# Patient Record
Sex: MALE | Race: WHITE | NOT HISPANIC OR LATINO | URBAN - METROPOLITAN AREA
[De-identification: names, ages, dates, MRNs, and addresses within clinical notes are randomized per-mention and may not be internally consistent; named-entity substitution may affect disease eponyms.]

---

## 2017-01-12 ENCOUNTER — EMERGENCY (EMERGENCY)
Facility: HOSPITAL | Age: 52
LOS: 0 days | Discharge: HOME | End: 2017-01-12
Admitting: EMERGENCY MEDICINE
Payer: COMMERCIAL

## 2017-01-12 DIAGNOSIS — B34.9 VIRAL INFECTION, UNSPECIFIED: ICD-10-CM

## 2017-01-12 DIAGNOSIS — R50.9 FEVER, UNSPECIFIED: ICD-10-CM

## 2017-01-12 DIAGNOSIS — Z11.2 ENCOUNTER FOR SCREENING FOR OTHER BACTERIAL DISEASES: ICD-10-CM

## 2017-01-12 PROCEDURE — 99284 EMERGENCY DEPT VISIT MOD MDM: CPT | Mod: 25

## 2017-01-12 PROCEDURE — 93010 ELECTROCARDIOGRAM REPORT: CPT

## 2017-04-15 ENCOUNTER — EMERGENCY (EMERGENCY)
Facility: HOSPITAL | Age: 52
LOS: 0 days | Discharge: HOME | End: 2017-04-16
Attending: EMERGENCY MEDICINE | Admitting: EMERGENCY MEDICINE
Payer: COMMERCIAL

## 2017-04-15 VITALS — HEIGHT: 68 IN | WEIGHT: 220.02 LBS

## 2017-04-15 DIAGNOSIS — B34.9 VIRAL INFECTION, UNSPECIFIED: ICD-10-CM

## 2017-04-15 DIAGNOSIS — R07.89 OTHER CHEST PAIN: ICD-10-CM

## 2017-04-15 DIAGNOSIS — R05 COUGH: ICD-10-CM

## 2017-04-15 LAB
ADD ON TEST-SPECIMEN IN LAB: SIGNIFICANT CHANGE UP
ALBUMIN SERPL ELPH-MCNC: 4 G/DL — SIGNIFICANT CHANGE UP (ref 3.3–5)
ALP SERPL-CCNC: 44 U/L — SIGNIFICANT CHANGE UP (ref 40–120)
ALT FLD-CCNC: 39 U/L — SIGNIFICANT CHANGE UP (ref 12–78)
ANION GAP SERPL CALC-SCNC: 10 MMOL/L — SIGNIFICANT CHANGE UP (ref 5–17)
AST SERPL-CCNC: 17 U/L — SIGNIFICANT CHANGE UP (ref 15–37)
BASOPHILS # BLD AUTO: 0.1 K/UL — SIGNIFICANT CHANGE UP (ref 0–0.2)
BASOPHILS NFR BLD AUTO: 0.9 % — SIGNIFICANT CHANGE UP (ref 0–2)
BILIRUB SERPL-MCNC: 0.8 MG/DL — SIGNIFICANT CHANGE UP (ref 0.2–1.2)
BUN SERPL-MCNC: 16 MG/DL — SIGNIFICANT CHANGE UP (ref 7–23)
CALCIUM SERPL-MCNC: 9.2 MG/DL — SIGNIFICANT CHANGE UP (ref 8.5–10.1)
CHLORIDE SERPL-SCNC: 106 MMOL/L — SIGNIFICANT CHANGE UP (ref 96–108)
CO2 SERPL-SCNC: 26 MMOL/L — SIGNIFICANT CHANGE UP (ref 22–31)
CREAT SERPL-MCNC: 1.07 MG/DL — SIGNIFICANT CHANGE UP (ref 0.5–1.3)
EOSINOPHIL # BLD AUTO: 0 K/UL — SIGNIFICANT CHANGE UP (ref 0–0.5)
EOSINOPHIL NFR BLD AUTO: 0.5 % — SIGNIFICANT CHANGE UP (ref 0–6)
GLUCOSE SERPL-MCNC: 255 MG/DL — HIGH (ref 70–99)
HCT VFR BLD CALC: 46.5 % — SIGNIFICANT CHANGE UP (ref 39–50)
HGB BLD-MCNC: 16.1 G/DL — SIGNIFICANT CHANGE UP (ref 13–17)
LACTATE SERPL-SCNC: 2.2 MMOL/L — HIGH (ref 0.7–2)
LYMPHOCYTES # BLD AUTO: 1.8 K/UL — SIGNIFICANT CHANGE UP (ref 1–3.3)
LYMPHOCYTES # BLD AUTO: 26.4 % — SIGNIFICANT CHANGE UP (ref 13–44)
MCHC RBC-ENTMCNC: 30.9 PG — SIGNIFICANT CHANGE UP (ref 27–34)
MCHC RBC-ENTMCNC: 34.7 GM/DL — SIGNIFICANT CHANGE UP (ref 32–36)
MCV RBC AUTO: 89.1 FL — SIGNIFICANT CHANGE UP (ref 80–100)
MONOCYTES # BLD AUTO: 0.5 K/UL — SIGNIFICANT CHANGE UP (ref 0–0.9)
MONOCYTES NFR BLD AUTO: 7.6 % — SIGNIFICANT CHANGE UP (ref 2–14)
NEUTROPHILS # BLD AUTO: 4.5 K/UL — SIGNIFICANT CHANGE UP (ref 1.8–7.4)
NEUTROPHILS NFR BLD AUTO: 64.6 % — SIGNIFICANT CHANGE UP (ref 43–77)
PLATELET # BLD AUTO: 244 K/UL — SIGNIFICANT CHANGE UP (ref 150–400)
POTASSIUM SERPL-MCNC: 3.6 MMOL/L — SIGNIFICANT CHANGE UP (ref 3.5–5.3)
POTASSIUM SERPL-SCNC: 3.6 MMOL/L — SIGNIFICANT CHANGE UP (ref 3.5–5.3)
PROT SERPL-MCNC: 7.4 GM/DL — SIGNIFICANT CHANGE UP (ref 6–8.3)
RBC # BLD: 5.22 M/UL — SIGNIFICANT CHANGE UP (ref 4.2–5.8)
RBC # FLD: 12 % — SIGNIFICANT CHANGE UP (ref 10.3–14.5)
SODIUM SERPL-SCNC: 142 MMOL/L — SIGNIFICANT CHANGE UP (ref 135–145)
WBC # BLD: 6.9 K/UL — SIGNIFICANT CHANGE UP (ref 3.8–10.5)
WBC # FLD AUTO: 6.9 K/UL — SIGNIFICANT CHANGE UP (ref 3.8–10.5)

## 2017-04-15 PROCEDURE — 93010 ELECTROCARDIOGRAM REPORT: CPT

## 2017-04-15 PROCEDURE — 99284 EMERGENCY DEPT VISIT MOD MDM: CPT

## 2017-04-15 PROCEDURE — 71020: CPT | Mod: 26

## 2017-04-15 RX ORDER — SODIUM CHLORIDE 9 MG/ML
1000 INJECTION INTRAMUSCULAR; INTRAVENOUS; SUBCUTANEOUS ONCE
Qty: 0 | Refills: 0 | Status: COMPLETED | OUTPATIENT
Start: 2017-04-15 | End: 2017-04-15

## 2017-04-15 RX ORDER — ALBUTEROL 90 UG/1
2 AEROSOL, METERED ORAL ONCE
Qty: 0 | Refills: 0 | Status: COMPLETED | OUTPATIENT
Start: 2017-04-15 | End: 2017-04-15

## 2017-04-15 RX ORDER — FLUTICASONE PROPIONATE 50 MCG
2 SPRAY, SUSPENSION NASAL
Qty: 1 | Refills: 0 | OUTPATIENT
Start: 2017-04-15 | End: 2017-05-15

## 2017-04-15 RX ORDER — IPRATROPIUM/ALBUTEROL SULFATE 18-103MCG
3 AEROSOL WITH ADAPTER (GRAM) INHALATION ONCE
Qty: 0 | Refills: 0 | Status: COMPLETED | OUTPATIENT
Start: 2017-04-15 | End: 2017-04-15

## 2017-04-15 RX ORDER — BENZOCAINE AND MENTHOL 5; 1 G/100ML; G/100ML
1 LIQUID ORAL ONCE
Qty: 0 | Refills: 0 | Status: COMPLETED | OUTPATIENT
Start: 2017-04-15 | End: 2017-04-15

## 2017-04-15 RX ORDER — KETOROLAC TROMETHAMINE 30 MG/ML
15 SYRINGE (ML) INJECTION ONCE
Qty: 0 | Refills: 0 | Status: DISCONTINUED | OUTPATIENT
Start: 2017-04-15 | End: 2017-04-15

## 2017-04-15 RX ADMIN — SODIUM CHLORIDE 1000 MILLILITER(S): 9 INJECTION INTRAMUSCULAR; INTRAVENOUS; SUBCUTANEOUS at 22:00

## 2017-04-15 RX ADMIN — BENZOCAINE AND MENTHOL 1 LOZENGE: 5; 1 LIQUID ORAL at 22:00

## 2017-04-15 RX ADMIN — SODIUM CHLORIDE 1000 MILLILITER(S): 9 INJECTION INTRAMUSCULAR; INTRAVENOUS; SUBCUTANEOUS at 23:23

## 2017-04-15 RX ADMIN — ALBUTEROL 2 PUFF(S): 90 AEROSOL, METERED ORAL at 23:23

## 2017-04-15 RX ADMIN — Medication 3 MILLILITER(S): at 23:23

## 2017-04-15 RX ADMIN — Medication 15 MILLIGRAM(S): at 22:00

## 2017-04-15 NOTE — ED STATDOCS - DETAILS:
I, Amanda Gill, performed the initial face to face bedside interview with this patient regarding history of present illness, review of symptoms and relevant past medical, social and family history.  I completed an independent physical examination.  I was the initial provider who evaluated this patient.  The history, relevant review of systems, past medical and surgical history, medical decision making, and physical examination was documented by the scribe in my presence and I attest to the accuracy of the documentation.

## 2017-04-15 NOTE — ED ADULT NURSE NOTE - OBJECTIVE STATEMENT
pt presents to ed for eval cough x 2 days . pt presents to ed awake and alert skin color pink warm and dry resp easy and unlabored no s/s of acute distress.

## 2017-04-15 NOTE — ED STATDOCS - PROGRESS NOTE DETAILS
Pt states he is feeling a better. I discussed results, including hyperglycemia - concern for new dx diabetes.  Pt noted frequent post nasal drip - sending fluticasone to his pharmacy, in addition to albuterol which he thinks helped open up his chest/ loosen sputum.  Completing 2nd liter of fluid, awaiting hb a1c, and then well for dischage. I alerted patient that a1c still not back. He will be discharged and will call for result tomorrow.

## 2017-04-15 NOTE — ED STATDOCS - OBJECTIVE STATEMENT
51 y/o M with NO Hx of asthma, or COPD presents to ED for evaluation of green mucous producing cough with associated chest tightness. Pt denies fever, SOB, n/v/d. Chest tightness is only associated with the cough. 53 y/o M with NO Hx of asthma, or COPD presents to ED for evaluation of green mucous producing cough with associated chest tightness. Pt denies fever, n/v/d. Chest tightness is only associated with the cough. Pt also reports general malaise and mild SOB. Sx present for 2 days. 51 y/o M with NO Hx of asthma, or COPD presents to ED for evaluation of green mucous producing cough with associated chest tightness. Pt denies abd pain, dysuria n/v/d. Chest tightness is only associated with the cough. Pt also reports general malaise and mild SOB, fever, but is afebrile in the ED. Sx present for 2 days. 53 y/o M with NO Hx of asthma, or COPD presents to ED for evaluation of green mucous producing cough with associated chest tightness, malaise, rhinitis, sore throat. Pt denies abd pain, dysuria n/v/d. Chest tightness is only associated with the cough. Pt also reports general malaise and mild SOB, fever, but is afebrile in the ED. Sx present for 2 days. No recent travel.

## 2017-04-15 NOTE — ED STATDOCS - MEDICAL DECISION MAKING DETAILS
53 y/o M presents to ED for evaluation of productive cough. 51 y/o M presents to ED for evaluation of productive cough, general malaise, mild SOB. URIvsPNA, will perform CXR, collect labs, reassess..

## 2017-04-16 VITALS
TEMPERATURE: 98 F | SYSTOLIC BLOOD PRESSURE: 135 MMHG | OXYGEN SATURATION: 99 % | HEART RATE: 85 BPM | RESPIRATION RATE: 18 BRPM | DIASTOLIC BLOOD PRESSURE: 94 MMHG

## 2017-04-16 LAB — HBA1C BLD-MCNC: 6.5 % — HIGH (ref 4–5.6)

## 2017-04-19 ENCOUNTER — APPOINTMENT (OUTPATIENT)
Dept: INTERNAL MEDICINE | Facility: CLINIC | Age: 52
End: 2017-04-19

## 2017-04-19 VITALS
HEIGHT: 69 IN | WEIGHT: 216 LBS | SYSTOLIC BLOOD PRESSURE: 142 MMHG | DIASTOLIC BLOOD PRESSURE: 90 MMHG | BODY MASS INDEX: 31.99 KG/M2 | TEMPERATURE: 98.1 F | RESPIRATION RATE: 18 BRPM | HEART RATE: 96 BPM | OXYGEN SATURATION: 98 %

## 2017-04-21 LAB
CULTURE RESULTS: SIGNIFICANT CHANGE UP
CULTURE RESULTS: SIGNIFICANT CHANGE UP
SPECIMEN SOURCE: SIGNIFICANT CHANGE UP
SPECIMEN SOURCE: SIGNIFICANT CHANGE UP

## 2017-08-30 ENCOUNTER — APPOINTMENT (OUTPATIENT)
Dept: INTERNAL MEDICINE | Facility: CLINIC | Age: 52
End: 2017-08-30

## 2018-01-18 ENCOUNTER — MEDICATION RENEWAL (OUTPATIENT)
Age: 53
End: 2018-01-18

## 2018-02-14 ENCOUNTER — APPOINTMENT (OUTPATIENT)
Dept: INTERNAL MEDICINE | Facility: CLINIC | Age: 53
End: 2018-02-14
Payer: COMMERCIAL

## 2018-02-14 VITALS
SYSTOLIC BLOOD PRESSURE: 112 MMHG | TEMPERATURE: 98.5 F | RESPIRATION RATE: 16 BRPM | BODY MASS INDEX: 26.96 KG/M2 | HEART RATE: 70 BPM | HEIGHT: 69 IN | WEIGHT: 181.99 LBS | OXYGEN SATURATION: 98 % | DIASTOLIC BLOOD PRESSURE: 80 MMHG

## 2018-02-14 DIAGNOSIS — Z78.9 OTHER SPECIFIED HEALTH STATUS: ICD-10-CM

## 2018-02-14 DIAGNOSIS — M54.16 RADICULOPATHY, LUMBAR REGION: ICD-10-CM

## 2018-02-14 DIAGNOSIS — M54.10 RADICULOPATHY, SITE UNSPECIFIED: ICD-10-CM

## 2018-02-14 PROCEDURE — 99214 OFFICE O/P EST MOD 30 MIN: CPT

## 2018-02-14 RX ORDER — AMOXICILLIN AND CLAVULANATE POTASSIUM 875; 125 MG/1; MG/1
875-125 TABLET, COATED ORAL
Qty: 14 | Refills: 0 | Status: COMPLETED | COMMUNITY
Start: 2017-09-20

## 2018-02-14 RX ORDER — OXYCODONE AND ACETAMINOPHEN 5; 325 MG/1; MG/1
5-325 TABLET ORAL
Qty: 40 | Refills: 0 | Status: DISCONTINUED | COMMUNITY
Start: 2018-01-18 | End: 2018-02-14

## 2018-02-14 RX ORDER — LABETALOL HYDROCHLORIDE 100 MG/1
100 TABLET, FILM COATED ORAL
Refills: 0 | Status: DISCONTINUED | COMMUNITY
End: 2018-02-14

## 2018-03-02 ENCOUNTER — INPATIENT (INPATIENT)
Facility: HOSPITAL | Age: 53
LOS: 11 days | Discharge: HOME | End: 2018-03-14
Attending: ORTHOPAEDIC SURGERY | Admitting: ORTHOPAEDIC SURGERY
Payer: COMMERCIAL

## 2018-03-02 VITALS — HEIGHT: 69 IN | WEIGHT: 186.07 LBS

## 2018-03-02 LAB
ALBUMIN SERPL ELPH-MCNC: 4.6 G/DL — SIGNIFICANT CHANGE UP (ref 3.3–5)
ALP SERPL-CCNC: 42 U/L — SIGNIFICANT CHANGE UP (ref 40–120)
ALT FLD-CCNC: 23 U/L — SIGNIFICANT CHANGE UP (ref 12–78)
ANION GAP SERPL CALC-SCNC: 8 MMOL/L — SIGNIFICANT CHANGE UP (ref 5–17)
AST SERPL-CCNC: 12 U/L — LOW (ref 15–37)
BASOPHILS # BLD AUTO: 0 K/UL — SIGNIFICANT CHANGE UP (ref 0–0.2)
BASOPHILS NFR BLD AUTO: 0.6 % — SIGNIFICANT CHANGE UP (ref 0–2)
BILIRUB SERPL-MCNC: 1.2 MG/DL — SIGNIFICANT CHANGE UP (ref 0.2–1.2)
BUN SERPL-MCNC: 32 MG/DL — HIGH (ref 7–23)
CALCIUM SERPL-MCNC: 9.4 MG/DL — SIGNIFICANT CHANGE UP (ref 8.5–10.1)
CHLORIDE SERPL-SCNC: 106 MMOL/L — SIGNIFICANT CHANGE UP (ref 96–108)
CO2 SERPL-SCNC: 24 MMOL/L — SIGNIFICANT CHANGE UP (ref 22–31)
CREAT SERPL-MCNC: 0.94 MG/DL — SIGNIFICANT CHANGE UP (ref 0.5–1.3)
EOSINOPHIL # BLD AUTO: 0 K/UL — SIGNIFICANT CHANGE UP (ref 0–0.5)
EOSINOPHIL NFR BLD AUTO: 0.1 % — SIGNIFICANT CHANGE UP (ref 0–6)
GLUCOSE SERPL-MCNC: 107 MG/DL — HIGH (ref 70–99)
HCT VFR BLD CALC: 45.3 % — SIGNIFICANT CHANGE UP (ref 39–50)
HGB BLD-MCNC: 15.8 G/DL — SIGNIFICANT CHANGE UP (ref 13–17)
INR BLD: 1.07 RATIO — SIGNIFICANT CHANGE UP (ref 0.88–1.16)
LYMPHOCYTES # BLD AUTO: 1.4 K/UL — SIGNIFICANT CHANGE UP (ref 1–3.3)
LYMPHOCYTES # BLD AUTO: 17.4 % — SIGNIFICANT CHANGE UP (ref 13–44)
MAGNESIUM SERPL-MCNC: 2.1 MG/DL — SIGNIFICANT CHANGE UP (ref 1.6–2.6)
MCHC RBC-ENTMCNC: 31.1 PG — SIGNIFICANT CHANGE UP (ref 27–34)
MCHC RBC-ENTMCNC: 34.8 GM/DL — SIGNIFICANT CHANGE UP (ref 32–36)
MCV RBC AUTO: 89.3 FL — SIGNIFICANT CHANGE UP (ref 80–100)
MONOCYTES # BLD AUTO: 0.5 K/UL — SIGNIFICANT CHANGE UP (ref 0–0.9)
MONOCYTES NFR BLD AUTO: 6.8 % — SIGNIFICANT CHANGE UP (ref 2–14)
NEUTROPHILS # BLD AUTO: 5.9 K/UL — SIGNIFICANT CHANGE UP (ref 1.8–7.4)
NEUTROPHILS NFR BLD AUTO: 75.2 % — SIGNIFICANT CHANGE UP (ref 43–77)
PHOSPHATE SERPL-MCNC: 2.1 MG/DL — LOW (ref 2.5–4.5)
PLATELET # BLD AUTO: 278 K/UL — SIGNIFICANT CHANGE UP (ref 150–400)
POTASSIUM SERPL-MCNC: 4.3 MMOL/L — SIGNIFICANT CHANGE UP (ref 3.5–5.3)
POTASSIUM SERPL-SCNC: 4.3 MMOL/L — SIGNIFICANT CHANGE UP (ref 3.5–5.3)
PROT SERPL-MCNC: 8.1 GM/DL — SIGNIFICANT CHANGE UP (ref 6–8.3)
PROTHROM AB SERPL-ACNC: 11.6 SEC — SIGNIFICANT CHANGE UP (ref 9.8–12.7)
RBC # BLD: 5.07 M/UL — SIGNIFICANT CHANGE UP (ref 4.2–5.8)
RBC # FLD: 11.9 % — SIGNIFICANT CHANGE UP (ref 10.3–14.5)
SODIUM SERPL-SCNC: 138 MMOL/L — SIGNIFICANT CHANGE UP (ref 135–145)
WBC # BLD: 7.8 K/UL — SIGNIFICANT CHANGE UP (ref 3.8–10.5)
WBC # FLD AUTO: 7.8 K/UL — SIGNIFICANT CHANGE UP (ref 3.8–10.5)

## 2018-03-02 PROCEDURE — 99285 EMERGENCY DEPT VISIT HI MDM: CPT

## 2018-03-02 RX ORDER — MORPHINE SULFATE 50 MG/1
4 CAPSULE, EXTENDED RELEASE ORAL EVERY 4 HOURS
Qty: 0 | Refills: 0 | Status: DISCONTINUED | OUTPATIENT
Start: 2018-03-02 | End: 2018-03-03

## 2018-03-02 RX ORDER — OXYCODONE HYDROCHLORIDE 5 MG/1
5 TABLET ORAL EVERY 4 HOURS
Qty: 0 | Refills: 0 | Status: DISCONTINUED | OUTPATIENT
Start: 2018-03-02 | End: 2018-03-03

## 2018-03-02 RX ORDER — FAMOTIDINE 10 MG/ML
20 INJECTION INTRAVENOUS EVERY 12 HOURS
Qty: 0 | Refills: 0 | Status: DISCONTINUED | OUTPATIENT
Start: 2018-03-02 | End: 2018-03-13

## 2018-03-02 RX ORDER — SODIUM CHLORIDE 9 MG/ML
1000 INJECTION INTRAMUSCULAR; INTRAVENOUS; SUBCUTANEOUS ONCE
Qty: 0 | Refills: 0 | Status: COMPLETED | OUTPATIENT
Start: 2018-03-02 | End: 2018-03-02

## 2018-03-02 RX ORDER — SODIUM CHLORIDE 9 MG/ML
1000 INJECTION INTRAMUSCULAR; INTRAVENOUS; SUBCUTANEOUS
Qty: 0 | Refills: 0 | Status: DISCONTINUED | OUTPATIENT
Start: 2018-03-02 | End: 2018-03-06

## 2018-03-02 RX ORDER — FLUTICASONE PROPIONATE 50 MCG
2 SPRAY, SUSPENSION NASAL DAILY
Qty: 0 | Refills: 0 | Status: DISCONTINUED | OUTPATIENT
Start: 2018-03-02 | End: 2018-03-13

## 2018-03-02 RX ORDER — DOCUSATE SODIUM 100 MG
100 CAPSULE ORAL THREE TIMES A DAY
Qty: 0 | Refills: 0 | Status: DISCONTINUED | OUTPATIENT
Start: 2018-03-02 | End: 2018-03-13

## 2018-03-02 RX ORDER — CYCLOBENZAPRINE HYDROCHLORIDE 10 MG/1
10 TABLET, FILM COATED ORAL EVERY 8 HOURS
Qty: 0 | Refills: 0 | Status: COMPLETED | OUTPATIENT
Start: 2018-03-02 | End: 2018-03-03

## 2018-03-02 RX ORDER — ACETAMINOPHEN 500 MG
650 TABLET ORAL EVERY 6 HOURS
Qty: 0 | Refills: 0 | Status: DISCONTINUED | OUTPATIENT
Start: 2018-03-02 | End: 2018-03-13

## 2018-03-02 RX ORDER — HYDROMORPHONE HYDROCHLORIDE 2 MG/ML
1 INJECTION INTRAMUSCULAR; INTRAVENOUS; SUBCUTANEOUS ONCE
Qty: 0 | Refills: 0 | Status: DISCONTINUED | OUTPATIENT
Start: 2018-03-02 | End: 2018-03-02

## 2018-03-02 RX ORDER — LIDOCAINE 4 G/100G
1 CREAM TOPICAL ONCE
Qty: 0 | Refills: 0 | Status: COMPLETED | OUTPATIENT
Start: 2018-03-02 | End: 2018-03-02

## 2018-03-02 RX ORDER — DIPHENHYDRAMINE HCL 50 MG
25 CAPSULE ORAL AT BEDTIME
Qty: 0 | Refills: 0 | Status: DISCONTINUED | OUTPATIENT
Start: 2018-03-02 | End: 2018-03-13

## 2018-03-02 RX ORDER — KETOROLAC TROMETHAMINE 30 MG/ML
30 SYRINGE (ML) INJECTION ONCE
Qty: 0 | Refills: 0 | Status: DISCONTINUED | OUTPATIENT
Start: 2018-03-02 | End: 2018-03-02

## 2018-03-02 RX ORDER — OXYCODONE HYDROCHLORIDE 5 MG/1
10 TABLET ORAL EVERY 4 HOURS
Qty: 0 | Refills: 0 | Status: DISCONTINUED | OUTPATIENT
Start: 2018-03-02 | End: 2018-03-03

## 2018-03-02 RX ORDER — ACETAMINOPHEN 500 MG
1000 TABLET ORAL ONCE
Qty: 0 | Refills: 0 | Status: COMPLETED | OUTPATIENT
Start: 2018-03-02 | End: 2018-03-02

## 2018-03-02 RX ADMIN — LIDOCAINE 1 PATCH: 4 CREAM TOPICAL at 17:21

## 2018-03-02 RX ADMIN — Medication 400 MILLIGRAM(S): at 17:20

## 2018-03-02 RX ADMIN — SODIUM CHLORIDE 2000 MILLILITER(S): 9 INJECTION INTRAMUSCULAR; INTRAVENOUS; SUBCUTANEOUS at 17:21

## 2018-03-02 RX ADMIN — Medication 30 MILLIGRAM(S): at 17:21

## 2018-03-02 RX ADMIN — HYDROMORPHONE HYDROCHLORIDE 1 MILLIGRAM(S): 2 INJECTION INTRAMUSCULAR; INTRAVENOUS; SUBCUTANEOUS at 18:00

## 2018-03-02 NOTE — ED STATDOCS - PROGRESS NOTE DETAILS
52 yr. old male PMH: presents to ED with pain in righty lower back radiating down rt. leg onset 3 weeks ago worsening unable to sit and uncomfortable lying down. Seen by PMD and sent to ED for evaluation with Dr. Duke. Has been on Steroids for 4 days with little relief. No incontinence of urine or stool. No caudal anesthesia. Seen and examined by attending in Intake. Plan: IV, IVF, Labs ,pain medication and MRI. Will F/U with results and re evaluate. Chen NP Unable to tolerate MRI and returned to [facility] NP

## 2018-03-02 NOTE — ED STATDOCS - OBJECTIVE STATEMENT
51 y/o male with no PMHx presents to the ED c/o low back pain radiating down right leg x3 weeks. +difficulty sitting, laying down secondary to discomfort. Called PCP who sent pt to the ED for eval by Dr. Duke. Took Motrin around 9am with no relief. Pt has been on 4 days of steroids. Denies fever, chills, incontinence, urinary sx. Nonsmoker. Denies IV drug use.

## 2018-03-02 NOTE — ED STATDOCS - ATTENDING CONTRIBUTION TO CARE
I, Jessica Grant MD, personally saw the patient with ACP.  I have personally performed a face to face diagnostic evaluation on this patient.  I have reviewed the ACP note and agree with the history, exam, and plan of care, except as noted.   52 year old male otherwise healthy with lower back pain without red flag symptoms complicated by radiculopathy to RLE. Physical exam with normal strength and sensation. Symptoms severe and interfering with quality fo life. Plan for spine consult and pain control. Upon reassessment, patient with continued severe pain, spine surgeon recommending MRI, patient with claustrophobia will require admission for sedated MRI, PT, and ongoing pain control. Ana Maria Grant MD

## 2018-03-02 NOTE — H&P ADULT - HISTORY OF PRESENT ILLNESS
52M with lower back pain and radiculopathy in the right lower extremity for the past 3 weeks. He is currently on is 4th day of medrol dose pack prescribed by outside provider and has been taking tramadol and tylenol without much pain releif. He states his pain radiates from his buttock to his ankle in his right leg. The pain is worse with sitting, relieved by standing. Pain is worse after ambulating about 100 ft, relieved when he stands still. He has had no trauma or remarkable injury to describe his current pain. He describes that he is having some muscle wekness in the right lower extremity. No other orthopedic complaints at this time. Deneis fevers, chills, nausea, vomitting, chest pain, shortness of breath.

## 2018-03-02 NOTE — ED STATDOCS - MEDICAL DECISION MAKING DETAILS
Lidocaine patch, IV Toradol, labs, MRI spine consult, IV Tylenol Lidocaine patch, IV Toradol, labs, MRI, spine consult, IV Tylenol

## 2018-03-02 NOTE — H&P ADULT - NSHPPHYSICALEXAM_GEN_ALL_CORE
Gen: NAD, AAOx3    Spine PE:  Skin intact  No gross deformity  No midnline TTP C/T/L/S spine  No bony step offs  No paraspinal muscle ttp/hypertonicity   Negative clonus  Negative babinski  Negative crain  No saddle anesthesia    Motor:                   C5                C6              C7               C8           T1   R            5/5                5/5            5/5             5/5          5/5  L             5/5               5/5             5/5             5/5          5/5                L2             L3             L4               L5            S1  R         5/5           5/5          4/5             4/5           4/5    weakness with foor dorsiflexion and plantar flexion when compared to left side  L          5/5          5/5           5/5             5/5           5/5    Sensory:            C5         C6         C7      C8       T1        (0=absent, 1=impaired, 2=normal, NT=not testable)  R         2            2           2        2         2  L          2            2           2        2         2               L2          L3         L4      L5       S1         (0=absent, 1=impaired, 2=normal, NT=not testable)  R         2            2            2        1        1               sensation altered compared to left side but sensation still intact  L          2            2           2        2         2

## 2018-03-03 DIAGNOSIS — M54.31 SCIATICA, RIGHT SIDE: ICD-10-CM

## 2018-03-03 LAB
ABO RH CONFIRMATION: SIGNIFICANT CHANGE UP
ANION GAP SERPL CALC-SCNC: 6 MMOL/L — SIGNIFICANT CHANGE UP (ref 5–17)
APPEARANCE UR: CLEAR — SIGNIFICANT CHANGE UP
APTT BLD: 26.3 SEC — LOW (ref 27.5–37.4)
BILIRUB UR-MCNC: NEGATIVE — SIGNIFICANT CHANGE UP
BLD GP AB SCN SERPL QL: SIGNIFICANT CHANGE UP
BUN SERPL-MCNC: 25 MG/DL — HIGH (ref 7–23)
CALCIUM SERPL-MCNC: 8.6 MG/DL — SIGNIFICANT CHANGE UP (ref 8.5–10.1)
CHLORIDE SERPL-SCNC: 108 MMOL/L — SIGNIFICANT CHANGE UP (ref 96–108)
CO2 SERPL-SCNC: 29 MMOL/L — SIGNIFICANT CHANGE UP (ref 22–31)
COLOR SPEC: YELLOW — SIGNIFICANT CHANGE UP
CREAT SERPL-MCNC: 0.74 MG/DL — SIGNIFICANT CHANGE UP (ref 0.5–1.3)
DIFF PNL FLD: NEGATIVE — SIGNIFICANT CHANGE UP
GLUCOSE SERPL-MCNC: 88 MG/DL — SIGNIFICANT CHANGE UP (ref 70–99)
GLUCOSE UR QL: NEGATIVE MG/DL — SIGNIFICANT CHANGE UP
HCT VFR BLD CALC: 39.4 % — SIGNIFICANT CHANGE UP (ref 39–50)
HGB BLD-MCNC: 13.9 G/DL — SIGNIFICANT CHANGE UP (ref 13–17)
INR BLD: 1.09 RATIO — SIGNIFICANT CHANGE UP (ref 0.88–1.16)
KETONES UR-MCNC: NEGATIVE — SIGNIFICANT CHANGE UP
LEUKOCYTE ESTERASE UR-ACNC: NEGATIVE — SIGNIFICANT CHANGE UP
MCHC RBC-ENTMCNC: 31 PG — SIGNIFICANT CHANGE UP (ref 27–34)
MCHC RBC-ENTMCNC: 35.3 GM/DL — SIGNIFICANT CHANGE UP (ref 32–36)
MCV RBC AUTO: 87.7 FL — SIGNIFICANT CHANGE UP (ref 80–100)
NITRITE UR-MCNC: NEGATIVE — SIGNIFICANT CHANGE UP
PH UR: 7 — SIGNIFICANT CHANGE UP (ref 5–8)
PLATELET # BLD AUTO: 207 K/UL — SIGNIFICANT CHANGE UP (ref 150–400)
POTASSIUM SERPL-MCNC: 3.8 MMOL/L — SIGNIFICANT CHANGE UP (ref 3.5–5.3)
POTASSIUM SERPL-SCNC: 3.8 MMOL/L — SIGNIFICANT CHANGE UP (ref 3.5–5.3)
PROT UR-MCNC: NEGATIVE MG/DL — SIGNIFICANT CHANGE UP
PROTHROM AB SERPL-ACNC: 11.8 SEC — SIGNIFICANT CHANGE UP (ref 9.8–12.7)
RBC # BLD: 4.5 M/UL — SIGNIFICANT CHANGE UP (ref 4.2–5.8)
RBC # FLD: 11.7 % — SIGNIFICANT CHANGE UP (ref 10.3–14.5)
SODIUM SERPL-SCNC: 143 MMOL/L — SIGNIFICANT CHANGE UP (ref 135–145)
SP GR SPEC: 1.01 — SIGNIFICANT CHANGE UP (ref 1.01–1.02)
TYPE + AB SCN PNL BLD: SIGNIFICANT CHANGE UP
UROBILINOGEN FLD QL: NEGATIVE MG/DL — SIGNIFICANT CHANGE UP
WBC # BLD: 7.3 K/UL — SIGNIFICANT CHANGE UP (ref 3.8–10.5)
WBC # FLD AUTO: 7.3 K/UL — SIGNIFICANT CHANGE UP (ref 3.8–10.5)

## 2018-03-03 PROCEDURE — 71045 X-RAY EXAM CHEST 1 VIEW: CPT | Mod: 26

## 2018-03-03 PROCEDURE — 93010 ELECTROCARDIOGRAM REPORT: CPT

## 2018-03-03 RX ORDER — NALOXONE HYDROCHLORIDE 4 MG/.1ML
0.1 SPRAY NASAL
Qty: 0 | Refills: 0 | Status: DISCONTINUED | OUTPATIENT
Start: 2018-03-03 | End: 2018-03-13

## 2018-03-03 RX ORDER — DIAZEPAM 5 MG
10 TABLET ORAL EVERY 8 HOURS
Qty: 0 | Refills: 0 | Status: DISCONTINUED | OUTPATIENT
Start: 2018-03-03 | End: 2018-03-04

## 2018-03-03 RX ORDER — HYDROMORPHONE HYDROCHLORIDE 2 MG/ML
30 INJECTION INTRAMUSCULAR; INTRAVENOUS; SUBCUTANEOUS
Qty: 0 | Refills: 0 | Status: DISCONTINUED | OUTPATIENT
Start: 2018-03-03 | End: 2018-03-06

## 2018-03-03 RX ORDER — ACETAMINOPHEN 500 MG
325 TABLET ORAL ONCE
Qty: 0 | Refills: 0 | Status: COMPLETED | OUTPATIENT
Start: 2018-03-03 | End: 2018-03-03

## 2018-03-03 RX ORDER — HYDROMORPHONE HYDROCHLORIDE 2 MG/ML
1 INJECTION INTRAMUSCULAR; INTRAVENOUS; SUBCUTANEOUS ONCE
Qty: 0 | Refills: 0 | Status: DISCONTINUED | OUTPATIENT
Start: 2018-03-03 | End: 2018-03-03

## 2018-03-03 RX ORDER — ONDANSETRON 8 MG/1
4 TABLET, FILM COATED ORAL EVERY 6 HOURS
Qty: 0 | Refills: 0 | Status: DISCONTINUED | OUTPATIENT
Start: 2018-03-03 | End: 2018-03-13

## 2018-03-03 RX ORDER — HYDROMORPHONE HYDROCHLORIDE 2 MG/ML
0.5 INJECTION INTRAMUSCULAR; INTRAVENOUS; SUBCUTANEOUS
Qty: 0 | Refills: 0 | Status: DISCONTINUED | OUTPATIENT
Start: 2018-03-03 | End: 2018-03-06

## 2018-03-03 RX ADMIN — FAMOTIDINE 20 MILLIGRAM(S): 10 INJECTION INTRAVENOUS at 17:49

## 2018-03-03 RX ADMIN — SODIUM CHLORIDE 100 MILLILITER(S): 9 INJECTION INTRAMUSCULAR; INTRAVENOUS; SUBCUTANEOUS at 05:12

## 2018-03-03 RX ADMIN — HYDROMORPHONE HYDROCHLORIDE 1 MILLIGRAM(S): 2 INJECTION INTRAMUSCULAR; INTRAVENOUS; SUBCUTANEOUS at 11:44

## 2018-03-03 RX ADMIN — MORPHINE SULFATE 4 MILLIGRAM(S): 50 CAPSULE, EXTENDED RELEASE ORAL at 05:10

## 2018-03-03 RX ADMIN — Medication 650 MILLIGRAM(S): at 19:53

## 2018-03-03 RX ADMIN — MORPHINE SULFATE 4 MILLIGRAM(S): 50 CAPSULE, EXTENDED RELEASE ORAL at 00:29

## 2018-03-03 RX ADMIN — MORPHINE SULFATE 4 MILLIGRAM(S): 50 CAPSULE, EXTENDED RELEASE ORAL at 05:30

## 2018-03-03 RX ADMIN — CYCLOBENZAPRINE HYDROCHLORIDE 10 MILLIGRAM(S): 10 TABLET, FILM COATED ORAL at 00:07

## 2018-03-03 RX ADMIN — Medication 325 MILLIGRAM(S): at 21:31

## 2018-03-03 RX ADMIN — Medication 10 MILLIGRAM(S): at 13:44

## 2018-03-03 RX ADMIN — MORPHINE SULFATE 4 MILLIGRAM(S): 50 CAPSULE, EXTENDED RELEASE ORAL at 00:07

## 2018-03-03 RX ADMIN — Medication 10 MILLIGRAM(S): at 21:30

## 2018-03-03 RX ADMIN — LIDOCAINE 1 PATCH: 4 CREAM TOPICAL at 05:19

## 2018-03-03 RX ADMIN — ONDANSETRON 4 MILLIGRAM(S): 8 TABLET, FILM COATED ORAL at 17:50

## 2018-03-03 RX ADMIN — HYDROMORPHONE HYDROCHLORIDE 30 MILLILITER(S): 2 INJECTION INTRAMUSCULAR; INTRAVENOUS; SUBCUTANEOUS at 13:26

## 2018-03-04 PROCEDURE — 72148 MRI LUMBAR SPINE W/O DYE: CPT | Mod: 26

## 2018-03-04 PROCEDURE — 72131 CT LUMBAR SPINE W/O DYE: CPT | Mod: 26

## 2018-03-04 PROCEDURE — 93010 ELECTROCARDIOGRAM REPORT: CPT

## 2018-03-04 RX ORDER — DEXAMETHASONE 0.5 MG/5ML
6 ELIXIR ORAL EVERY 8 HOURS
Qty: 0 | Refills: 0 | Status: DISCONTINUED | OUTPATIENT
Start: 2018-03-05 | End: 2018-03-05

## 2018-03-04 RX ORDER — ONDANSETRON 8 MG/1
4 TABLET, FILM COATED ORAL ONCE
Qty: 0 | Refills: 0 | Status: DISCONTINUED | OUTPATIENT
Start: 2018-03-07 | End: 2018-03-13

## 2018-03-04 RX ORDER — DEXAMETHASONE 0.5 MG/5ML
ELIXIR ORAL
Qty: 0 | Refills: 0 | Status: DISCONTINUED | OUTPATIENT
Start: 2018-03-04 | End: 2018-03-04

## 2018-03-04 RX ORDER — DEXAMETHASONE 0.5 MG/5ML
10 ELIXIR ORAL ONCE
Qty: 0 | Refills: 0 | Status: COMPLETED | OUTPATIENT
Start: 2018-03-04 | End: 2018-03-04

## 2018-03-04 RX ORDER — DEXAMETHASONE 0.5 MG/5ML
ELIXIR ORAL
Qty: 0 | Refills: 0 | Status: DISCONTINUED | OUTPATIENT
Start: 2018-03-04 | End: 2018-03-05

## 2018-03-04 RX ORDER — DIAZEPAM 5 MG
10 TABLET ORAL ONCE
Qty: 0 | Refills: 0 | Status: DISCONTINUED | OUTPATIENT
Start: 2018-03-04 | End: 2018-03-04

## 2018-03-04 RX ORDER — DEXAMETHASONE 0.5 MG/5ML
8 ELIXIR ORAL EVERY 8 HOURS
Qty: 0 | Refills: 0 | Status: DISCONTINUED | OUTPATIENT
Start: 2018-03-05 | End: 2018-03-05

## 2018-03-04 RX ADMIN — ONDANSETRON 4 MILLIGRAM(S): 8 TABLET, FILM COATED ORAL at 03:10

## 2018-03-04 RX ADMIN — Medication 102 MILLIGRAM(S): at 17:05

## 2018-03-04 RX ADMIN — Medication 10 MILLIGRAM(S): at 11:43

## 2018-03-04 RX ADMIN — SODIUM CHLORIDE 100 MILLILITER(S): 9 INJECTION INTRAMUSCULAR; INTRAVENOUS; SUBCUTANEOUS at 22:07

## 2018-03-04 RX ADMIN — SODIUM CHLORIDE 100 MILLILITER(S): 9 INJECTION INTRAMUSCULAR; INTRAVENOUS; SUBCUTANEOUS at 11:15

## 2018-03-04 RX ADMIN — ONDANSETRON 4 MILLIGRAM(S): 8 TABLET, FILM COATED ORAL at 08:38

## 2018-03-04 NOTE — PROGRESS NOTE ADULT - PROBLEM SELECTOR PLAN 1
MRI + HNP rt L45 and DDD L5S1 w hnp rt and left L5S1      will start decadron taper  zantac  pain meds  PT  Ct scan LS spine

## 2018-03-05 DIAGNOSIS — M51.26 OTHER INTERVERTEBRAL DISC DISPLACEMENT, LUMBAR REGION: ICD-10-CM

## 2018-03-05 RX ADMIN — FAMOTIDINE 20 MILLIGRAM(S): 10 INJECTION INTRAVENOUS at 05:38

## 2018-03-05 RX ADMIN — Medication 101.6 MILLIGRAM(S): at 05:37

## 2018-03-05 RX ADMIN — FAMOTIDINE 20 MILLIGRAM(S): 10 INJECTION INTRAVENOUS at 17:30

## 2018-03-05 RX ADMIN — Medication 2 SPRAY(S): at 11:37

## 2018-03-05 NOTE — CONSULT NOTE ADULT - SUBJECTIVE AND OBJECTIVE BOX
Patient is a 52y old  Male who presents with a chief complaint of " I can't walk".      HPI: Pt is a 53 y/o male with no significant past medical history who few weeks ago developed LBP with radiation down his RLE.  He was started on steroids, tramadol and antiinflammatory agents w/o improvement.  In fact his symptoms progressed with intractable LBP with inability to ambulate therefore he came to  where he was admitted by Dr Duke.  Medicine consult called for comanagement.  Pt still c/o severe LBP with radiation down his RLE, denies any weakness.  No improvement with steroids and Dilaudid PCA.  Pt seen earlier this morning in his room.  Denies any incontinence.        PAST MEDICAL & SURGICAL HISTORY:  No pertinent past medical history  No significant past surgical history      Allergies    No Known Allergies    Intolerances        MEDICATIONS  (STANDING):  docusate sodium 100 milliGRAM(s) Oral three times a day  famotidine    Tablet 20 milliGRAM(s) Oral every 12 hours  fluticasone propionate 50 MICROgram(s)/spray Nasal Spray 2 Spray(s) Both Nostrils daily  HYDROmorphone PCA (1 mG/mL) 30 milliLiter(s) PCA Continuous PCA Continuous  sodium chloride 0.9%. 1000 milliLiter(s) (30 mL/Hr) IV Continuous <Continuous>    MEDICATIONS  (PRN):  acetaminophen   Tablet 650 milliGRAM(s) Oral every 6 hours PRN For Temp greater than 38 C (100.4 F)  acetaminophen   Tablet. 650 milliGRAM(s) Oral every 6 hours PRN headache  bisacodyl Suppository 10 milliGRAM(s) Rectal daily PRN If no bowel movement by POD#2  diphenhydrAMINE   Capsule 25 milliGRAM(s) Oral at bedtime PRN Insomnia  HYDROmorphone PCA (1 mG/mL) Rescue Clinician Bolus 0.5 milliGRAM(s) IV Push every 15 minutes PRN for Pain Scale GREATER THAN 6  naloxone Injectable 0.1 milliGRAM(s) IV Push every 3 minutes PRN For ANY of the following changes in patient status:  A. RR LESS THAN 10 breaths per minute, B. Oxygen saturation LESS THAN 90%, C. Sedation score of 6  ondansetron Injectable 4 milliGRAM(s) IV Push every 6 hours PRN Nausea      FAMILY HISTORY: Dad with brain aneurysm       Social History: denies smoking, ETOH use, work at  [employer]      Vital Signs Last 24 Hrs  T(C): 36.8 (05 Mar 2018 16:35), Max: 37.3 (04 Mar 2018 21:40)  T(F): 98.2 (05 Mar 2018 16:35), Max: 99.1 (04 Mar 2018 21:40)  HR: 87 (05 Mar 2018 16:35) (87 - 102)  BP: 129/83 (05 Mar 2018 16:35) (129/83 - 137/96)  BP(mean): --  RR: 18 (05 Mar 2018 16:35) (16 - 18)  SpO2: 96% (05 Mar 2018 16:35) (93% - 97%)    Daily     Daily     I&O's Summary    04 Mar 2018 07:01  -  05 Mar 2018 07:00  --------------------------------------------------------  IN: 900 mL / OUT: 0 mL / NET: 900 mL          Data        MRI-< from: MR Lumbar Spine No Cont (03.04.18 @ 10:09) >  Multilevel degenerative disc disease and spondylosis at L1-2   through L5-S1 with bulges at L1-2 through L5-S1 that narrow the BILATERAL   L3-4 through L5-S1 neural foramina. Small LEFT foraminal disc herniation   is noted at T12-L1 which narrows the LEFT neural foramen and abuts the   exiting LEFT T12 nerve root. Small LEFT lateral disc herniation is noted   at L1-2. Tiny central disc herniations noted at L3-4 and L4-5 with mild   central stenosis on a degenerative basis. Small central disc herniation   at L5-S1 compresses the ventral thecal sac and the exiting RIGHT S1 nerve   root..    < end of copied text >

## 2018-03-05 NOTE — CONSULT NOTE ADULT - ASSESSMENT
Pt is a 51 y/o male with no significant past medical history who few weeks ago developed LBP with radiation down his RLE.  He was started on steroids, tramadol and antiinflammatory agents w/o improvement.  In fact his symptoms progressed with intractable LBP with inability to ambulate therefore he came to  where he was admitted by Dr Duke.  Medicine consult called for comanagement.       * LBP-due to L5-S1 Disc Herniation-failed steroids continue Dilaudid PCA, agree with STONE if fails than may need surgery.  Continue pain control with dilaudid pca.  * Disp-OOB, ambulate  * Proph- heparin if okay with jackelyn Olivo for now  * Comm- d/w pt pt, Dr Duke

## 2018-03-06 RX ORDER — OXYCODONE HYDROCHLORIDE 5 MG/1
10 TABLET ORAL EVERY 6 HOURS
Qty: 0 | Refills: 0 | Status: DISCONTINUED | OUTPATIENT
Start: 2018-03-06 | End: 2018-03-06

## 2018-03-06 RX ORDER — HYDROMORPHONE HYDROCHLORIDE 2 MG/ML
2 INJECTION INTRAMUSCULAR; INTRAVENOUS; SUBCUTANEOUS
Qty: 0 | Refills: 0 | Status: DISCONTINUED | OUTPATIENT
Start: 2018-03-06 | End: 2018-03-13

## 2018-03-06 RX ORDER — SODIUM CHLORIDE 9 MG/ML
1000 INJECTION, SOLUTION INTRAVENOUS
Qty: 0 | Refills: 0 | Status: DISCONTINUED | OUTPATIENT
Start: 2018-03-06 | End: 2018-03-06

## 2018-03-06 RX ORDER — HYDROMORPHONE HYDROCHLORIDE 2 MG/ML
0.5 INJECTION INTRAMUSCULAR; INTRAVENOUS; SUBCUTANEOUS
Qty: 0 | Refills: 0 | Status: DISCONTINUED | OUTPATIENT
Start: 2018-03-06 | End: 2018-03-13

## 2018-03-06 RX ORDER — ONDANSETRON 8 MG/1
4 TABLET, FILM COATED ORAL ONCE
Qty: 0 | Refills: 0 | Status: DISCONTINUED | OUTPATIENT
Start: 2018-03-06 | End: 2018-03-06

## 2018-03-06 RX ORDER — HYDROMORPHONE HYDROCHLORIDE 2 MG/ML
0.5 INJECTION INTRAMUSCULAR; INTRAVENOUS; SUBCUTANEOUS
Qty: 0 | Refills: 0 | Status: DISCONTINUED | OUTPATIENT
Start: 2018-03-06 | End: 2018-03-06

## 2018-03-06 RX ORDER — ACETAMINOPHEN 500 MG
1000 TABLET ORAL ONCE
Qty: 0 | Refills: 0 | Status: COMPLETED | OUTPATIENT
Start: 2018-03-06 | End: 2018-03-06

## 2018-03-06 RX ADMIN — Medication 2 SPRAY(S): at 12:49

## 2018-03-06 RX ADMIN — Medication 100 MILLIGRAM(S): at 20:54

## 2018-03-06 RX ADMIN — FAMOTIDINE 20 MILLIGRAM(S): 10 INJECTION INTRAVENOUS at 19:21

## 2018-03-06 RX ADMIN — FAMOTIDINE 20 MILLIGRAM(S): 10 INJECTION INTRAVENOUS at 05:12

## 2018-03-06 RX ADMIN — HYDROMORPHONE HYDROCHLORIDE 0.5 MILLIGRAM(S): 2 INJECTION INTRAMUSCULAR; INTRAVENOUS; SUBCUTANEOUS at 12:04

## 2018-03-06 RX ADMIN — HYDROMORPHONE HYDROCHLORIDE 30 MILLILITER(S): 2 INJECTION INTRAMUSCULAR; INTRAVENOUS; SUBCUTANEOUS at 09:32

## 2018-03-06 RX ADMIN — SODIUM CHLORIDE 75 MILLILITER(S): 9 INJECTION, SOLUTION INTRAVENOUS at 12:13

## 2018-03-06 RX ADMIN — HYDROMORPHONE HYDROCHLORIDE 0.5 MILLIGRAM(S): 2 INJECTION INTRAMUSCULAR; INTRAVENOUS; SUBCUTANEOUS at 11:27

## 2018-03-06 RX ADMIN — HYDROMORPHONE HYDROCHLORIDE 0.5 MILLIGRAM(S): 2 INJECTION INTRAMUSCULAR; INTRAVENOUS; SUBCUTANEOUS at 11:43

## 2018-03-06 RX ADMIN — HYDROMORPHONE HYDROCHLORIDE 0.5 MILLIGRAM(S): 2 INJECTION INTRAMUSCULAR; INTRAVENOUS; SUBCUTANEOUS at 11:53

## 2018-03-06 RX ADMIN — OXYCODONE HYDROCHLORIDE 10 MILLIGRAM(S): 5 TABLET ORAL at 15:17

## 2018-03-06 RX ADMIN — Medication 400 MILLIGRAM(S): at 12:10

## 2018-03-06 RX ADMIN — HYDROMORPHONE HYDROCHLORIDE 2 MILLIGRAM(S): 2 INJECTION INTRAMUSCULAR; INTRAVENOUS; SUBCUTANEOUS at 20:54

## 2018-03-06 NOTE — PHYSICAL THERAPY INITIAL EVALUATION ADULT - IMPAIRMENTS CONTRIBUTING TO GAIT DEVIATIONS, PT EVAL
pain/decreased strength increase in c/o pain with increasing ambulation distance/pain/decreased strength

## 2018-03-06 NOTE — BRIEF OPERATIVE NOTE - PROCEDURE
<<-----Click on this checkbox to enter Procedure Epidural steroid injection  03/06/2018  transforaminal approach, right L4/5, L5/S1  Active  BROOK

## 2018-03-06 NOTE — PROGRESS NOTE ADULT - PROBLEM SELECTOR PLAN 1
for HUYEN today  discussed w pt  all questions answered  if this is unsuccessful, pt may need surgery

## 2018-03-06 NOTE — PHYSICAL THERAPY INITIAL EVALUATION ADULT - PERTINENT HX OF CURRENT PROBLEM, REHAB EVAL
52M with lower back pain and radiculopathy in the right lower extremity for the past 3 weeks. He states his pain radiates from his buttock to his ankle in his right leg. The pain is worse with sitting, relieved by standing. Pain is worse after ambulating about 100 ft, relieved when he stands still. MRI + HNP rt L45 and DDD L5S1 w hnp rt and left L5S1 52M with lower back pain and radiculopathy in the right lower extremity for the past 3 weeks. He states his pain radiates from his buttock to his ankle in his right leg. The pain is worse with sitting, relieved by standing. Pain is worse after ambulating about 100 ft, relieved when he stands still. MRI + HNP rt L45 and DDD L5S1 w hnp rt and left L5S1. s/p LESI on 3/6/18.

## 2018-03-06 NOTE — PHYSICAL THERAPY INITIAL EVALUATION ADULT - DIAGNOSIS, PT EVAL
low back pain with radiculopathy into the RLE, Sciatica of right side low back pain with radiculopathy into the RLE, Sciatica of right side, HNP Rt L45, Rt L5S1 low back pain with radiculopathy into the RLE, Sciatica of right side, HNP Rt L45, Rt L5S1, s/p LESI on 3/6/18.

## 2018-03-07 RX ORDER — MULTIVIT WITH MIN/MFOLATE/K2 340-15/3 G
300 POWDER (GRAM) ORAL DAILY
Qty: 0 | Refills: 0 | Status: DISCONTINUED | OUTPATIENT
Start: 2018-03-07 | End: 2018-03-13

## 2018-03-07 RX ORDER — POLYETHYLENE GLYCOL 3350 17 G/17G
17 POWDER, FOR SOLUTION ORAL DAILY
Qty: 0 | Refills: 0 | Status: DISCONTINUED | OUTPATIENT
Start: 2018-03-07 | End: 2018-03-13

## 2018-03-07 RX ORDER — HEPARIN SODIUM 5000 [USP'U]/ML
5000 INJECTION INTRAVENOUS; SUBCUTANEOUS EVERY 12 HOURS
Qty: 0 | Refills: 0 | Status: COMPLETED | OUTPATIENT
Start: 2018-03-07 | End: 2018-03-12

## 2018-03-07 RX ADMIN — Medication 100 MILLIGRAM(S): at 03:51

## 2018-03-07 RX ADMIN — HEPARIN SODIUM 5000 UNIT(S): 5000 INJECTION INTRAVENOUS; SUBCUTANEOUS at 16:32

## 2018-03-07 RX ADMIN — FAMOTIDINE 20 MILLIGRAM(S): 10 INJECTION INTRAVENOUS at 03:51

## 2018-03-07 RX ADMIN — Medication 100 MILLIGRAM(S): at 11:27

## 2018-03-07 RX ADMIN — POLYETHYLENE GLYCOL 3350 17 GRAM(S): 17 POWDER, FOR SOLUTION ORAL at 22:32

## 2018-03-07 RX ADMIN — Medication 100 MILLIGRAM(S): at 21:58

## 2018-03-07 RX ADMIN — HYDROMORPHONE HYDROCHLORIDE 2 MILLIGRAM(S): 2 INJECTION INTRAMUSCULAR; INTRAVENOUS; SUBCUTANEOUS at 09:38

## 2018-03-07 RX ADMIN — HYDROMORPHONE HYDROCHLORIDE 2 MILLIGRAM(S): 2 INJECTION INTRAMUSCULAR; INTRAVENOUS; SUBCUTANEOUS at 19:36

## 2018-03-07 RX ADMIN — HYDROMORPHONE HYDROCHLORIDE 2 MILLIGRAM(S): 2 INJECTION INTRAMUSCULAR; INTRAVENOUS; SUBCUTANEOUS at 03:46

## 2018-03-07 RX ADMIN — HYDROMORPHONE HYDROCHLORIDE 2 MILLIGRAM(S): 2 INJECTION INTRAMUSCULAR; INTRAVENOUS; SUBCUTANEOUS at 19:31

## 2018-03-07 RX ADMIN — Medication 2 SPRAY(S): at 11:27

## 2018-03-08 LAB
ANION GAP SERPL CALC-SCNC: 7 MMOL/L — SIGNIFICANT CHANGE UP (ref 5–17)
BUN SERPL-MCNC: 25 MG/DL — HIGH (ref 7–23)
CALCIUM SERPL-MCNC: 9.4 MG/DL — SIGNIFICANT CHANGE UP (ref 8.5–10.1)
CHLORIDE SERPL-SCNC: 103 MMOL/L — SIGNIFICANT CHANGE UP (ref 96–108)
CO2 SERPL-SCNC: 28 MMOL/L — SIGNIFICANT CHANGE UP (ref 22–31)
CREAT SERPL-MCNC: 0.91 MG/DL — SIGNIFICANT CHANGE UP (ref 0.5–1.3)
GLUCOSE SERPL-MCNC: 104 MG/DL — HIGH (ref 70–99)
POTASSIUM SERPL-MCNC: 4.2 MMOL/L — SIGNIFICANT CHANGE UP (ref 3.5–5.3)
POTASSIUM SERPL-SCNC: 4.2 MMOL/L — SIGNIFICANT CHANGE UP (ref 3.5–5.3)
SODIUM SERPL-SCNC: 138 MMOL/L — SIGNIFICANT CHANGE UP (ref 135–145)

## 2018-03-08 RX ORDER — HYDROMORPHONE HYDROCHLORIDE 2 MG/ML
2 INJECTION INTRAMUSCULAR; INTRAVENOUS; SUBCUTANEOUS
Qty: 0 | Refills: 0 | Status: DISCONTINUED | OUTPATIENT
Start: 2018-03-08 | End: 2018-03-13

## 2018-03-08 RX ADMIN — HYDROMORPHONE HYDROCHLORIDE 2 MILLIGRAM(S): 2 INJECTION INTRAMUSCULAR; INTRAVENOUS; SUBCUTANEOUS at 19:52

## 2018-03-08 RX ADMIN — HYDROMORPHONE HYDROCHLORIDE 2 MILLIGRAM(S): 2 INJECTION INTRAMUSCULAR; INTRAVENOUS; SUBCUTANEOUS at 03:38

## 2018-03-08 RX ADMIN — HYDROMORPHONE HYDROCHLORIDE 2 MILLIGRAM(S): 2 INJECTION INTRAMUSCULAR; INTRAVENOUS; SUBCUTANEOUS at 13:31

## 2018-03-08 RX ADMIN — HEPARIN SODIUM 5000 UNIT(S): 5000 INJECTION INTRAVENOUS; SUBCUTANEOUS at 05:17

## 2018-03-08 RX ADMIN — Medication 100 MILLIGRAM(S): at 05:17

## 2018-03-08 RX ADMIN — POLYETHYLENE GLYCOL 3350 17 GRAM(S): 17 POWDER, FOR SOLUTION ORAL at 11:51

## 2018-03-08 RX ADMIN — HYDROMORPHONE HYDROCHLORIDE 2 MILLIGRAM(S): 2 INJECTION INTRAMUSCULAR; INTRAVENOUS; SUBCUTANEOUS at 03:29

## 2018-03-08 RX ADMIN — Medication 2 SPRAY(S): at 11:40

## 2018-03-08 RX ADMIN — FAMOTIDINE 20 MILLIGRAM(S): 10 INJECTION INTRAVENOUS at 05:17

## 2018-03-08 RX ADMIN — Medication 300 MILLILITER(S): at 19:48

## 2018-03-08 RX ADMIN — HYDROMORPHONE HYDROCHLORIDE 2 MILLIGRAM(S): 2 INJECTION INTRAMUSCULAR; INTRAVENOUS; SUBCUTANEOUS at 14:00

## 2018-03-08 RX ADMIN — Medication 100 MILLIGRAM(S): at 19:52

## 2018-03-08 RX ADMIN — HEPARIN SODIUM 5000 UNIT(S): 5000 INJECTION INTRAVENOUS; SUBCUTANEOUS at 18:08

## 2018-03-09 RX ADMIN — HYDROMORPHONE HYDROCHLORIDE 2 MILLIGRAM(S): 2 INJECTION INTRAMUSCULAR; INTRAVENOUS; SUBCUTANEOUS at 14:02

## 2018-03-09 RX ADMIN — HEPARIN SODIUM 5000 UNIT(S): 5000 INJECTION INTRAVENOUS; SUBCUTANEOUS at 05:22

## 2018-03-09 RX ADMIN — Medication 100 MILLIGRAM(S): at 12:06

## 2018-03-09 RX ADMIN — Medication 100 MILLIGRAM(S): at 05:22

## 2018-03-09 RX ADMIN — POLYETHYLENE GLYCOL 3350 17 GRAM(S): 17 POWDER, FOR SOLUTION ORAL at 12:06

## 2018-03-09 RX ADMIN — Medication 10 MILLIGRAM(S): at 09:12

## 2018-03-09 RX ADMIN — HYDROMORPHONE HYDROCHLORIDE 2 MILLIGRAM(S): 2 INJECTION INTRAMUSCULAR; INTRAVENOUS; SUBCUTANEOUS at 12:21

## 2018-03-09 RX ADMIN — FAMOTIDINE 20 MILLIGRAM(S): 10 INJECTION INTRAVENOUS at 16:19

## 2018-03-09 RX ADMIN — Medication 2 SPRAY(S): at 12:06

## 2018-03-09 RX ADMIN — HYDROMORPHONE HYDROCHLORIDE 2 MILLIGRAM(S): 2 INJECTION INTRAMUSCULAR; INTRAVENOUS; SUBCUTANEOUS at 21:03

## 2018-03-09 RX ADMIN — HEPARIN SODIUM 5000 UNIT(S): 5000 INJECTION INTRAVENOUS; SUBCUTANEOUS at 16:19

## 2018-03-09 RX ADMIN — FAMOTIDINE 20 MILLIGRAM(S): 10 INJECTION INTRAVENOUS at 05:22

## 2018-03-09 RX ADMIN — HYDROMORPHONE HYDROCHLORIDE 2 MILLIGRAM(S): 2 INJECTION INTRAMUSCULAR; INTRAVENOUS; SUBCUTANEOUS at 09:12

## 2018-03-09 NOTE — PROGRESS NOTE ADULT - PROBLEM SELECTOR PLAN 1
continue conservative care  if no improvement  will plan on surgery next week  all questions answered  dulc/enema ordered

## 2018-03-10 RX ADMIN — FAMOTIDINE 20 MILLIGRAM(S): 10 INJECTION INTRAVENOUS at 17:19

## 2018-03-10 RX ADMIN — HYDROMORPHONE HYDROCHLORIDE 2 MILLIGRAM(S): 2 INJECTION INTRAMUSCULAR; INTRAVENOUS; SUBCUTANEOUS at 23:11

## 2018-03-10 RX ADMIN — HYDROMORPHONE HYDROCHLORIDE 2 MILLIGRAM(S): 2 INJECTION INTRAMUSCULAR; INTRAVENOUS; SUBCUTANEOUS at 12:09

## 2018-03-10 RX ADMIN — HEPARIN SODIUM 5000 UNIT(S): 5000 INJECTION INTRAVENOUS; SUBCUTANEOUS at 05:53

## 2018-03-10 RX ADMIN — Medication 2 SPRAY(S): at 12:09

## 2018-03-10 RX ADMIN — HEPARIN SODIUM 5000 UNIT(S): 5000 INJECTION INTRAVENOUS; SUBCUTANEOUS at 17:19

## 2018-03-10 RX ADMIN — FAMOTIDINE 20 MILLIGRAM(S): 10 INJECTION INTRAVENOUS at 05:53

## 2018-03-10 RX ADMIN — Medication 100 MILLIGRAM(S): at 05:53

## 2018-03-11 RX ADMIN — Medication 100 MILLIGRAM(S): at 10:44

## 2018-03-11 RX ADMIN — Medication 2 SPRAY(S): at 10:42

## 2018-03-11 RX ADMIN — ONDANSETRON 4 MILLIGRAM(S): 8 TABLET, FILM COATED ORAL at 11:42

## 2018-03-11 RX ADMIN — Medication 100 MILLIGRAM(S): at 20:56

## 2018-03-11 RX ADMIN — HYDROMORPHONE HYDROCHLORIDE 2 MILLIGRAM(S): 2 INJECTION INTRAMUSCULAR; INTRAVENOUS; SUBCUTANEOUS at 22:13

## 2018-03-11 RX ADMIN — FAMOTIDINE 20 MILLIGRAM(S): 10 INJECTION INTRAVENOUS at 05:17

## 2018-03-11 RX ADMIN — FAMOTIDINE 20 MILLIGRAM(S): 10 INJECTION INTRAVENOUS at 17:05

## 2018-03-11 RX ADMIN — HYDROMORPHONE HYDROCHLORIDE 2 MILLIGRAM(S): 2 INJECTION INTRAMUSCULAR; INTRAVENOUS; SUBCUTANEOUS at 10:45

## 2018-03-11 RX ADMIN — HYDROMORPHONE HYDROCHLORIDE 2 MILLIGRAM(S): 2 INJECTION INTRAMUSCULAR; INTRAVENOUS; SUBCUTANEOUS at 17:14

## 2018-03-11 NOTE — PROGRESS NOTE ADULT - PROBLEM SELECTOR PLAN 1
pt considering surgery for tuesday  all questions answered pt considering surgery for tuesday  all questions answered  discussed options with pt  pt frustrated  not making progress    All questions answered

## 2018-03-12 LAB
BLD GP AB SCN SERPL QL: SIGNIFICANT CHANGE UP
TYPE + AB SCN PNL BLD: SIGNIFICANT CHANGE UP

## 2018-03-12 RX ORDER — SODIUM CHLORIDE 9 MG/ML
1000 INJECTION INTRAMUSCULAR; INTRAVENOUS; SUBCUTANEOUS
Qty: 0 | Refills: 0 | Status: DISCONTINUED | OUTPATIENT
Start: 2018-03-12 | End: 2018-03-13

## 2018-03-12 RX ADMIN — HYDROMORPHONE HYDROCHLORIDE 2 MILLIGRAM(S): 2 INJECTION INTRAMUSCULAR; INTRAVENOUS; SUBCUTANEOUS at 06:15

## 2018-03-12 RX ADMIN — FAMOTIDINE 20 MILLIGRAM(S): 10 INJECTION INTRAVENOUS at 05:34

## 2018-03-12 RX ADMIN — Medication 100 MILLIGRAM(S): at 05:34

## 2018-03-12 RX ADMIN — SODIUM CHLORIDE 75 MILLILITER(S): 9 INJECTION INTRAMUSCULAR; INTRAVENOUS; SUBCUTANEOUS at 23:53

## 2018-03-12 RX ADMIN — Medication 100 MILLIGRAM(S): at 21:32

## 2018-03-12 RX ADMIN — HYDROMORPHONE HYDROCHLORIDE 2 MILLIGRAM(S): 2 INJECTION INTRAMUSCULAR; INTRAVENOUS; SUBCUTANEOUS at 15:01

## 2018-03-12 RX ADMIN — Medication 100 MILLIGRAM(S): at 12:55

## 2018-03-12 RX ADMIN — FAMOTIDINE 20 MILLIGRAM(S): 10 INJECTION INTRAVENOUS at 17:42

## 2018-03-12 RX ADMIN — HYDROMORPHONE HYDROCHLORIDE 2 MILLIGRAM(S): 2 INJECTION INTRAMUSCULAR; INTRAVENOUS; SUBCUTANEOUS at 19:04

## 2018-03-12 RX ADMIN — Medication 2 SPRAY(S): at 11:45

## 2018-03-12 RX ADMIN — HYDROMORPHONE HYDROCHLORIDE 2 MILLIGRAM(S): 2 INJECTION INTRAMUSCULAR; INTRAVENOUS; SUBCUTANEOUS at 12:55

## 2018-03-12 RX ADMIN — HYDROMORPHONE HYDROCHLORIDE 2 MILLIGRAM(S): 2 INJECTION INTRAMUSCULAR; INTRAVENOUS; SUBCUTANEOUS at 23:52

## 2018-03-12 NOTE — PROGRESS NOTE ADULT - PROBLEM SELECTOR PLAN 1
options discussed w pt  risks/benefits/alternatives discussed w pt  risks of anesthesia  infection neurovascular compromise failure of procedure discussed w pt   possible rec HNP  possible csf leak discussed w pt      all questions answered  will proceed as recommended

## 2018-03-13 ENCOUNTER — RESULT REVIEW (OUTPATIENT)
Age: 53
End: 2018-03-13

## 2018-03-13 PROCEDURE — 88304 TISSUE EXAM BY PATHOLOGIST: CPT | Mod: 26

## 2018-03-13 RX ORDER — ACETAMINOPHEN 500 MG
650 TABLET ORAL EVERY 6 HOURS
Qty: 0 | Refills: 0 | Status: DISCONTINUED | OUTPATIENT
Start: 2018-03-13 | End: 2018-03-14

## 2018-03-13 RX ORDER — POLYETHYLENE GLYCOL 3350 17 G/17G
17 POWDER, FOR SOLUTION ORAL DAILY
Qty: 0 | Refills: 0 | Status: DISCONTINUED | OUTPATIENT
Start: 2018-03-13 | End: 2018-03-14

## 2018-03-13 RX ORDER — SODIUM CHLORIDE 9 MG/ML
1000 INJECTION INTRAMUSCULAR; INTRAVENOUS; SUBCUTANEOUS
Qty: 0 | Refills: 0 | Status: DISCONTINUED | OUTPATIENT
Start: 2018-03-13 | End: 2018-03-13

## 2018-03-13 RX ORDER — HYDROMORPHONE HYDROCHLORIDE 2 MG/ML
2 INJECTION INTRAMUSCULAR; INTRAVENOUS; SUBCUTANEOUS
Qty: 0 | Refills: 0 | Status: DISCONTINUED | OUTPATIENT
Start: 2018-03-13 | End: 2018-03-14

## 2018-03-13 RX ORDER — ONDANSETRON 8 MG/1
4 TABLET, FILM COATED ORAL ONCE
Qty: 0 | Refills: 0 | Status: DISCONTINUED | OUTPATIENT
Start: 2018-03-13 | End: 2018-03-13

## 2018-03-13 RX ORDER — FAMOTIDINE 10 MG/ML
20 INJECTION INTRAVENOUS EVERY 12 HOURS
Qty: 0 | Refills: 0 | Status: DISCONTINUED | OUTPATIENT
Start: 2018-03-13 | End: 2018-03-14

## 2018-03-13 RX ORDER — FENTANYL CITRATE 50 UG/ML
50 INJECTION INTRAVENOUS
Qty: 0 | Refills: 0 | Status: DISCONTINUED | OUTPATIENT
Start: 2018-03-13 | End: 2018-03-13

## 2018-03-13 RX ORDER — MEPERIDINE HYDROCHLORIDE 50 MG/ML
12.5 INJECTION INTRAMUSCULAR; INTRAVENOUS; SUBCUTANEOUS
Qty: 0 | Refills: 0 | Status: DISCONTINUED | OUTPATIENT
Start: 2018-03-13 | End: 2018-03-13

## 2018-03-13 RX ORDER — HYDROMORPHONE HYDROCHLORIDE 2 MG/ML
1 INJECTION INTRAMUSCULAR; INTRAVENOUS; SUBCUTANEOUS
Qty: 0 | Refills: 0 | Status: DISCONTINUED | OUTPATIENT
Start: 2018-03-13 | End: 2018-03-13

## 2018-03-13 RX ORDER — CEFAZOLIN SODIUM 1 G
2000 VIAL (EA) INJECTION EVERY 8 HOURS
Qty: 0 | Refills: 0 | Status: COMPLETED | OUTPATIENT
Start: 2018-03-13 | End: 2018-03-14

## 2018-03-13 RX ORDER — ACETAMINOPHEN 500 MG
1000 TABLET ORAL ONCE
Qty: 0 | Refills: 0 | Status: DISCONTINUED | OUTPATIENT
Start: 2018-03-13 | End: 2018-03-13

## 2018-03-13 RX ORDER — DIPHENHYDRAMINE HCL 50 MG
25 CAPSULE ORAL AT BEDTIME
Qty: 0 | Refills: 0 | Status: DISCONTINUED | OUTPATIENT
Start: 2018-03-13 | End: 2018-03-14

## 2018-03-13 RX ORDER — OXYCODONE HYDROCHLORIDE 5 MG/1
5 TABLET ORAL EVERY 4 HOURS
Qty: 0 | Refills: 0 | Status: DISCONTINUED | OUTPATIENT
Start: 2018-03-13 | End: 2018-03-13

## 2018-03-13 RX ORDER — DOCUSATE SODIUM 100 MG
100 CAPSULE ORAL THREE TIMES A DAY
Qty: 0 | Refills: 0 | Status: DISCONTINUED | OUTPATIENT
Start: 2018-03-13 | End: 2018-03-14

## 2018-03-13 RX ORDER — FLUTICASONE PROPIONATE 50 MCG
2 SPRAY, SUSPENSION NASAL DAILY
Qty: 0 | Refills: 0 | Status: DISCONTINUED | OUTPATIENT
Start: 2018-03-13 | End: 2018-03-14

## 2018-03-13 RX ORDER — SODIUM CHLORIDE 9 MG/ML
1000 INJECTION, SOLUTION INTRAVENOUS
Qty: 0 | Refills: 0 | Status: DISCONTINUED | OUTPATIENT
Start: 2018-03-13 | End: 2018-03-14

## 2018-03-13 RX ORDER — CEFAZOLIN SODIUM 1 G
2000 VIAL (EA) INJECTION EVERY 8 HOURS
Qty: 0 | Refills: 0 | Status: DISCONTINUED | OUTPATIENT
Start: 2018-03-13 | End: 2018-03-13

## 2018-03-13 RX ADMIN — HYDROMORPHONE HYDROCHLORIDE 1 MILLIGRAM(S): 2 INJECTION INTRAMUSCULAR; INTRAVENOUS; SUBCUTANEOUS at 16:49

## 2018-03-13 RX ADMIN — Medication 100 MILLIGRAM(S): at 22:07

## 2018-03-13 RX ADMIN — FENTANYL CITRATE 50 MICROGRAM(S): 50 INJECTION INTRAVENOUS at 16:17

## 2018-03-13 RX ADMIN — HYDROMORPHONE HYDROCHLORIDE 2 MILLIGRAM(S): 2 INJECTION INTRAMUSCULAR; INTRAVENOUS; SUBCUTANEOUS at 10:27

## 2018-03-13 RX ADMIN — FENTANYL CITRATE 50 MICROGRAM(S): 50 INJECTION INTRAVENOUS at 16:12

## 2018-03-13 RX ADMIN — HYDROMORPHONE HYDROCHLORIDE 2 MILLIGRAM(S): 2 INJECTION INTRAMUSCULAR; INTRAVENOUS; SUBCUTANEOUS at 12:38

## 2018-03-13 RX ADMIN — FENTANYL CITRATE 50 MICROGRAM(S): 50 INJECTION INTRAVENOUS at 16:25

## 2018-03-13 RX ADMIN — HYDROMORPHONE HYDROCHLORIDE 2 MILLIGRAM(S): 2 INJECTION INTRAMUSCULAR; INTRAVENOUS; SUBCUTANEOUS at 20:22

## 2018-03-13 RX ADMIN — HYDROMORPHONE HYDROCHLORIDE 1 MILLIGRAM(S): 2 INJECTION INTRAMUSCULAR; INTRAVENOUS; SUBCUTANEOUS at 16:50

## 2018-03-13 NOTE — PROGRESS NOTE ADULT - PROBLEM SELECTOR PLAN 1
for OR today  Risks/ benefits /alternatives discussed w pt  anesthesia, infection, neurovascular compromise  failure of procedure  possible recurrent HNP    discussed w pt  all questions answered     will proceed as recommended when time available later today

## 2018-03-13 NOTE — PROGRESS NOTE ADULT - PROBLEM SELECTOR PLAN 1
Review of MRI reveals huge extr disc at L45 on left and a small disc HNP on Left at L5S1    discussed w pt  will plan surgery today  consent obtained from pt  all questions answered  risks benefits alternatives discussed  risks  anesthesia  infection  neurovascular compromise  failure of procedure  recurrence of HNP or CSF leak or other problems discussed    all questions answered  will proceed as recommended

## 2018-03-13 NOTE — BRIEF OPERATIVE NOTE - PRE-OP DX
Lumbar herniated disc  03/13/2018    Active  Gil Duke  Lumbar radiculopathy, acute  03/06/2018    Active  Amol Nicole

## 2018-03-13 NOTE — BRIEF OPERATIVE NOTE - PROCEDURE
<<-----Click on this checkbox to enter Procedure Lumbar discectomy at one or two levels  03/13/2018    Active  LAURITA

## 2018-03-14 ENCOUNTER — TRANSCRIPTION ENCOUNTER (OUTPATIENT)
Age: 53
End: 2018-03-14

## 2018-03-14 VITALS
RESPIRATION RATE: 18 BRPM | HEART RATE: 84 BPM | SYSTOLIC BLOOD PRESSURE: 117 MMHG | OXYGEN SATURATION: 98 % | DIASTOLIC BLOOD PRESSURE: 77 MMHG | TEMPERATURE: 98 F

## 2018-03-14 DIAGNOSIS — M96.1 POSTLAMINECTOMY SYNDROME, NOT ELSEWHERE CLASSIFIED: ICD-10-CM

## 2018-03-14 RX ORDER — ACETAMINOPHEN 500 MG
650 TABLET ORAL EVERY 4 HOURS
Qty: 0 | Refills: 0 | Status: DISCONTINUED | OUTPATIENT
Start: 2018-03-14 | End: 2018-03-14

## 2018-03-14 RX ORDER — POLYETHYLENE GLYCOL 3350 17 G/17G
17 POWDER, FOR SOLUTION ORAL
Qty: 0 | Refills: 0 | COMMUNITY
Start: 2018-03-14

## 2018-03-14 RX ORDER — ACETAMINOPHEN 500 MG
2 TABLET ORAL
Qty: 0 | Refills: 0 | COMMUNITY
Start: 2018-03-14

## 2018-03-14 RX ORDER — DIPHENHYDRAMINE HCL 50 MG
1 CAPSULE ORAL
Qty: 0 | Refills: 0 | COMMUNITY
Start: 2018-03-14

## 2018-03-14 RX ORDER — DOCUSATE SODIUM 100 MG
1 CAPSULE ORAL
Qty: 0 | Refills: 0 | COMMUNITY
Start: 2018-03-14

## 2018-03-14 RX ADMIN — Medication 2 SPRAY(S): at 11:41

## 2018-03-14 RX ADMIN — Medication 100 MILLIGRAM(S): at 05:34

## 2018-03-14 RX ADMIN — HYDROMORPHONE HYDROCHLORIDE 2 MILLIGRAM(S): 2 INJECTION INTRAMUSCULAR; INTRAVENOUS; SUBCUTANEOUS at 11:11

## 2018-03-14 RX ADMIN — HYDROMORPHONE HYDROCHLORIDE 2 MILLIGRAM(S): 2 INJECTION INTRAMUSCULAR; INTRAVENOUS; SUBCUTANEOUS at 18:49

## 2018-03-14 RX ADMIN — Medication 100 MILLIGRAM(S): at 14:08

## 2018-03-14 RX ADMIN — HYDROMORPHONE HYDROCHLORIDE 0.5 MILLIGRAM(S): 2 INJECTION INTRAMUSCULAR; INTRAVENOUS; SUBCUTANEOUS at 11:02

## 2018-03-14 RX ADMIN — Medication 100 MILLIGRAM(S): at 05:35

## 2018-03-14 RX ADMIN — FAMOTIDINE 20 MILLIGRAM(S): 10 INJECTION INTRAVENOUS at 05:34

## 2018-03-14 RX ADMIN — FAMOTIDINE 20 MILLIGRAM(S): 10 INJECTION INTRAVENOUS at 17:33

## 2018-03-14 RX ADMIN — HYDROMORPHONE HYDROCHLORIDE 2 MILLIGRAM(S): 2 INJECTION INTRAMUSCULAR; INTRAVENOUS; SUBCUTANEOUS at 05:33

## 2018-03-14 RX ADMIN — SODIUM CHLORIDE 75 MILLILITER(S): 9 INJECTION, SOLUTION INTRAVENOUS at 07:50

## 2018-03-14 NOTE — DISCHARGE NOTE ADULT - PLAN OF CARE
independence ambulate  recover  increase activities as mauricio ambulate ambulate  recover  increase activities as mauricio  start Tylenol at home  as well as Norco 10/325 I- ii tab po q4hr for pian  start neurontin 300 mg ar bedtime  call if any problems

## 2018-03-14 NOTE — PROGRESS NOTE ADULT - NEGATIVE GENERAL SYMPTOMS
no chills/no fever
no fever/no chills

## 2018-03-14 NOTE — DISCHARGE NOTE ADULT - PATIENT PORTAL LINK FT
You can access the [facility] Patient Portal, offered by [facility], by registering with the following website: [email or web address]

## 2018-03-14 NOTE — DIETITIAN INITIAL EVALUATION ADULT. - FACTORS AFF FOOD INTAKE
appetite returning; fearful of eating too much due to constipation/persistent constipation/persistent lack of appetite

## 2018-03-14 NOTE — PHYSICAL THERAPY INITIAL EVALUATION ADULT - CRITERIA FOR SKILLED THERAPEUTIC INTERVENTIONS
anticipated discharge recommendation/anticipated equipment needs at discharge/impairments found
impairments found

## 2018-03-14 NOTE — PHYSICAL THERAPY INITIAL EVALUATION ADULT - IMPAIRMENTS FOUND, PT EVAL
muscle strength/aerobic capacity/endurance/gait, locomotion, and balance
gait, locomotion, and balance

## 2018-03-14 NOTE — DISCHARGE NOTE ADULT - INSTRUCTIONS
reg diet Follow up if temperature greater than 101, any redness, purulent drainage or foul odor noted from surgical site. Call MD or go to ED.

## 2018-03-14 NOTE — PROGRESS NOTE ADULT - RS GEN PE MLT RESP DETAILS PC
good air movement/breath sounds equal/respirations non-labored/clear to auscultation bilaterally
breath sounds equal/respirations non-labored/clear to auscultation bilaterally
clear to auscultation bilaterally/respirations non-labored/good air movement/breath sounds equal
breath sounds equal/clear to auscultation bilaterally/respirations non-labored
clear to auscultation bilaterally/respirations non-labored/breath sounds equal
respirations non-labored/breath sounds equal/clear to auscultation bilaterally
respirations non-labored/good air movement/clear to auscultation bilaterally/breath sounds equal
good air movement/clear to auscultation bilaterally/breath sounds equal
respirations non-labored/breath sounds equal/clear to auscultation bilaterally

## 2018-03-14 NOTE — DISCHARGE NOTE ADULT - CARE PLAN
Principal Discharge DX:	Lumbar herniated disc  Goal:	independence  Assessment and plan of treatment:	ambulate  recover  increase activities as mauricio  Secondary Diagnosis:	Postlaminectomy syndrome of lumbar region  Goal:	ambulate Principal Discharge DX:	Lumbar herniated disc  Goal:	independence  Assessment and plan of treatment:	ambulate  recover  increase activities as mauricio  start Tylenol at home  as well as Norco 10/325 I- ii tab po q4hr for pian  start neurontin 300 mg ar bedtime  call if any problems  Secondary Diagnosis:	Postlaminectomy syndrome of lumbar region  Goal:	ambulate

## 2018-03-14 NOTE — DISCHARGE NOTE ADULT - NS AS ACTIVITY OBS
Walking-Outdoors allowed/Walking-Indoors allowed/No Heavy lifting/straining/Showering allowed/Sex allowed/Stairs allowed/Do not make important decisions

## 2018-03-14 NOTE — PROGRESS NOTE ADULT - [facility] PE MLT D E PC
regular rate and rhythm

## 2018-03-14 NOTE — PROGRESS NOTE ADULT - ASSESSMENT
HNP Rt L45, Rt L5S1  still extremely uncomfortable
HNP rt L45, L5s1  Persistent intractable sciatica Rt  +++ constipation
HNP rt L45,L5S1
HNP rt L45,L5S1    RT SCiatica  severe    Failed conservative care  unable to get OOB wo pain
HNP rt L45,Rt L5S1
Intractable rt sciatica persists  HNP RT L45,L5S1
MRI + HNP rt L45 and DDD L5S1 w hnp rt and left L5S1      will start decadron taper  zantac  pain meds  PT  Ct scan LS spine
RT sciatica  HNP rt
Review of MRI reveals huge extr disc at L45 on left and a small disc HNP on Left at L5S1
intractable rt sciatica
rt sciatica   HNP Rt L45, L5S1  steroids ineffective
rt sciatica  LBP
s/p lami discectomy
severe pain continues but a little better  needs PT  discussed w pt  will try t o alternate IV and po pain meds  will try PT and try to mobilize pt to go home tomorrow
Pt is a 51 y/o male with no significant past medical history who few weeks ago developed LBP with radiation down his RLE.  He was started on steroids, tramadol and antiinflammatory agents w/o improvement.  In fact his symptoms progressed with intractable LBP with inability to ambulate therefore he came to  where he was admitted by Dr Duke.  Medicine consult called for comanagement.       * LBP-due to L5-S1 Disc Herniation-failed steroids, s/p STONE, still no significant improvement.  Continue Dilaudid, PT, plans for surgery since he failed conservative measures.  Pt denies any CHF or anginal symptoms, HEMPHILL risk of 0.01% for periop MI or cardiac arrest.  Pt has low clinical predictors for intermediate risk surgery, he is medically optimized      * Constipation-continue colace, miralax, dulcolox.  * Disp-OOB, ambulate   * Proph- continue heparin (hold for surgery after MN).  * Comm- d/w pt in details, all questions answered, RN
Pt is a 51 y/o male with no significant past medical history who few weeks ago developed LBP with radiation down his RLE.  He was started on steroids, tramadol and antiinflammatory agents w/o improvement.  In fact his symptoms progressed with intractable LBP with inability to ambulate therefore he came to  where he was admitted by Dr Duke.  Medicine consult called for comanagement.       * LBP-due to L5-S1 Disc Herniation-failed steroids, s/p STONE, was improving but worse today.  Continue Dilaudid, PT, may need surgery since he is failing all conservative measures.  * Constipation-continue colace, miralax, dulcolox.  * Disp-OOB, ambulate   * Proph- continue heparin   * Comm- d/w pt in details, all questions answered, RN
Pt is a 53 y/o male with no significant past medical history who few weeks ago developed LBP with radiation down his RLE.  He was started on steroids, tramadol and antiinflammatory agents w/o improvement.  In fact his symptoms progressed with intractable LBP with inability to ambulate therefore he came to  where he was admitted by Dr Duke.  Medicine consult called for comanagement.       * LBP-due to L5-S1 Disc Herniation-failed steroids, STONE and supportive care no s/p L4-L5, L5-S1 discectomy, doing much better.  Continue pain control, PT.  * Constipation-continue colace, miralax, dulcolox.  * Disp-OOB, ambulate, d/c planning  * Proph- venodynes, heparin if okay with spine surgery  * Comm- d/w pt in details, all questions answered, RN
Pt is a 51 y/o male with no significant past medical history who few weeks ago developed LBP with radiation down his RLE.  He was started on steroids, tramadol and antiinflammatory agents w/o improvement.  In fact his symptoms progressed with intractable LBP with inability to ambulate therefore he came to  where he was admitted by Dr Duke.  Medicine consult called for comanagement.       * LBP-due to L5-S1 Disc Herniation-failed steroids, s/p STNOE with minimal response.  Continue Dilaudid, PT and monitor as STONE can take a little time to kick in.   * Constipation-add miralax to colace, magnesium citrate prn  * Disp-OOB, ambulate as pt tolerates  * Proph- continue venodynes, heparin if okay with Dr Duke  * Comm- d/w pt, girlfriend, RN
Pt is a 51 y/o male with no significant past medical history who few weeks ago developed LBP with radiation down his RLE.  He was started on steroids, tramadol and antiinflammatory agents w/o improvement.  In fact his symptoms progressed with intractable LBP with inability to ambulate therefore he came to  where he was admitted by Dr uDke.  Medicine consult called for comanagement.       * LBP-due to L5-S1 Disc Herniation-failed steroids continue Dilaudid PCA, await STONE if fails than may need surgery.  Continue pain control with dilaudid pca.  * Disp-OOB, ambulate as pt tolerates  * Proph- continue venodynes, consider heparin after STONE.  * Comm- d/w pt, RN
Pt is a 53 y/o male with no significant past medical history who few weeks ago developed LBP with radiation down his RLE.  He was started on steroids, tramadol and antiinflammatory agents w/o improvement.  In fact his symptoms progressed with intractable LBP with inability to ambulate therefore he came to  where he was admitted by Dr Duke.  Medicine consult called for comanagement.       * LBP-due to L5-S1 Disc Herniation-failed steroids, s/p STONE, slowly improving.  Continue Dilaudid, PT and monitor as STONE can take a little time to kick in.   * Constipation-again reviewed importance of laxatives and bowel regimen with patient, pt states he will think about, reviewed risk.     * Disp-OOB, ambulate as pt tolerates  * Proph- continue heparin   * Comm- d/w pt, RN
Pt is a 53 y/o male with no significant past medical history who few weeks ago developed LBP with radiation down his RLE.  He was started on steroids, tramadol and antiinflammatory agents w/o improvement.  In fact his symptoms progressed with intractable LBP with inability to ambulate therefore he came to  where he was admitted by Dr Duke.  Medicine consult called for comanagement.       * LBP-due to L5-S1 Disc Herniation-failed steroids, s/p STONE, slowly improving.  Continue Dilaudid, PT and monitor as STONE can take a little time to kick in. If no significant response than may need surgery.  * Constipation-again reviewed importance of laxatives and bowel regimen with patient, continue colace, miralax, dulcolox.  * Disp-OOB, ambulate   * Proph- continue heparin   * Comm- d/w pt
Pt is a 53 y/o male with no significant past medical history who few weeks ago developed LBP with radiation down his RLE.  He was started on steroids, tramadol and antiinflammatory agents w/o improvement.  In fact his symptoms progressed with intractable LBP with inability to ambulate therefore he came to  where he was admitted by Dr Duke.  Medicine consult called for comanagement.       * LBP-due to L5-S1 Disc Herniation-failed steroids, s/p STONE, still no significant improvement.  Continue Dilaudid, PT, plans for surgery since he failed conservative measures.  Pt denies any CHF or anginal symptoms, HEMPHILL risk of 0.01% for periop MI or cardiac arrest.  Pt has low clinical predictors for intermediate risk surgery, he is medically optimized for his spine surgery.    * Constipation-continue colace, miralax, dulcolox.  * Disp-OOB, ambulate   * Proph- heparin on hold for surgery   * Comm- d/w pt in details, all questions answered, RN
Pt is a 51 y/o male with no significant past medical history who few weeks ago developed LBP with radiation down his RLE.  He was started on steroids, tramadol and antiinflammatory agents w/o improvement.  In fact his symptoms progressed with intractable LBP with inability to ambulate therefore he came to  where he was admitted by Dr Duke.  Medicine consult called for comanagement.       * LBP-due to L5-S1 Disc Herniation-failed steroids, s/p STONE, slowly improving.  Continue Dilaudid, PT and monitor as STONE can take a little time to kick in. If no significant response than may need surgery next week.  * Constipation-again reviewed importance of laxatives and bowel regimen with patient, continue colace, miralax, dulcolox.  * Disp-OOB, ambulate as pt tolerates with PT.  * Proph- continue heparin   * Comm- d/w pt, RN

## 2018-03-14 NOTE — PHYSICAL THERAPY INITIAL EVALUATION ADULT - GAIT DEVIATIONS NOTED, PT EVAL
decreased ernie/decreased stride length
pt with difficulty WB through RLE with progressive ambulation/decreased step length/decreased ernie/decreased weight-shifting ability

## 2018-03-14 NOTE — PROGRESS NOTE ADULT - NEUROLOGICAL DETAILS
alert and oriented x 3

## 2018-03-14 NOTE — PROGRESS NOTE ADULT - MS EXT PE MLT D E PC
no clubbing/no cyanosis
no cyanosis/no clubbing
no pedal edema/no clubbing/no cyanosis
no clubbing/no cyanosis
no cyanosis/no clubbing
no cyanosis/no clubbing
no clubbing/no cyanosis
no clubbing/no cyanosis
no cyanosis/no clubbing

## 2018-03-14 NOTE — DIETITIAN INITIAL EVALUATION ADULT. - OTHER INFO
Seen for LOS. Pt is 54yo male admitted with sciatica and s/p spinal fusion surgery (3/13). PMH of herniated lumbar disk. No other pertinent medical or surgical history. Pt reports losing 46#/20.9kg since 5/17, upon advice from doctor, when HbA1C was found to be 5.1. Pt has maintained weight loss. Reports poor appetite since 3/2 admit, though feels it's returning, and was unchanged PTA. Pt eats a regular diet at home and expects to resume regular diet order as progresses from clear liquids, though recommend changing order to Consistent CHO diet with evening snack. Pt reports nervousness about eating, as he's been very constipated 2/2 opiate pain medication regimen. Having BM's about every 3-4 days. No edema, skin intact with no PU's. Pt slightly overweight but otherwise appears well-nourished. RECOMMENDATIONS: 1) When diet advanced, change diet order from regular to Consistent CHO with evening snack, 2) Ensure Enlive BID to increase protein intake for wound healing, 3) Add MVI to ensure pt meets RDI, 4) Monitor PO intake, 5) Monitor weekly weights. Seen for LOS. Pt is 54yo male admitted with sciatica and s/p spinal fusion surgery (3/13). PMH of herniated lumbar disk. No other pertinent medical or surgical history. Pt reports losing 46#/20.9kg since 5/17, upon advice from doctor, when HbA1C was found to be 5.1. Pt has maintained weight loss. Reports poor appetite since 3/2 admit, though feels it's returning, and was unchanged PTA. Pt eats a regular diet at home and expects to resume regular diet order as progresses from clear liquids. Pt reports nervousness about eating, as he's been very constipated 2/2 opiate pain medication regimen. Having BM's about every 3-4 days. No edema, skin intact with no PU's. Pt slightly overweight but otherwise appears well-nourished. RECOMMENDATIONS:  2) Ensure Enlive BID to increase protein intake for wound healing, 3) Add MVI to ensure pt meets RDI, 4) Monitor PO intake, 5) Monitor weekly weights.

## 2018-03-14 NOTE — PHYSICAL THERAPY INITIAL EVALUATION ADULT - MODALITIES TREATMENT COMMENTS
pt left in bed L S/L post Eval; unable to mauricio sitting in chair; pt instructed not to get up alone; call nursing for assist; mauricio fair pain level 8/10; TAZ Powers made aware; bed alarm not on due to malfunction; TAZ Powers made aware

## 2018-03-14 NOTE — DISCHARGE NOTE ADULT - MEDICATION SUMMARY - MEDICATIONS TO TAKE
I will START or STAY ON the medications listed below when I get home from the hospital:    acetaminophen 325 mg oral tablet  -- 2 tab(s) by mouth every 6 hours, As needed, For Temp greater than 38 C (100.4 F)  -- Indication: For Lumbar herniated disc    acetaminophen 325 mg oral tablet  -- 2 tab(s) by mouth every 4 hours, As needed, Mild Pain (1 - 3)  -- Indication: For Lumbar herniated disc    diphenhydrAMINE 25 mg oral capsule  -- 1 cap(s) by mouth once a day (at bedtime), As needed, Insomnia  -- Indication: For Postlaminectomy syndrome of lumbar region    docusate sodium 100 mg oral capsule  -- 1 cap(s) by mouth 3 times a day  -- Indication: For Postlaminectomy syndrome of lumbar region    polyethylene glycol 3350 oral powder for reconstitution  -- 17 gram(s) by mouth once a day  -- Indication: For Lumbar herniated disc    fluticasone nasal 50 mcg/inh nasal spray  -- 2 spray(s) into nose once a day  -- For the nose.  It is very important that you take or use this exactly as directed.  Do not skip doses or discontinue unless directed by your doctor.    -- Indication: For Lumbar herniated disc

## 2018-03-14 NOTE — PROGRESS NOTE ADULT - NECK DETAILS
no JVD/supple
no JVD/supple
supple/no JVD
no JVD/supple
supple/no JVD

## 2018-03-14 NOTE — DISCHARGE NOTE ADULT - NSTOBACCOREFERRAL_GEN_A_CS
HPI     Eye Exam    Additional comments: Yearly           Comments   Last seen by [facility] for yearly eye exam. Patient here today for yearly exam.  1. FH glaucoma  2. Cortical Cataract OU  HPI    Any vision changes since last exam: No  Eye pain: No  Other ocular symptoms: No    Do you wear currently wear glasses or contacts? Glasses    Interested in contacts today? No    Do you plan on getting new glasses today? If needed       Last edited by Marielena Reese, OD on 11/20/2017  2:25 PM. (History)            Assessment /Plan     For exam results, see Encounter Report.    Family history of glaucoma    Cataract cortical, senile, bilateral    Hyperopia with presbyopia, bilateral    Cataract(s) not yet affecting activities of daily living, therefore, surgery consult is not yet indicated.  Glaucoma screening and fundus exam normal.  Updated glasses prescription.  Return to clinic 1 yr.                   
Patient declined information

## 2018-03-14 NOTE — DISCHARGE NOTE ADULT - CARE PROVIDER_API CALL
Gil Duke), Orthopaedic Surgery  [address] [place] [address]  Phone: [phone]  Fax: [phone]

## 2018-03-14 NOTE — PROGRESS NOTE ADULT - SUBJECTIVE AND OBJECTIVE BOX
C/o Rt leg pain  difficulty putting wt on rt foot  + SLR Rt 20-30 deg  neg SLR Left  weak Rt TA 4/5  rest wnl    MRI + HNP Rt L45, L5S1    steroids ineffective
C/o severe rt leg pain on wb  a little better than yesterday  neuro 5-/5n rt TA  + SLR rt at 30  + contralat SLR at 45    on IV dilaudid
POD#1  Better  C/O incisional pain  sl discomfort in back and rt leg  but better  dressing   mild heme drainage  OOb walking    plan dc home later today
Pt feels slightly better although has not gotten out of bed yet.  No BM for 1 week, refusing miralax, mag citrate.  No abd pain.    Vital Signs Last 24 Hrs  T(C): 37.3 (08 Mar 2018 04:59), Max: 37.3 (08 Mar 2018 04:59)  T(F): 99.2 (08 Mar 2018 04:59), Max: 99.2 (08 Mar 2018 04:59)  HR: 79 (08 Mar 2018 04:59) (62 - 84)  BP: 127/84 (08 Mar 2018 04:59) (114/68 - 129/61)  BP(mean): --  RR: 18 (07 Mar 2018 21:30) (16 - 18)  SpO2: 94% (08 Mar 2018 04:59) (94% - 97%)    Daily     Daily     I&O's Detail    07 Mar 2018 07:01  -  08 Mar 2018 07:00  --------------------------------------------------------  IN:    Oral Fluid: 100 mL  Total IN: 100 mL    OUT:  Total OUT: 0 mL    Total NET: 100 mL          CAPILLARY BLOOD GLUCOSE                        03-08    138  |  103  |  25<H>  ----------------------------<  104<H>  4.2   |  28  |  0.91    Ca    9.4      08 Mar 2018 07:17                        MEDICATIONS  (STANDING):  docusate sodium 100 milliGRAM(s) Oral three times a day  famotidine    Tablet 20 milliGRAM(s) Oral every 12 hours  fluticasone propionate 50 MICROgram(s)/spray Nasal Spray 2 Spray(s) Both Nostrils daily  heparin  Injectable 5000 Unit(s) SubCutaneous every 12 hours  polyethylene glycol 3350 17 Gram(s) Oral daily    MEDICATIONS  (PRN):  acetaminophen   Tablet 650 milliGRAM(s) Oral every 6 hours PRN For Temp greater than 38 C (100.4 F)  acetaminophen   Tablet. 650 milliGRAM(s) Oral every 6 hours PRN headache  bisacodyl Suppository 10 milliGRAM(s) Rectal daily PRN If no bowel movement by POD#2  diphenhydrAMINE   Capsule 25 milliGRAM(s) Oral at bedtime PRN Insomnia  HYDROmorphone  Injectable 0.5 milliGRAM(s) IV Push every 10 minutes PRN Severe Pain (7 - 10)  HYDROmorphone  Injectable 2 milliGRAM(s) IV Push every 3 hours PRN Severe Pain (7 - 10)  magnesium citrate Solution 300 milliLiter(s) Oral daily PRN Constipation  naloxone Injectable 0.1 milliGRAM(s) IV Push every 3 minutes PRN For ANY of the following changes in patient status:  A. RR LESS THAN 10 breaths per minute, B. Oxygen saturation LESS THAN 90%, C. Sedation score of 6  ondansetron Injectable 4 milliGRAM(s) IV Push every 6 hours PRN Nausea  ondansetron Injectable 4 milliGRAM(s) IV Push once PRN Nausea and/or Vomiting
Pt feels slightly better, still c/o LBP, was constipated but had a large BM after supp.      Vital Signs Last 24 Hrs  T(C): 37 (09 Mar 2018 05:15), Max: 37.2 (08 Mar 2018 17:16)  T(F): 98.6 (09 Mar 2018 05:15), Max: 98.9 (08 Mar 2018 17:16)  HR: 83 (09 Mar 2018 05:15) (74 - 83)  BP: 108/75 (09 Mar 2018 05:15) (108/75 - 124/79)  BP(mean): --  RR: 18 (09 Mar 2018 05:15) (18 - 18)  SpO2: 98% (09 Mar 2018 05:15) (97% - 98%)    Daily     Daily     I&O's Detail      CAPILLARY BLOOD GLUCOSE                        03-08    138  |  103  |  25<H>  ----------------------------<  104<H>  4.2   |  28  |  0.91    Ca    9.4      08 Mar 2018 07:17                        MEDICATIONS  (STANDING):  docusate sodium 100 milliGRAM(s) Oral three times a day  famotidine    Tablet 20 milliGRAM(s) Oral every 12 hours  fluticasone propionate 50 MICROgram(s)/spray Nasal Spray 2 Spray(s) Both Nostrils daily  heparin  Injectable 5000 Unit(s) SubCutaneous every 12 hours  polyethylene glycol 3350 17 Gram(s) Oral daily    MEDICATIONS  (PRN):  acetaminophen   Tablet 650 milliGRAM(s) Oral every 6 hours PRN For Temp greater than 38 C (100.4 F)  acetaminophen   Tablet. 650 milliGRAM(s) Oral every 6 hours PRN headache  bisacodyl Suppository 10 milliGRAM(s) Rectal daily PRN Constipation  bisacodyl Suppository 10 milliGRAM(s) Rectal daily PRN If no bowel movement by POD#2  diphenhydrAMINE   Capsule 25 milliGRAM(s) Oral at bedtime PRN Insomnia  HYDROmorphone   Tablet 2 milliGRAM(s) Oral every 3 hours PRN Moderate Pain (4 - 6)  HYDROmorphone  Injectable 0.5 milliGRAM(s) IV Push every 10 minutes PRN Severe Pain (7 - 10)  HYDROmorphone  Injectable 2 milliGRAM(s) IV Push every 3 hours PRN Severe Pain (7 - 10)  magnesium citrate Solution 300 milliLiter(s) Oral daily PRN Constipation  naloxone Injectable 0.1 milliGRAM(s) IV Push every 3 minutes PRN For ANY of the following changes in patient status:  A. RR LESS THAN 10 breaths per minute, B. Oxygen saturation LESS THAN 90%, C. Sedation score of 6  ondansetron Injectable 4 milliGRAM(s) IV Push every 6 hours PRN Nausea  ondansetron Injectable 4 milliGRAM(s) IV Push once PRN Nausea and/or Vomiting
Pt is still c/o LBP with radiation down his RLE.  S/p STONE with minimal response, still not able to get up or walk around.    Vital Signs Last 24 Hrs  T(C): 37.1 (07 Mar 2018 10:52), Max: 37.1 (07 Mar 2018 10:52)  T(F): 98.7 (07 Mar 2018 10:52), Max: 98.7 (07 Mar 2018 10:52)  HR: 84 (07 Mar 2018 10:52) (71 - 90)  BP: 124/70 (07 Mar 2018 10:52) (117/71 - 140/89  BP(mean): --  RR: 16 (07 Mar 2018 10:52) (16 - 18)  SpO2: 94% (07 Mar 2018 10:52) (94% - 100%)    Daily     Daily     I&O's Detail    06 Mar 2018 07:01  -  07 Mar 2018 07:00  --------------------------------------------------------  IN:    lactated ringers.: 75 mL    Other: 200 mL  Total IN: 275 mL    OUT:  Total OUT: 0 mL    Total NET: 275 mL          CAPILLARY BLOOD GLUCOSE                                                MEDICATIONS  (STANDING):  docusate sodium 100 milliGRAM(s) Oral three times a day  famotidine    Tablet 20 milliGRAM(s) Oral every 12 hours  fluticasone propionate 50 MICROgram(s)/spray Nasal Spray 2 Spray(s) Both Nostrils daily  polyethylene glycol 3350 17 Gram(s) Oral daily    MEDICATIONS  (PRN):  acetaminophen   Tablet 650 milliGRAM(s) Oral every 6 hours PRN For Temp greater than 38 C (100.4 F)  acetaminophen   Tablet. 650 milliGRAM(s) Oral every 6 hours PRN headache  bisacodyl Suppository 10 milliGRAM(s) Rectal daily PRN If no bowel movement by POD#2  diphenhydrAMINE   Capsule 25 milliGRAM(s) Oral at bedtime PRN Insomnia  HYDROmorphone  Injectable 0.5 milliGRAM(s) IV Push every 10 minutes PRN Severe Pain (7 - 10)  HYDROmorphone  Injectable 2 milliGRAM(s) IV Push every 3 hours PRN Severe Pain (7 - 10)  magnesium citrate Solution 300 milliLiter(s) Oral daily PRN Constipation  naloxone Injectable 0.1 milliGRAM(s) IV Push every 3 minutes PRN For ANY of the following changes in patient status:  A. RR LESS THAN 10 breaths per minute, B. Oxygen saturation LESS THAN 90%, C. Sedation score of 6  ondansetron Injectable 4 milliGRAM(s) IV Push every 6 hours PRN Nausea  ondansetron Injectable 4 milliGRAM(s) IV Push once PRN Nausea and/or Vomiting
Pt reports his pain is better after surgery, no new complaints.      Vital Signs Last 24 Hrs  T(C): 36.4 (14 Mar 2018 04:48), Max: 36.9 (13 Mar 2018 15:48)  T(F): 97.6 (14 Mar 2018 04:48), Max: 98.5 (13 Mar 2018 15:48)  HR: 61 (14 Mar 2018 04:48) (61 - 96)  BP: 103/60 (14 Mar 2018 04:48) (103/60 - 148/83)  BP(mean): --  RR: 12 (13 Mar 2018 17:53) (12 - 16)  SpO2: 100% (14 Mar 2018 04:48) (94% - 100%)    Daily     Daily     I&O's Detail    13 Mar 2018 07:01  -  14 Mar 2018 07:00  --------------------------------------------------------  IN:    Other: 800 mL  Total IN: 800 mL    OUT:    Voided: 650 mL  Total OUT: 650 mL    Total NET: 150 mL          CAPILLARY BLOOD GLUCOSE                                                MEDICATIONS  (STANDING):  ceFAZolin   IVPB 2000 milliGRAM(s) IV Intermittent every 8 hours  docusate sodium 100 milliGRAM(s) Oral three times a day  famotidine    Tablet 20 milliGRAM(s) Oral every 12 hours  fluticasone propionate 50 MICROgram(s)/spray Nasal Spray 2 Spray(s) Both Nostrils daily  lactated ringers. 1000 milliLiter(s) (75 mL/Hr) IV Continuous <Continuous>  polyethylene glycol 3350 17 Gram(s) Oral daily    MEDICATIONS  (PRN):  acetaminophen   Tablet 650 milliGRAM(s) Oral every 6 hours PRN For Temp greater than 38 C (100.4 F)  diphenhydrAMINE   Capsule 25 milliGRAM(s) Oral at bedtime PRN Insomnia  HYDROmorphone  Injectable 2 milliGRAM(s) IV Push every 3 hours PRN Severe Pain (7 - 10)
Pt states he is having increasing LBP worse today, not able to get out of bed.     Vital Signs Last 24 Hrs  T(C): 36.6 (11 Mar 2018 05:14), Max: 37.1 (10 Mar 2018 12:24)  T(F): 97.9 (11 Mar 2018 05:14), Max: 98.8 (10 Mar 2018 12:24)  HR: 71 (11 Mar 2018 05:14) (71 - 82)  BP: 124/82 (11 Mar 2018 05:14) (110/79 - 124/82)  BP(mean): --  RR: 19 (11 Mar 2018 05:14) (17 - 19)  SpO2: 97% (11 Mar 2018 05:14) (97% - 98%)    Daily     Daily     I&O's Detail      CAPILLARY BLOOD GLUCOSE                                                MEDICATIONS  (STANDING):  docusate sodium 100 milliGRAM(s) Oral three times a day  famotidine    Tablet 20 milliGRAM(s) Oral every 12 hours  fluticasone propionate 50 MICROgram(s)/spray Nasal Spray 2 Spray(s) Both Nostrils daily  heparin  Injectable 5000 Unit(s) SubCutaneous every 12 hours  polyethylene glycol 3350 17 Gram(s) Oral daily    MEDICATIONS  (PRN):  acetaminophen   Tablet 650 milliGRAM(s) Oral every 6 hours PRN For Temp greater than 38 C (100.4 F)  acetaminophen   Tablet. 650 milliGRAM(s) Oral every 6 hours PRN headache  bisacodyl Suppository 10 milliGRAM(s) Rectal daily PRN Constipation  bisacodyl Suppository 10 milliGRAM(s) Rectal daily PRN If no bowel movement by POD#2  diphenhydrAMINE   Capsule 25 milliGRAM(s) Oral at bedtime PRN Insomnia  HYDROmorphone   Tablet 2 milliGRAM(s) Oral every 3 hours PRN Moderate Pain (4 - 6)  HYDROmorphone  Injectable 0.5 milliGRAM(s) IV Push every 10 minutes PRN Severe Pain (7 - 10)  HYDROmorphone  Injectable 2 milliGRAM(s) IV Push every 3 hours PRN Severe Pain (7 - 10)  magnesium citrate Solution 300 milliLiter(s) Oral daily PRN Constipation  naloxone Injectable 0.1 milliGRAM(s) IV Push every 3 minutes PRN For ANY of the following changes in patient status:  A. RR LESS THAN 10 breaths per minute, B. Oxygen saturation LESS THAN 90%, C. Sedation score of 6  ondansetron Injectable 4 milliGRAM(s) IV Push every 6 hours PRN Nausea  ondansetron Injectable 4 milliGRAM(s) IV Push once PRN Nausea and/or Vomiting
Pt still c/o LBP otherwise uneventful night.  No new complaints.    Vital Signs Last 24 Hrs  T(C): 36.5 (13 Mar 2018 05:00), Max: 37 (12 Mar 2018 16:36)  T(F): 97.7 (13 Mar 2018 05:00), Max: 98.6 (12 Mar 2018 16:36)  HR: 94 (13 Mar 2018 05:00) (78 - 94)  BP: 132/77 (13 Mar 2018 05:00) (117/67 - 132/77)  BP(mean): --  RR: 18 (13 Mar 2018 05:00) (17 - 18)  SpO2: 99% (13 Mar 2018 05:00) (95% - 99%)    Daily     Daily     I&O's Detail      CAPILLARY BLOOD GLUCOSE                                                MEDICATIONS  (STANDING):  docusate sodium 100 milliGRAM(s) Oral three times a day  famotidine    Tablet 20 milliGRAM(s) Oral every 12 hours  fluticasone propionate 50 MICROgram(s)/spray Nasal Spray 2 Spray(s) Both Nostrils daily  polyethylene glycol 3350 17 Gram(s) Oral daily  sodium chloride 0.9%. 1000 milliLiter(s) (75 mL/Hr) IV Continuous <Continuous>    MEDICATIONS  (PRN):  acetaminophen   Tablet 650 milliGRAM(s) Oral every 6 hours PRN For Temp greater than 38 C (100.4 F)  acetaminophen   Tablet. 650 milliGRAM(s) Oral every 6 hours PRN headache  bisacodyl Suppository 10 milliGRAM(s) Rectal daily PRN Constipation  bisacodyl Suppository 10 milliGRAM(s) Rectal daily PRN If no bowel movement by POD#2  diphenhydrAMINE   Capsule 25 milliGRAM(s) Oral at bedtime PRN Insomnia  HYDROmorphone   Tablet 2 milliGRAM(s) Oral every 3 hours PRN Moderate Pain (4 - 6)  HYDROmorphone  Injectable 0.5 milliGRAM(s) IV Push every 10 minutes PRN Severe Pain (7 - 10)  HYDROmorphone  Injectable 2 milliGRAM(s) IV Push every 3 hours PRN Severe Pain (7 - 10)  magnesium citrate Solution 300 milliLiter(s) Oral daily PRN Constipation  naloxone Injectable 0.1 milliGRAM(s) IV Push every 3 minutes PRN For ANY of the following changes in patient status:  A. RR LESS THAN 10 breaths per minute, B. Oxygen saturation LESS THAN 90%, C. Sedation score of 6  ondansetron Injectable 4 milliGRAM(s) IV Push every 6 hours PRN Nausea  ondansetron Injectable 4 milliGRAM(s) IV Push once PRN Nausea and/or Vomiting
Pt still c/o LBP, wants to purse surgery.  No new complaints.      Vital Signs Last 24 Hrs  T(C): 36.9 (12 Mar 2018 04:39), Max: 37.1 (11 Mar 2018 11:42)  T(F): 98.5 (12 Mar 2018 04:39), Max: 98.8 (11 Mar 2018 11:42)  HR: 73 (12 Mar 2018 04:39) (73 - 95)  BP: 125/78 (12 Mar 2018 04:39) (114/70 - 139/84)  BP(mean): --  RR: 20 (12 Mar 2018 04:39) (17 - 20)  SpO2: 98% (12 Mar 2018 04:39) (98% - 99%)    Daily     Daily     I&O's Detail      CAPILLARY BLOOD GLUCOSE              EKG (3/4/18)-sinus tach with no acute changes.                                  MEDICATIONS  (STANDING):  docusate sodium 100 milliGRAM(s) Oral three times a day  famotidine    Tablet 20 milliGRAM(s) Oral every 12 hours  fluticasone propionate 50 MICROgram(s)/spray Nasal Spray 2 Spray(s) Both Nostrils daily  heparin  Injectable 5000 Unit(s) SubCutaneous every 12 hours  polyethylene glycol 3350 17 Gram(s) Oral daily  sodium chloride 0.9%. 1000 milliLiter(s) (75 mL/Hr) IV Continuous <Continuous>    MEDICATIONS  (PRN):  acetaminophen   Tablet 650 milliGRAM(s) Oral every 6 hours PRN For Temp greater than 38 C (100.4 F)  acetaminophen   Tablet. 650 milliGRAM(s) Oral every 6 hours PRN headache  bisacodyl Suppository 10 milliGRAM(s) Rectal daily PRN Constipation  bisacodyl Suppository 10 milliGRAM(s) Rectal daily PRN If no bowel movement by POD#2  diphenhydrAMINE   Capsule 25 milliGRAM(s) Oral at bedtime PRN Insomnia  HYDROmorphone   Tablet 2 milliGRAM(s) Oral every 3 hours PRN Moderate Pain (4 - 6)  HYDROmorphone  Injectable 0.5 milliGRAM(s) IV Push every 10 minutes PRN Severe Pain (7 - 10)  HYDROmorphone  Injectable 2 milliGRAM(s) IV Push every 3 hours PRN Severe Pain (7 - 10)  magnesium citrate Solution 300 milliLiter(s) Oral daily PRN Constipation  naloxone Injectable 0.1 milliGRAM(s) IV Push every 3 minutes PRN For ANY of the following changes in patient status:  A. RR LESS THAN 10 breaths per minute, B. Oxygen saturation LESS THAN 90%, C. Sedation score of 6  ondansetron Injectable 4 milliGRAM(s) IV Push every 6 hours PRN Nausea  ondansetron Injectable 4 milliGRAM(s) IV Push once PRN Nausea and/or Vomiting
Pt still c/o severe LBP with radiation down RLE despite being on PCA.  He is frustrated that he cannot even walk due to his pain.    Vital Signs Last 24 Hrs  T(C): 36.6 (06 Mar 2018 05:23), Max: 37.1 (05 Mar 2018 11:49)  T(F): 97.9 (06 Mar 2018 05:23), Max: 98.8 (05 Mar 2018 11:49)  HR: 83 (06 Mar 2018 05:23) (83 - 102)  BP: 139/92 (06 Mar 2018 05:23) (128/89 - 139/92)  BP(mean): --  RR: 18 (06 Mar 2018 05:23) (16 - 18)  SpO2: 97% (06 Mar 2018 05:23) (93% - 97%)    Daily     Daily     I&O's Detail      CAPILLARY BLOOD GLUCOSE                                                MEDICATIONS  (STANDING):  docusate sodium 100 milliGRAM(s) Oral three times a day  famotidine    Tablet 20 milliGRAM(s) Oral every 12 hours  fluticasone propionate 50 MICROgram(s)/spray Nasal Spray 2 Spray(s) Both Nostrils daily  HYDROmorphone PCA (1 mG/mL) 30 milliLiter(s) PCA Continuous PCA Continuous  sodium chloride 0.9%. 1000 milliLiter(s) (30 mL/Hr) IV Continuous <Continuous>    MEDICATIONS  (PRN):  acetaminophen   Tablet 650 milliGRAM(s) Oral every 6 hours PRN For Temp greater than 38 C (100.4 F)  acetaminophen   Tablet. 650 milliGRAM(s) Oral every 6 hours PRN headache  bisacodyl Suppository 10 milliGRAM(s) Rectal daily PRN If no bowel movement by POD#2  diphenhydrAMINE   Capsule 25 milliGRAM(s) Oral at bedtime PRN Insomnia  HYDROmorphone PCA (1 mG/mL) Rescue Clinician Bolus 0.5 milliGRAM(s) IV Push every 15 minutes PRN for Pain Scale GREATER THAN 6  naloxone Injectable 0.1 milliGRAM(s) IV Push every 3 minutes PRN For ANY of the following changes in patient status:  A. RR LESS THAN 10 breaths per minute, B. Oxygen saturation LESS THAN 90%, C. Sedation score of 6  ondansetron Injectable 4 milliGRAM(s) IV Push every 6 hours PRN Nausea
c/o severe left leg pain  leg pain persists  nv intact  + SLR Left at 20  + contralat SLR at 40    pt had upper endoscopy today for melena        Review of MRI reveals huge extr disc at L45 on left and a small disc HNP on Left at L5S1
c/o similar pain in rt leg as when he came into the hospital  when OOB after a few minutes, he has increasing pain in rt foot  + SLR rt at 30 deg  + contralat SLR left  TA Rt at 4+/5  rest wnl    MRI + HNP Rt L45,L5S1
had another bad night w rt leg pain  + SLR rt at 20 deg  neg SLR left at 45  neuro intact except for 5-/5 Rt TA    MRI + HNP rt L45,L5S1
pain continues into rt leg  unable to bear weight w/o pain  neuro  5-/5 Rt TA  rest wnl  + SLR rt at 30 deg  neg SLR left
pt refused MRI this am   said he wanted to try open mri but pain has gotten so bad   it is  terrible  intractable  right leg pain  can't move  oob standing  can't sit  can't get into bed because of pain    difficult to examine him adequately due to his inability to get into bed  reports weakness in Rt foot    will give stat dilaudid  consult anesthesia for PCA  low dose continuous and low dose demand
rt leg pain persists  afeb vss  nv intact except for rt leg pain w rt Tib ant weakness @ 4/5      MRI completed
sl better  but still w rt leg and calf pain  on IV decadron and PCA  sl weak rt foot df  ct scan results pending
sl less pain but when OOB wB on rt foot....  pain is intense  afeb vss  nv intact  rt foot Ta 5-/5 today  + SLR at 20 deg  left SLR neg at 45      had LESI yesterday    need to give it time to work    needs PT for OOB  walking
still w inability to WB rt lower ext  c/o severe constipation   afeb vss  nv intact     + SLR Rt at 30 deg  neg SLR left at 45    tender Rt buttock    walked 20-30 feet w pain on wb rt leg      mri + HNP rt L45,L5S1
still w intractable disabling rt leg pain and weakness    + slr rt @ 20  neg SLR Lt @45  weak TA rt at 4/5  rest wnl      HNP rt L45,Rt L5S1
still w very significant pain in rt foot on wb  cannot take many steps without pain    + SLR rt at 30 deg  + contralat SLR  motor  5-/5 Rt TA  rest wnl
Pt still c/o LBP with radiation down his RLE which is slowly improving.  Offers no new complaints.    Vital Signs Last 24 Hrs  T(C): 36.8 (10 Mar 2018 05:19), Max: 36.8 (10 Mar 2018 05:19)  T(F): 98.3 (10 Mar 2018 05:19), Max: 98.3 (10 Mar 2018 05:19)  HR: 78 (10 Mar 2018 05:19) (75 - 78)  BP: 119/71 (10 Mar 2018 05:19) (115/71 - 119/71)  BP(mean): --  RR: 18 (10 Mar 2018 05:19) (18 - 18)  SpO2: 99% (10 Mar 2018 05:19) (94% - 99%)    Daily     Daily     I&O's Detail      CAPILLARY BLOOD GLUCOSE                                                MEDICATIONS  (STANDING):  docusate sodium 100 milliGRAM(s) Oral three times a day  famotidine    Tablet 20 milliGRAM(s) Oral every 12 hours  fluticasone propionate 50 MICROgram(s)/spray Nasal Spray 2 Spray(s) Both Nostrils daily  heparin  Injectable 5000 Unit(s) SubCutaneous every 12 hours  polyethylene glycol 3350 17 Gram(s) Oral daily    MEDICATIONS  (PRN):  acetaminophen   Tablet 650 milliGRAM(s) Oral every 6 hours PRN For Temp greater than 38 C (100.4 F)  acetaminophen   Tablet. 650 milliGRAM(s) Oral every 6 hours PRN headache  bisacodyl Suppository 10 milliGRAM(s) Rectal daily PRN Constipation  bisacodyl Suppository 10 milliGRAM(s) Rectal daily PRN If no bowel movement by POD#2  diphenhydrAMINE   Capsule 25 milliGRAM(s) Oral at bedtime PRN Insomnia  HYDROmorphone   Tablet 2 milliGRAM(s) Oral every 3 hours PRN Moderate Pain (4 - 6)  HYDROmorphone  Injectable 0.5 milliGRAM(s) IV Push every 10 minutes PRN Severe Pain (7 - 10)  HYDROmorphone  Injectable 2 milliGRAM(s) IV Push every 3 hours PRN Severe Pain (7 - 10)  magnesium citrate Solution 300 milliLiter(s) Oral daily PRN Constipation  naloxone Injectable 0.1 milliGRAM(s) IV Push every 3 minutes PRN For ANY of the following changes in patient status:  A. RR LESS THAN 10 breaths per minute, B. Oxygen saturation LESS THAN 90%, C. Sedation score of 6  ondansetron Injectable 4 milliGRAM(s) IV Push every 6 hours PRN Nausea  ondansetron Injectable 4 milliGRAM(s) IV Push once PRN Nausea and/or Vomiting

## 2018-03-14 NOTE — PROGRESS NOTE ADULT - COMMENTS
All other ROS negative

## 2018-03-14 NOTE — PROGRESS NOTE ADULT - PSYCHIATRIC DETAILS
normal affect/normal behavior
normal behavior/normal affect
normal affect/normal behavior
normal behavior/normal affect
normal behavior/normal affect
normal affect/normal behavior
normal affect/normal behavior

## 2018-03-14 NOTE — PROGRESS NOTE ADULT - GASTROINTESTINAL DETAILS
nontender/no distention/bowel sounds normal/soft
bowel sounds normal/soft/nontender/no distention
nontender/bowel sounds normal/no distention/soft
nontender/soft/bowel sounds normal/no distention
soft/bowel sounds normal/no distention/nontender
soft/no distention/bowel sounds normal/nontender
no distention/soft/nontender/bowel sounds normal
soft/nontender/bowel sounds normal/no distention
soft/nontender/no distention/bowel sounds normal

## 2018-03-14 NOTE — DISCHARGE NOTE ADULT - HOSPITAL COURSE
admit  MRI  IV steroids  no response  LESI  no response  surgery.........better  dc home admit  MRI  IV steroids  no response  LESI  no response  tried conservative care  failed   then   surgery.........better  [facility]

## 2018-03-14 NOTE — PHYSICAL THERAPY INITIAL EVALUATION ADULT - GENERAL OBSERVATIONS, REHAB EVAL
IV disconnected by RN Gio; pt rec'd in bed supine; denied pain @ rest
pt received supine in bed on 5E. pt cooperative with PT, with c/o LBP that goes down the R leg to the calf.

## 2018-03-14 NOTE — PROGRESS NOTE ADULT - PROVIDER SPECIALTY LIST ADULT
Internal Medicine
Orthopedics
Internal Medicine

## 2018-03-14 NOTE — PROGRESS NOTE ADULT - CARDIOVASCULAR DETAILS
positive S1/positive S2
positive S2/positive S1
positive S2/positive S1
positive S1/positive S2
positive S1/positive S2
positive S2/positive S1

## 2018-03-15 LAB — SURGICAL PATHOLOGY FINAL REPORT - CH: SIGNIFICANT CHANGE UP

## 2018-03-16 DIAGNOSIS — M21.371 FOOT DROP, RIGHT FOOT: ICD-10-CM

## 2018-03-16 DIAGNOSIS — M51.16 INTERVERTEBRAL DISC DISORDERS WITH RADICULOPATHY, LUMBAR REGION: ICD-10-CM

## 2018-03-16 DIAGNOSIS — M96.1 POSTLAMINECTOMY SYNDROME, NOT ELSEWHERE CLASSIFIED: ICD-10-CM

## 2018-03-16 DIAGNOSIS — K59.00 CONSTIPATION, UNSPECIFIED: ICD-10-CM

## 2018-03-16 DIAGNOSIS — M51.17 INTERVERTEBRAL DISC DISORDERS WITH RADICULOPATHY, LUMBOSACRAL REGION: ICD-10-CM

## 2018-03-17 ENCOUNTER — INPATIENT (INPATIENT)
Facility: HOSPITAL | Age: 53
LOS: 13 days | Discharge: NURSING, REHAB OR LONG-TERM CARE | End: 2018-03-31
Attending: ORTHOPAEDIC SURGERY | Admitting: ORTHOPAEDIC SURGERY
Payer: COMMERCIAL

## 2018-03-17 VITALS — WEIGHT: 186.07 LBS | HEIGHT: 69 IN

## 2018-03-17 LAB
ANION GAP SERPL CALC-SCNC: 7 MMOL/L — SIGNIFICANT CHANGE UP (ref 5–17)
BASOPHILS # BLD AUTO: 0.04 K/UL — SIGNIFICANT CHANGE UP (ref 0–0.2)
BASOPHILS NFR BLD AUTO: 0.6 % — SIGNIFICANT CHANGE UP (ref 0–2)
BUN SERPL-MCNC: 21 MG/DL — SIGNIFICANT CHANGE UP (ref 7–23)
CALCIUM SERPL-MCNC: 9.3 MG/DL — SIGNIFICANT CHANGE UP (ref 8.5–10.1)
CHLORIDE SERPL-SCNC: 106 MMOL/L — SIGNIFICANT CHANGE UP (ref 96–108)
CO2 SERPL-SCNC: 27 MMOL/L — SIGNIFICANT CHANGE UP (ref 22–31)
CREAT SERPL-MCNC: 0.87 MG/DL — SIGNIFICANT CHANGE UP (ref 0.5–1.3)
EOSINOPHIL # BLD AUTO: 0.09 K/UL — SIGNIFICANT CHANGE UP (ref 0–0.5)
EOSINOPHIL NFR BLD AUTO: 1.4 % — SIGNIFICANT CHANGE UP (ref 0–6)
GLUCOSE SERPL-MCNC: 107 MG/DL — HIGH (ref 70–99)
HCT VFR BLD CALC: 46 % — SIGNIFICANT CHANGE UP (ref 39–50)
HGB BLD-MCNC: 16 G/DL — SIGNIFICANT CHANGE UP (ref 13–17)
IMM GRANULOCYTES NFR BLD AUTO: 0.2 % — SIGNIFICANT CHANGE UP (ref 0–1.5)
LYMPHOCYTES # BLD AUTO: 1.46 K/UL — SIGNIFICANT CHANGE UP (ref 1–3.3)
LYMPHOCYTES # BLD AUTO: 22.8 % — SIGNIFICANT CHANGE UP (ref 13–44)
MCHC RBC-ENTMCNC: 30.6 PG — SIGNIFICANT CHANGE UP (ref 27–34)
MCHC RBC-ENTMCNC: 34.8 GM/DL — SIGNIFICANT CHANGE UP (ref 32–36)
MCV RBC AUTO: 88 FL — SIGNIFICANT CHANGE UP (ref 80–100)
MONOCYTES # BLD AUTO: 0.6 K/UL — SIGNIFICANT CHANGE UP (ref 0–0.9)
MONOCYTES NFR BLD AUTO: 9.4 % — SIGNIFICANT CHANGE UP (ref 2–14)
NEUTROPHILS # BLD AUTO: 4.2 K/UL — SIGNIFICANT CHANGE UP (ref 1.8–7.4)
NEUTROPHILS NFR BLD AUTO: 65.6 % — SIGNIFICANT CHANGE UP (ref 43–77)
NRBC # BLD: 0 /100 WBCS — SIGNIFICANT CHANGE UP (ref 0–0)
PLATELET # BLD AUTO: 260 K/UL — SIGNIFICANT CHANGE UP (ref 150–400)
POTASSIUM SERPL-MCNC: 4.1 MMOL/L — SIGNIFICANT CHANGE UP (ref 3.5–5.3)
POTASSIUM SERPL-SCNC: 4.1 MMOL/L — SIGNIFICANT CHANGE UP (ref 3.5–5.3)
RBC # BLD: 5.23 M/UL — SIGNIFICANT CHANGE UP (ref 4.2–5.8)
RBC # FLD: 12.4 % — SIGNIFICANT CHANGE UP (ref 10.3–14.5)
SODIUM SERPL-SCNC: 140 MMOL/L — SIGNIFICANT CHANGE UP (ref 135–145)
WBC # BLD: 6.4 K/UL — SIGNIFICANT CHANGE UP (ref 3.8–10.5)
WBC # FLD AUTO: 6.4 K/UL — SIGNIFICANT CHANGE UP (ref 3.8–10.5)

## 2018-03-17 PROCEDURE — 99285 EMERGENCY DEPT VISIT HI MDM: CPT

## 2018-03-17 RX ORDER — MORPHINE SULFATE 50 MG/1
4 CAPSULE, EXTENDED RELEASE ORAL
Qty: 0 | Refills: 0 | Status: DISCONTINUED | OUTPATIENT
Start: 2018-03-17 | End: 2018-03-21

## 2018-03-17 RX ORDER — DOCUSATE SODIUM 100 MG
100 CAPSULE ORAL THREE TIMES A DAY
Qty: 0 | Refills: 0 | Status: DISCONTINUED | OUTPATIENT
Start: 2018-03-17 | End: 2018-03-27

## 2018-03-17 RX ORDER — KETOROLAC TROMETHAMINE 30 MG/ML
30 SYRINGE (ML) INJECTION ONCE
Qty: 0 | Refills: 0 | Status: DISCONTINUED | OUTPATIENT
Start: 2018-03-17 | End: 2018-03-17

## 2018-03-17 RX ORDER — DEXAMETHASONE 0.5 MG/5ML
6 ELIXIR ORAL ONCE
Qty: 0 | Refills: 0 | Status: COMPLETED | OUTPATIENT
Start: 2018-03-17 | End: 2018-03-17

## 2018-03-17 RX ORDER — ONDANSETRON 8 MG/1
4 TABLET, FILM COATED ORAL ONCE
Qty: 0 | Refills: 0 | Status: COMPLETED | OUTPATIENT
Start: 2018-03-17 | End: 2018-03-17

## 2018-03-17 RX ORDER — DIPHENHYDRAMINE HCL 50 MG
12.5 CAPSULE ORAL EVERY 4 HOURS
Qty: 0 | Refills: 0 | Status: DISCONTINUED | OUTPATIENT
Start: 2018-03-17 | End: 2018-03-27

## 2018-03-17 RX ORDER — ACETAMINOPHEN 500 MG
650 TABLET ORAL EVERY 6 HOURS
Qty: 0 | Refills: 0 | Status: DISCONTINUED | OUTPATIENT
Start: 2018-03-17 | End: 2018-03-27

## 2018-03-17 RX ORDER — SENNA PLUS 8.6 MG/1
2 TABLET ORAL AT BEDTIME
Qty: 0 | Refills: 0 | Status: DISCONTINUED | OUTPATIENT
Start: 2018-03-17 | End: 2018-03-27

## 2018-03-17 RX ORDER — MAGNESIUM HYDROXIDE 400 MG/1
30 TABLET, CHEWABLE ORAL EVERY 12 HOURS
Qty: 0 | Refills: 0 | Status: DISCONTINUED | OUTPATIENT
Start: 2018-03-17 | End: 2018-03-27

## 2018-03-17 RX ORDER — POLYETHYLENE GLYCOL 3350 17 G/17G
17 POWDER, FOR SOLUTION ORAL DAILY
Qty: 0 | Refills: 0 | Status: DISCONTINUED | OUTPATIENT
Start: 2018-03-17 | End: 2018-03-27

## 2018-03-17 RX ORDER — DEXAMETHASONE 0.5 MG/5ML
4 ELIXIR ORAL EVERY 6 HOURS
Qty: 0 | Refills: 0 | Status: COMPLETED | OUTPATIENT
Start: 2018-03-17 | End: 2018-03-18

## 2018-03-17 RX ORDER — MORPHINE SULFATE 50 MG/1
4 CAPSULE, EXTENDED RELEASE ORAL
Qty: 0 | Refills: 0 | Status: DISCONTINUED | OUTPATIENT
Start: 2018-03-17 | End: 2018-03-17

## 2018-03-17 RX ORDER — OXYCODONE AND ACETAMINOPHEN 5; 325 MG/1; MG/1
2 TABLET ORAL EVERY 4 HOURS
Qty: 0 | Refills: 0 | Status: DISCONTINUED | OUTPATIENT
Start: 2018-03-17 | End: 2018-03-17

## 2018-03-17 RX ORDER — MULTIVIT WITH MIN/MFOLATE/K2 340-15/3 G
300 POWDER (GRAM) ORAL ONCE
Qty: 0 | Refills: 0 | Status: COMPLETED | OUTPATIENT
Start: 2018-03-17 | End: 2018-03-17

## 2018-03-17 RX ORDER — HYDROMORPHONE HYDROCHLORIDE 2 MG/ML
1 INJECTION INTRAMUSCULAR; INTRAVENOUS; SUBCUTANEOUS ONCE
Qty: 0 | Refills: 0 | Status: DISCONTINUED | OUTPATIENT
Start: 2018-03-17 | End: 2018-03-17

## 2018-03-17 RX ORDER — SODIUM CHLORIDE 9 MG/ML
1000 INJECTION INTRAMUSCULAR; INTRAVENOUS; SUBCUTANEOUS ONCE
Qty: 0 | Refills: 0 | Status: COMPLETED | OUTPATIENT
Start: 2018-03-17 | End: 2018-03-17

## 2018-03-17 RX ORDER — CYCLOBENZAPRINE HYDROCHLORIDE 10 MG/1
10 TABLET, FILM COATED ORAL EVERY 8 HOURS
Qty: 0 | Refills: 0 | Status: DISCONTINUED | OUTPATIENT
Start: 2018-03-17 | End: 2018-03-27

## 2018-03-17 RX ADMIN — CYCLOBENZAPRINE HYDROCHLORIDE 10 MILLIGRAM(S): 10 TABLET, FILM COATED ORAL at 21:08

## 2018-03-17 RX ADMIN — Medication 6 MILLIGRAM(S): at 16:26

## 2018-03-17 RX ADMIN — MORPHINE SULFATE 4 MILLIGRAM(S): 50 CAPSULE, EXTENDED RELEASE ORAL at 23:12

## 2018-03-17 RX ADMIN — Medication 300 MILLILITER(S): at 15:02

## 2018-03-17 RX ADMIN — Medication 30 MILLIGRAM(S): at 16:27

## 2018-03-17 RX ADMIN — ONDANSETRON 4 MILLIGRAM(S): 8 TABLET, FILM COATED ORAL at 13:36

## 2018-03-17 RX ADMIN — HYDROMORPHONE HYDROCHLORIDE 1 MILLIGRAM(S): 2 INJECTION INTRAMUSCULAR; INTRAVENOUS; SUBCUTANEOUS at 13:37

## 2018-03-17 RX ADMIN — Medication 100 MILLIGRAM(S): at 21:08

## 2018-03-17 RX ADMIN — Medication 4 MILLIGRAM(S): at 23:11

## 2018-03-17 RX ADMIN — SODIUM CHLORIDE 1000 MILLILITER(S): 9 INJECTION INTRAMUSCULAR; INTRAVENOUS; SUBCUTANEOUS at 13:20

## 2018-03-17 NOTE — ED PROVIDER NOTE - SKIN, MLM
Surgical site dressed, no discharge. Skin normal color for race, warm, dry and otherwise intact. No evidence of rash.

## 2018-03-17 NOTE — CONSULT NOTE ADULT - SUBJECTIVE AND OBJECTIVE BOX
CHIEF COMPLAINT: Back pain, constipation    HPI: Patient underwent lamintomy, excision HNP L4-5 and L5-S1 on 3/13/18. He did well following surgery and was discharged home. While at home he developed increased LBP and well as constipation. He subsequently came to ER. Denies any weakness.    PAST MEDICAL & SURGICAL HISTORY:  No pertinent past medical history  No significant past surgical history      FAMILY HISTORY: Non-contributory      SOCIAL HISTORY: Non smoker, denies use of alcohol or drug products    REVIEW OF SYSTEMS:    CONSTITUTIONAL: No fever, weight loss, or fatigue  HEENT: Normal extraoccular movements,   NECK: See HPI  RESPIRATORY: No cough, wheezing, chills or hemoptysis; No shortness of breath  CARDIOVASCULAR: No chest pain, palpitations, dizziness, or leg swelling  GASTROINTESTINAL: No abdominal or epigastric pain. No nausea, vomiting, or hematemesis; No diarrhea or constipation. No melena or hematochezia.  GENITOURINARY: No dysuria, frequency, hematuria, or incontinence  NEUROLOGICAL: See HPI  SKIN: No itching, burning, rashes, or lesions   LYMPH NODES: No enlarged glands  ENDOCRINE: No heat or cold intolerance; No hair loss  MUSCULOSKELETAL: See HPI  PSYCHIATRIC: No depression, anxiety, mood swings, or difficulty sleeping  HEME/LYMPH: No easy bruising, or bleeding gums      Vital Signs Last 24 Hrs  T(C): 37.1 (17 Mar 2018 12:16), Max: 37.1 (17 Mar 2018 12:16)  T(F): 98.7 (17 Mar 2018 12:16), Max: 98.7 (17 Mar 2018 12:16)  HR: 91 (17 Mar 2018 12:25) (91 - 92)  BP: 125/88 (17 Mar 2018 12:25) (125/88 - 128/94)  BP(mean): --  RR: 16 (17 Mar 2018 12:25) (16 - 18)  SpO2: 100% (17 Mar 2018 12:25) (100% - 100%)  I&O's Detail      LABS:                        16.0   6.40  )-----------( 260      ( 17 Mar 2018 13:09 )             46.0     03-17    140  |  106  |  21  ----------------------------<  107<H>  4.1   |  27  |  0.87    Ca    9.3      17 Mar 2018 13:09      PHYSICAL EXAM:  General; Awake and alert, Oriented x 3  HEENT: Unremarkable  Respiratory: CTA bilaterally  Cardiovascular: S1 and S2, RRR  Gastrointestinal: BS+, soft, NT/ND  Vascular: 2+ peripheral pulses  Skin: No rashes  Neck: supple, NT, good ROM  Back: Incision - well healed. Nontender, mild swelling, no drainage  Extremities:              Sensation:  intact to light touch           Motor exam:          Bilateral Upper extremities    Delt      Bicep      Tri      Wrist ext  Wrst Flex       Digit Ext Digit Flex                                        R         5/5        5/5        5/5       5/5            5/5            5/5       5/5          5/5                                       L          5/5        5/5        5/5       5/5            5/5            5/5       5/5          5/5         Bilateral Lower ext.     Hip Flx  Hip Ext    Quad   Hamstrg   TA       EHL      GS                                R        5/5        5/5        5/5        5/5          5/5     5/5      5/5                                L         5/5        5/5        5/5        5/5          5/5     5/5      5/5           Tension Signs: (-)SLR bilaterally           A/P :  s/p lami/dicectomy with increased back pain, constipation    Plan:  - To be admitted  - Continue pain management  - Bowel regimen for constipation  - OOB/PT as tolerated

## 2018-03-17 NOTE — ED ADULT NURSE NOTE - OBJECTIVE STATEMENT
Pt presents s/p surgical procedure for sciatica on Tuesday. Discharged on Wednesday. Pt c/o pain in lower back radiating to right leg with numbness. Pt reports he has not had a bowel movement since last Monday. (two weeks ago) Pt took motrin 800mg at 11:15am today with no relief.

## 2018-03-17 NOTE — ED PROVIDER NOTE - NEUROLOGICAL, MLM
Strength 5/5 in LE bilaterally. Slightly decreased sensation RLE. Alert and oriented, no focal deficits, no motor deficits.

## 2018-03-17 NOTE — ED PROVIDER NOTE - OBJECTIVE STATEMENT
54 y/o Male s/p back surgery(L4-L5, L5-S1) performed by Dr. Duke and Dr. Sher 4 days presents to ED c/o back pain. Pt states he has been trying to relieve pain with prescribed medications (percocet and gabapentin), but is seeing little to no relief. Pt also reports of constipation, last BM 1 week ago. +Dehydration. Pt adds he also feeling increased numbness/tingling in his LE over the last day. No abd pain, no fever.

## 2018-03-17 NOTE — ED PROVIDER NOTE - PROGRESS NOTE DETAILS
Spoke to Dr. Wyman who said to advise pt to try to improve bowel regimen, continue to take pain medication, follow up in office

## 2018-03-17 NOTE — ED ADULT NURSE REASSESSMENT NOTE - NS ED NURSE REASSESS COMMENT FT1
rounded on pt, pt lying prone in bed for comfort. No acute distress noted at this time. Pt is resting comfortably, sleeping but easily aroused with verbal commands. All safety maintained, IV access obtained, POC discussed with pt, pt verbalized understanding.

## 2018-03-17 NOTE — ED PROVIDER NOTE - MEDICAL DECISION MAKING DETAILS
Pain control, enema for constipation Patient unable to stand and ambulated in ED.  Pt needs admission for continued pain control and bowel regimen.

## 2018-03-17 NOTE — ED PROVIDER NOTE - CONSTITUTIONAL, MLM
normal... Well appearing, well nourished, awake, alert, oriented to person, place, time/situation and in obvious pain.

## 2018-03-18 RX ORDER — DEXAMETHASONE 0.5 MG/5ML
4 ELIXIR ORAL EVERY 6 HOURS
Qty: 0 | Refills: 0 | Status: COMPLETED | OUTPATIENT
Start: 2018-03-18 | End: 2018-03-19

## 2018-03-18 RX ADMIN — CYCLOBENZAPRINE HYDROCHLORIDE 10 MILLIGRAM(S): 10 TABLET, FILM COATED ORAL at 05:13

## 2018-03-18 RX ADMIN — Medication 100 MILLIGRAM(S): at 14:30

## 2018-03-18 RX ADMIN — CYCLOBENZAPRINE HYDROCHLORIDE 10 MILLIGRAM(S): 10 TABLET, FILM COATED ORAL at 21:41

## 2018-03-18 RX ADMIN — Medication 4 MILLIGRAM(S): at 23:30

## 2018-03-18 RX ADMIN — Medication 4 MILLIGRAM(S): at 12:07

## 2018-03-18 RX ADMIN — MORPHINE SULFATE 4 MILLIGRAM(S): 50 CAPSULE, EXTENDED RELEASE ORAL at 22:45

## 2018-03-18 RX ADMIN — MORPHINE SULFATE 4 MILLIGRAM(S): 50 CAPSULE, EXTENDED RELEASE ORAL at 22:32

## 2018-03-18 RX ADMIN — Medication 4 MILLIGRAM(S): at 05:13

## 2018-03-18 RX ADMIN — CYCLOBENZAPRINE HYDROCHLORIDE 10 MILLIGRAM(S): 10 TABLET, FILM COATED ORAL at 14:30

## 2018-03-18 RX ADMIN — Medication 4 MILLIGRAM(S): at 17:17

## 2018-03-18 RX ADMIN — Medication 100 MILLIGRAM(S): at 05:13

## 2018-03-18 RX ADMIN — MORPHINE SULFATE 4 MILLIGRAM(S): 50 CAPSULE, EXTENDED RELEASE ORAL at 06:22

## 2018-03-18 NOTE — CONSULT NOTE ADULT - SUBJECTIVE AND OBJECTIVE BOX
HPI: 52 y/o male with recent lumbar lami/fusion,  excision HNP L4-5 and L5-S1 on 3/13/18. He did well following surgery and was discharged home. While at home he developed increased LBP and well as constipation and RLE sharp, shooting pain.  Patient states his gabapentin was increased as an outpt but still having pain bringing him to the er for further eval.  Pt was admitted tot he Spine service for pain control and medicine consult requested for medical management.    PAST MEDICAL & SURGICAL HISTORY:  No pertinent past medical history      FAMILY HISTORY:   non-contributory to the patient's current presentation      SOCIAL HISTORY:  no smoking, occas alcohol, no drugs    REVIEW OF SYSTEMS:   All 10 systems reviewed in detailed and found to be negative with the exception of what has already been described above    MEDICATIONS  (STANDING):  cyclobenzaprine 10 milliGRAM(s) Oral every 8 hours  dexamethasone  Injectable 4 milliGRAM(s) IV Push every 6 hours  docusate sodium 100 milliGRAM(s) Oral three times a day  polyethylene glycol 3350 17 Gram(s) Oral daily  senna 2 Tablet(s) Oral at bedtime    MEDICATIONS  (PRN):  acetaminophen   Tablet 650 milliGRAM(s) Oral every 6 hours PRN For Temp greater than 38.5 C (101.3 F)  acetaminophen   Tablet. 650 milliGRAM(s) Oral every 6 hours PRN Mild Pain (1 - 3)  diphenhydrAMINE   Injectable 12.5 milliGRAM(s) IV Push every 4 hours PRN Itching  magnesium hydroxide Suspension 30 milliLiter(s) Oral every 12 hours PRN Constipation  morphine  - Injectable 4 milliGRAM(s) IV Push every 3 hours PRN Moderate Pain (4 - 6)  oxyCODONE    5 mG/acetaminophen 325 mG 2 Tablet(s) Oral every 4 hours PRN Moderate Pain      Allergies    No Known Allergies    Intolerances          PHYSICAL EXAM:    Vital Signs Last 24 Hrs  T(C): 36.8 (18 Mar 2018 05:15), Max: 37.1 (17 Mar 2018 12:16)  T(F): 98.2 (18 Mar 2018 05:15), Max: 98.7 (17 Mar 2018 12:16)  HR: 85 (18 Mar 2018 05:15) (85 - 92)  BP: 100/66 (18 Mar 2018 05:15) (100/66 - 139/78)  BP(mean): --  RR: 16 (18 Mar 2018 05:15) (16 - 18)  SpO2: 98% (18 Mar 2018 05:15) (98% - 100%)    GEN: A and O, NAD, mood stable  HEENT:   NC/AT, EOMI, no oropharyngeal lesions    NECK:   supple    CV:  +S1, +S2, regular, no murmurs or rubs    RESP:   lungs clear to auscultation bilaterally, no wheezing, rales, rhonchi, good air entry bilaterally    GI:  abdomen soft, non-tender, non-distended, normal BS,  no abdominal masses, no palpable masses    BACK: LOWER BACK INCISION C/D/I, NO SURROUNDING ERYTHEMA OR EDEMA    RECTAL:  not examined    :  not examined    MSK:   normal muscle tone, no atrophy, no rigidity, no contractions    EXT:   no clubbing, no cyanosis, no edema, no calf pain, swelling or erythema, MOVING ALL EXTREMITIES    VASCULAR:  pulses equal and symmetric in the upper and lower extremities    NEURO:  AAOX3, no focal neurological deficits, follows all commands, able to move extremities spontaneously    SKIN:  no ulcers, lesions or rashes    LABS/IMAGIN.0   6.40  )-----------( 260      ( 17 Mar 2018 13:09 )             46.0     03-17    140  |  106  |  21  ----------------------------<  107<H>  4.1   |  27  |  0.87    Ca    9.3      17 Mar 2018 13:09

## 2018-03-18 NOTE — CONSULT NOTE ADULT - ASSESSMENT
54 Y/O MALE WITH THE ABOVE MED HX READMITTED AFTER RECENT LUMBAR LAMI/FUSION WITH INTRACTABLE BACK PAIN, RLE PAIN AND CONSTIPATION.    *LBP/RLE PAIN - HAS GOOD STRENGTH, POSSIBLY INFLAMMATION POST OP?  NO FEVERS OR CHILLS TO SUGGEST INFECTION  SURGICAL SITE APPEARS CLEAN  ON STEROID TRIAL AS PER SPINE, ADD ULCER PROPHY    * CONSTIPATION - FROM PAIN MEDS  CONT CURRENT BOWEL REGIMEN, WILL ADD MG CITRATE IF NEEDED

## 2018-03-18 NOTE — PROGRESS NOTE ADULT - SUBJECTIVE AND OBJECTIVE BOX
Chief Complaint: Back pain    History: Patient feeling better this morning. Less pain. Still no BM yet.    OBJECTIVE:     Vital Signs Last 24 Hrs  T(C): 36.8 (18 Mar 2018 05:15), Max: 37.1 (17 Mar 2018 12:16)  T(F): 98.2 (18 Mar 2018 05:15), Max: 98.7 (17 Mar 2018 12:16)  HR: 85 (18 Mar 2018 05:15) (85 - 92)  BP: 100/66 (18 Mar 2018 05:15) (100/66 - 139/78)  BP(mean): --  RR: 16 (18 Mar 2018 05:15) (16 - 18)  SpO2: 98% (18 Mar 2018 05:15) (98% - 100%)    Physical Exam:         Awake and alert         HEENT - unremarkable         Neck - supple         Back: Inc - C/D/I         Bilateral Upper Extremities:             Good Motor Strength             Sensation intact         Bilateral Lower Extremities             Good Motor Strength             Senation intact             I&O's Detail      LABS:                        16.0   6.40  )-----------( 260      ( 17 Mar 2018 13:09 )             46.0     03-17    140  |  106  |  21  ----------------------------<  107<H>  4.1   |  27  |  0.87    Ca    9.3      17 Mar 2018 13:09    A/P :  53y Male s/p lami/discectomy  -    Continue pain management  -    OOB/PT  -    Bowel regimen  -    Continue present treatment

## 2018-03-19 RX ORDER — DEXAMETHASONE 0.5 MG/5ML
2 ELIXIR ORAL EVERY 6 HOURS
Qty: 0 | Refills: 0 | Status: DISCONTINUED | OUTPATIENT
Start: 2018-03-19 | End: 2018-03-24

## 2018-03-19 RX ORDER — HEPARIN SODIUM 5000 [USP'U]/ML
5000 INJECTION INTRAVENOUS; SUBCUTANEOUS EVERY 12 HOURS
Qty: 0 | Refills: 0 | Status: COMPLETED | OUTPATIENT
Start: 2018-03-19 | End: 2018-03-26

## 2018-03-19 RX ADMIN — Medication 4 MILLIGRAM(S): at 11:42

## 2018-03-19 RX ADMIN — Medication 2 MILLIGRAM(S): at 23:38

## 2018-03-19 RX ADMIN — Medication 2 MILLIGRAM(S): at 17:35

## 2018-03-19 RX ADMIN — HEPARIN SODIUM 5000 UNIT(S): 5000 INJECTION INTRAVENOUS; SUBCUTANEOUS at 17:36

## 2018-03-19 RX ADMIN — CYCLOBENZAPRINE HYDROCHLORIDE 10 MILLIGRAM(S): 10 TABLET, FILM COATED ORAL at 22:09

## 2018-03-19 RX ADMIN — Medication 100 MILLIGRAM(S): at 22:09

## 2018-03-19 RX ADMIN — Medication 100 MILLIGRAM(S): at 14:21

## 2018-03-19 RX ADMIN — CYCLOBENZAPRINE HYDROCHLORIDE 10 MILLIGRAM(S): 10 TABLET, FILM COATED ORAL at 14:21

## 2018-03-19 RX ADMIN — Medication 4 MILLIGRAM(S): at 05:33

## 2018-03-19 RX ADMIN — CYCLOBENZAPRINE HYDROCHLORIDE 10 MILLIGRAM(S): 10 TABLET, FILM COATED ORAL at 05:33

## 2018-03-19 RX ADMIN — MORPHINE SULFATE 4 MILLIGRAM(S): 50 CAPSULE, EXTENDED RELEASE ORAL at 22:25

## 2018-03-19 RX ADMIN — MORPHINE SULFATE 4 MILLIGRAM(S): 50 CAPSULE, EXTENDED RELEASE ORAL at 22:09

## 2018-03-19 NOTE — PROGRESS NOTE ADULT - ASSESSMENT
A/P :  53y Male s/p lami/discectomy  -    Continue pain management-taper steroids to 2 mg q6hr  -    OOB/PT  -    Bowel regimen  -    if pain persists after steroid taper, consider repeat MRI with gado

## 2018-03-19 NOTE — PROGRESS NOTE ADULT - SUBJECTIVE AND OBJECTIVE BOX
Pt seen and examined, reports LBP better today, had a BM last night, no new complaints.    Vital Signs Last 24 Hrs  T(C): 37.1 (18 Mar 2018 23:26), Max: 37.1 (18 Mar 2018 23:26)  T(F): 98.8 (18 Mar 2018 23:26), Max: 98.8 (18 Mar 2018 23:26)  HR: 79 (18 Mar 2018 23:26) (79 - 94)  BP: 93/59 (18 Mar 2018 23:26) (93/59 - 111/68)  BP(mean): --  RR: 16 (18 Mar 2018 23:26) (16 - 18)  SpO2: 98% (18 Mar 2018 23:26) (95% - 98%)    Daily     Daily     I&O's Detail      CAPILLARY BLOOD GLUCOSE                                          16.0   6.40  )-----------( 260      ( 17 Mar 2018 13:09 )             46.0       03-17    140  |  106  |  21  ----------------------------<  107<H>  4.1   |  27  |  0.87    Ca    9.3      17 Mar 2018 13:09                        MEDICATIONS  (STANDING):  cyclobenzaprine 10 milliGRAM(s) Oral every 8 hours  dexamethasone  Injectable 4 milliGRAM(s) IV Push every 6 hours  docusate sodium 100 milliGRAM(s) Oral three times a day  polyethylene glycol 3350 17 Gram(s) Oral daily  senna 2 Tablet(s) Oral at bedtime    MEDICATIONS  (PRN):  acetaminophen   Tablet 650 milliGRAM(s) Oral every 6 hours PRN For Temp greater than 38.5 C (101.3 F)  acetaminophen   Tablet. 650 milliGRAM(s) Oral every 6 hours PRN Mild Pain (1 - 3)  diphenhydrAMINE   Injectable 12.5 milliGRAM(s) IV Push every 4 hours PRN Itching  magnesium hydroxide Suspension 30 milliLiter(s) Oral every 12 hours PRN Constipation  morphine  - Injectable 4 milliGRAM(s) IV Push every 3 hours PRN Moderate Pain (4 - 6)  oxyCODONE    5 mG/acetaminophen 325 mG 2 Tablet(s) Oral every 4 hours PRN Moderate Pain

## 2018-03-19 NOTE — PROGRESS NOTE ADULT - SUBJECTIVE AND OBJECTIVE BOX
Chief Complaint: R leg pain    HPI:  Patient with persistent complaints of R leg pain similar to preop. Tried PT/OOB yesterday without success. (+) BM IV steroids continue.    MEDICATIONS  (STANDING):  cyclobenzaprine 10 milliGRAM(s) Oral every 8 hours  dexamethasone  Injectable 4 milliGRAM(s) IV Push every 6 hours  dexamethasone  Injectable 2 milliGRAM(s) IV Push every 6 hours  docusate sodium 100 milliGRAM(s) Oral three times a day  heparin  Injectable 5000 Unit(s) SubCutaneous every 12 hours  polyethylene glycol 3350 17 Gram(s) Oral daily  senna 2 Tablet(s) Oral at bedtime    MEDICATIONS  (PRN):  acetaminophen   Tablet 650 milliGRAM(s) Oral every 6 hours PRN For Temp greater than 38.5 C (101.3 F)  acetaminophen   Tablet. 650 milliGRAM(s) Oral every 6 hours PRN Mild Pain (1 - 3)  diphenhydrAMINE   Injectable 12.5 milliGRAM(s) IV Push every 4 hours PRN Itching  magnesium hydroxide Suspension 30 milliLiter(s) Oral every 12 hours PRN Constipation  morphine  - Injectable 4 milliGRAM(s) IV Push every 3 hours PRN Moderate Pain (4 - 6)  oxyCODONE    5 mG/acetaminophen 325 mG 2 Tablet(s) Oral every 4 hours PRN Moderate Pain        OBJECTIVE:     Vital Signs Last 24 Hrs  T(C): 37.1 (18 Mar 2018 23:26), Max: 37.1 (18 Mar 2018 23:26)  T(F): 98.8 (18 Mar 2018 23:26), Max: 98.8 (18 Mar 2018 23:26)  HR: 79 (18 Mar 2018 23:26) (79 - 94)  BP: 93/59 (18 Mar 2018 23:26) (93/59 - 111/68)  BP(mean): --  RR: 16 (18 Mar 2018 23:26) (16 - 18)  SpO2: 98% (18 Mar 2018 23:26) (95% - 98%)    Physical Exam:         Awake and alert, sleeping prone         Neck - supple         Back: Inc - C/D/I         Bilateral Upper Extremities:             Good Motor Strength             Sensation intact         Bilateral Lower Extremities  	(+) R SLR, negative L SLR             Good Motor Strength             Senation intact                   LABS:                        16.0   6.40  )-----------( 260      ( 17 Mar 2018 13:09 )             46.0     03-17    140  |  106  |  21  ----------------------------<  107<H>  4.1   |  27  |  0.87    Ca    9.3      17 Mar 2018 13:09

## 2018-03-19 NOTE — PROGRESS NOTE ADULT - ASSESSMENT
Pt is a 53 y/o male with no significant past medical history who few weeks ago developed LBP with radiation down his RLE s/p excision HNP L4-5 and L5-S1 on 3/13/18. He did well following surgery and was discharged home.  He was admitted with recurrent increased LBP and well as constipation, medicine consult called for comanagement.      * LBP-feels better with steroids, continue pain control, PT.     * Constipation-improved, continue colace, miralax, dulcolox.  * Disp-OOB, ambulate, d/c planning  * Proph- add heparin   * Comm- d/w pt in details, all questions answered

## 2018-03-20 PROCEDURE — 72158 MRI LUMBAR SPINE W/O & W/DYE: CPT | Mod: 26

## 2018-03-20 RX ORDER — OXYCODONE HYDROCHLORIDE 5 MG/1
10 TABLET ORAL EVERY 6 HOURS
Qty: 0 | Refills: 0 | Status: DISCONTINUED | OUTPATIENT
Start: 2018-03-20 | End: 2018-03-20

## 2018-03-20 RX ADMIN — Medication 2 MILLIGRAM(S): at 17:24

## 2018-03-20 RX ADMIN — CYCLOBENZAPRINE HYDROCHLORIDE 10 MILLIGRAM(S): 10 TABLET, FILM COATED ORAL at 15:34

## 2018-03-20 RX ADMIN — OXYCODONE HYDROCHLORIDE 10 MILLIGRAM(S): 5 TABLET ORAL at 15:37

## 2018-03-20 RX ADMIN — Medication 75 MILLIGRAM(S): at 16:12

## 2018-03-20 RX ADMIN — HEPARIN SODIUM 5000 UNIT(S): 5000 INJECTION INTRAVENOUS; SUBCUTANEOUS at 17:23

## 2018-03-20 RX ADMIN — Medication 100 MILLIGRAM(S): at 20:25

## 2018-03-20 RX ADMIN — Medication 2 MILLIGRAM(S): at 05:53

## 2018-03-20 RX ADMIN — Medication 75 MILLIGRAM(S): at 09:51

## 2018-03-20 RX ADMIN — Medication 100 MILLIGRAM(S): at 05:51

## 2018-03-20 RX ADMIN — CYCLOBENZAPRINE HYDROCHLORIDE 10 MILLIGRAM(S): 10 TABLET, FILM COATED ORAL at 05:51

## 2018-03-20 RX ADMIN — Medication 2 MILLIGRAM(S): at 11:28

## 2018-03-20 RX ADMIN — SENNA PLUS 2 TABLET(S): 8.6 TABLET ORAL at 20:25

## 2018-03-20 RX ADMIN — HEPARIN SODIUM 5000 UNIT(S): 5000 INJECTION INTRAVENOUS; SUBCUTANEOUS at 05:51

## 2018-03-20 RX ADMIN — CYCLOBENZAPRINE HYDROCHLORIDE 10 MILLIGRAM(S): 10 TABLET, FILM COATED ORAL at 20:25

## 2018-03-20 NOTE — PROGRESS NOTE ADULT - ASSESSMENT
Pt is a 51 y/o male with no significant past medical history who few weeks ago developed LBP with radiation down his RLE s/p excision HNP L4-5 and L5-S1 on 3/13/18. He did well following surgery and was discharged home.  He was admitted with recurrent increased LBP and well as constipation, medicine consult called for comanagement.      * LBP-persists despite surgery and now steroids, continue pain control, PT.   Agree with MRI with piyush.  * Constipation-improved, continue colace, miralax, dulcolox.  * Disp-OOB, ambulate, d/c planning  * Proph- heparin   * Comm- d/w pt in details, all questions answered, TAZ Salcido

## 2018-03-20 NOTE — PROGRESS NOTE ADULT - SUBJECTIVE AND OBJECTIVE BOX
Chief Complaint: R leg pain    HPI:  Patient with persistent complaints of R leg pain similar to preop. Tried PT/OOB yesterday without success. (+) BM IV steroids continue.    3/20/18 Patient with continued complaints of severe R sciatica. IV steroid taper finished    MEDICATIONS  (STANDING):  cyclobenzaprine 10 milliGRAM(s) Oral every 8 hours  dexamethasone  Injectable 4 milliGRAM(s) IV Push every 6 hours  dexamethasone  Injectable 2 milliGRAM(s) IV Push every 6 hours  docusate sodium 100 milliGRAM(s) Oral three times a day  heparin  Injectable 5000 Unit(s) SubCutaneous every 12 hours  polyethylene glycol 3350 17 Gram(s) Oral daily  senna 2 Tablet(s) Oral at bedtime    MEDICATIONS  (PRN):  acetaminophen   Tablet 650 milliGRAM(s) Oral every 6 hours PRN For Temp greater than 38.5 C (101.3 F)  acetaminophen   Tablet. 650 milliGRAM(s) Oral every 6 hours PRN Mild Pain (1 - 3)  diphenhydrAMINE   Injectable 12.5 milliGRAM(s) IV Push every 4 hours PRN Itching  magnesium hydroxide Suspension 30 milliLiter(s) Oral every 12 hours PRN Constipation  morphine  - Injectable 4 milliGRAM(s) IV Push every 3 hours PRN Moderate Pain (4 - 6)  oxyCODONE    5 mG/acetaminophen 325 mG 2 Tablet(s) Oral every 4 hours PRN Moderate Pain        OBJECTIVE:     Vital Signs Last 24 Hrs  T(C): Vital Signs Last 24 Hrs  T(C): 37 (19 Mar 2018 23:36), Max: 37 (19 Mar 2018 23:36)  T(F): 98.6 (19 Mar 2018 23:36), Max: 98.6 (19 Mar 2018 23:36)  HR: 74 (19 Mar 2018 23:36) (74 - 83)  BP: 107/70 (19 Mar 2018 23:36) (103/60 - 112/71)    Physical Exam:         Awake and alert, sleeping prone, patient extremely frustrated with persistent pain         Neck - supple         Back: Inc - C/D/I         Bilateral Upper Extremities:             Good Motor Strength             Sensation intact         Bilateral Lower Extremities  	(+) R SLR, negative L SLR             Good Motor Strength             Senation intact                   LABS:                        16.0   6.40  )-----------( 260      ( 17 Mar 2018 13:09 )             46.0     03-17    140  |  106  |  21  ----------------------------<  107<H>  4.1   |  27  |  0.87    Ca    9.3      17 Mar 2018 13:09

## 2018-03-20 NOTE — PROGRESS NOTE ADULT - ASSESSMENT
A/P :  53y Male s/p lami/discectomy  -    Continue pain management-increase Lyrica to 100 mg TID  -    OOB/PT  -    Bowel regimen  -    Monitor progress

## 2018-03-20 NOTE — PROGRESS NOTE ADULT - ASSESSMENT
A/P :  53y Male s/p lami/discectomy  -    Continue pain management-taper  -    OOB/PT  -    Bowel regimen  -    MRI LS spine with and without gado with Anesthesia

## 2018-03-20 NOTE — PROGRESS NOTE ADULT - SUBJECTIVE AND OBJECTIVE BOX
Pt is still c/o LBP with radiation down his RLE, no CP or SOB.  He is frustrated due to persistent pain after having his surgery.      Vital Signs Last 24 Hrs  T(C): 37 (19 Mar 2018 23:36), Max: 37 (19 Mar 2018 23:36)  T(F): 98.6 (19 Mar 2018 23:36), Max: 98.6 (19 Mar 2018 23:36)  HR: 74 (19 Mar 2018 23:36) (74 - 83)  BP: 107/70 (19 Mar 2018 23:36) (103/60 - 112/71)  BP(mean): --  RR: 16 (19 Mar 2018 23:36) (16 - 17)  SpO2: 95% (19 Mar 2018 23:36) (95% - 98%)    Daily     Daily     I&O's Detail      CAPILLARY BLOOD GLUCOSE                                                MEDICATIONS  (STANDING):  cyclobenzaprine 10 milliGRAM(s) Oral every 8 hours  dexamethasone  Injectable 2 milliGRAM(s) IV Push every 6 hours  docusate sodium 100 milliGRAM(s) Oral three times a day  heparin  Injectable 5000 Unit(s) SubCutaneous every 12 hours  polyethylene glycol 3350 17 Gram(s) Oral daily  pregabalin 75 milliGRAM(s) Oral three times a day  senna 2 Tablet(s) Oral at bedtime    MEDICATIONS  (PRN):  acetaminophen   Tablet 650 milliGRAM(s) Oral every 6 hours PRN For Temp greater than 38.5 C (101.3 F)  acetaminophen   Tablet. 650 milliGRAM(s) Oral every 6 hours PRN Mild Pain (1 - 3)  diphenhydrAMINE   Injectable 12.5 milliGRAM(s) IV Push every 4 hours PRN Itching  magnesium hydroxide Suspension 30 milliLiter(s) Oral every 12 hours PRN Constipation  morphine  - Injectable 4 milliGRAM(s) IV Push every 3 hours PRN Moderate Pain (4 - 6)  oxyCODONE    5 mG/acetaminophen 325 mG 2 Tablet(s) Oral every 4 hours PRN Moderate Pain

## 2018-03-20 NOTE — PROGRESS NOTE ADULT - SUBJECTIVE AND OBJECTIVE BOX
Chief Complaint: R leg pain    HPI:  Patient with persistent complaints of R leg pain similar to preop. Tried PT/OOB yesterday without success. (+) BM IV steroids continue.    3/20/18 Patient with continued complaints of severe R sciatica. IV steroid taper finished  3./20/18 Returned tonight to review MRI. Patient with slight improvement of R sciatica    MEDICATIONS  (STANDING):  cyclobenzaprine 10 milliGRAM(s) Oral every 8 hours  dexamethasone  Injectable 4 milliGRAM(s) IV Push every 6 hours  dexamethasone  Injectable 2 milliGRAM(s) IV Push every 6 hours  docusate sodium 100 milliGRAM(s) Oral three times a day  heparin  Injectable 5000 Unit(s) SubCutaneous every 12 hours  polyethylene glycol 3350 17 Gram(s) Oral daily  senna 2 Tablet(s) Oral at bedtime    MEDICATIONS  (PRN):  acetaminophen   Tablet 650 milliGRAM(s) Oral every 6 hours PRN For Temp greater than 38.5 C (101.3 F)  acetaminophen   Tablet. 650 milliGRAM(s) Oral every 6 hours PRN Mild Pain (1 - 3)  diphenhydrAMINE   Injectable 12.5 milliGRAM(s) IV Push every 4 hours PRN Itching  magnesium hydroxide Suspension 30 milliLiter(s) Oral every 12 hours PRN Constipation  morphine  - Injectable 4 milliGRAM(s) IV Push every 3 hours PRN Moderate Pain (4 - 6)  oxyCODONE    5 mG/acetaminophen 325 mG 2 Tablet(s) Oral every 4 hours PRN Moderate Pain        OBJECTIVE:     Vital Signs Last 24 Hrs  T(C): 36.9 (20 Mar 2018 17:41), Max: 37.2 (20 Mar 2018 15:27)  T(F): 98.5 (20 Mar 2018 17:41), Max: 99 (20 Mar 2018 15:27)  HR: 76 (20 Mar 2018 17:41) (68 - 91)  BP: 106/64 (20 Mar 2018 17:41) (105/73 - 130/81)  BP(mean): --  RR: 16 (20 Mar 2018 17:41) (15 - 19)  SpO2: 97% (20 Mar 2018 17:41) (95% - 100%)    Physical Exam:         Awake and alert, sleeping prone, patient extremely frustrated with persistent pain         Neck - supple         Back: Inc - C/D/I         Bilateral Upper Extremities:             Good Motor Strength             Sensation intact         Bilateral Lower Extremities  	(+) R SLR though slightly better, negative L SLR             Good Motor Strength             Senation intact             MRI:  Patient with retained disc material centrally at L5/S1, good decompression of L4/5.      LABS:                        16.0   6.40  )-----------( 260      ( 17 Mar 2018 13:09 )             46.0     03-17    140  |  106  |  21  ----------------------------<  107<H>  4.1   |  27  |  0.87    Ca    9.3      17 Mar 2018 13:09

## 2018-03-21 RX ADMIN — Medication 2 MILLIGRAM(S): at 00:56

## 2018-03-21 RX ADMIN — Medication 100 MILLIGRAM(S): at 13:36

## 2018-03-21 RX ADMIN — Medication 100 MILLIGRAM(S): at 05:36

## 2018-03-21 RX ADMIN — Medication 100 MILLIGRAM(S): at 22:09

## 2018-03-21 RX ADMIN — HEPARIN SODIUM 5000 UNIT(S): 5000 INJECTION INTRAVENOUS; SUBCUTANEOUS at 05:36

## 2018-03-21 RX ADMIN — Medication 2 MILLIGRAM(S): at 05:36

## 2018-03-21 RX ADMIN — MORPHINE SULFATE 4 MILLIGRAM(S): 50 CAPSULE, EXTENDED RELEASE ORAL at 22:13

## 2018-03-21 RX ADMIN — CYCLOBENZAPRINE HYDROCHLORIDE 10 MILLIGRAM(S): 10 TABLET, FILM COATED ORAL at 05:36

## 2018-03-21 RX ADMIN — MORPHINE SULFATE 4 MILLIGRAM(S): 50 CAPSULE, EXTENDED RELEASE ORAL at 08:41

## 2018-03-21 RX ADMIN — Medication 2 MILLIGRAM(S): at 17:20

## 2018-03-21 RX ADMIN — HEPARIN SODIUM 5000 UNIT(S): 5000 INJECTION INTRAVENOUS; SUBCUTANEOUS at 17:21

## 2018-03-21 RX ADMIN — CYCLOBENZAPRINE HYDROCHLORIDE 10 MILLIGRAM(S): 10 TABLET, FILM COATED ORAL at 13:36

## 2018-03-21 RX ADMIN — CYCLOBENZAPRINE HYDROCHLORIDE 10 MILLIGRAM(S): 10 TABLET, FILM COATED ORAL at 22:09

## 2018-03-21 RX ADMIN — Medication 100 MILLIGRAM(S): at 13:35

## 2018-03-21 RX ADMIN — Medication 2 MILLIGRAM(S): at 11:52

## 2018-03-21 RX ADMIN — SENNA PLUS 2 TABLET(S): 8.6 TABLET ORAL at 22:10

## 2018-03-21 NOTE — PROGRESS NOTE ADULT - ASSESSMENT
A/P :  53y Male s/p lami/discectomy  -    Continue pain management and Lyrica to 100 mg TID  -    OOB/PT  -    Discussed options with patient. He is not responding to nonoperative care. Dr. Sher would like MRI pelvis to r/o hip pathology and if negative, recommends lami/fusion L4-S1. Patient agreeable. Will arrange MRI pelvis with Anesthesia.  -    Possible surgery Friday if OR time available.  -    Monitor progress

## 2018-03-21 NOTE — PROGRESS NOTE ADULT - SUBJECTIVE AND OBJECTIVE BOX
Chief Complaint: R leg pain    HPI:  Patient with persistent complaints of R leg pain similar to preop. Tried PT/OOB yesterday without success. (+) BM IV steroids continue.    3/20/18 Patient with continued complaints of severe R sciatica. IV steroid taper finished  3./20/18 Returned tonight to review MRI. Patient with slight improvement of R sciatica  3/21/19 Patient with marked persistent R leg pain. No benefit with steroids nor increased Lyrica dosing. Markedly frustrated. Pain worse with sitting    MEDICATIONS  (STANDING):  cyclobenzaprine 10 milliGRAM(s) Oral every 8 hours  dexamethasone  Injectable 4 milliGRAM(s) IV Push every 6 hours  dexamethasone  Injectable 2 milliGRAM(s) IV Push every 6 hours  docusate sodium 100 milliGRAM(s) Oral three times a day  heparin  Injectable 5000 Unit(s) SubCutaneous every 12 hours  polyethylene glycol 3350 17 Gram(s) Oral daily  senna 2 Tablet(s) Oral at bedtime    MEDICATIONS  (PRN):  acetaminophen   Tablet 650 milliGRAM(s) Oral every 6 hours PRN For Temp greater than 38.5 C (101.3 F)  acetaminophen   Tablet. 650 milliGRAM(s) Oral every 6 hours PRN Mild Pain (1 - 3)  diphenhydrAMINE   Injectable 12.5 milliGRAM(s) IV Push every 4 hours PRN Itching  magnesium hydroxide Suspension 30 milliLiter(s) Oral every 12 hours PRN Constipation  morphine  - Injectable 4 milliGRAM(s) IV Push every 3 hours PRN Moderate Pain (4 - 6)  oxyCODONE    5 mG/acetaminophen 325 mG 2 Tablet(s) Oral every 4 hours PRN Moderate Pain        OBJECTIVE:     Vital Signs Last 24 Hrs  T(C): 37.1 (21 Mar 2018 11:08), Max: 37.2 (20 Mar 2018 15:27)  T(F): 98.7 (21 Mar 2018 11:08), Max: 99 (20 Mar 2018 15:27)  HR: 76 (21 Mar 2018 11:08) (72 - 91)  BP: 109/68 (21 Mar 2018 11:08) (101/63 - 125/80)  BP(mean): --  RR: 16 (21 Mar 2018 11:08) (15 - 19)  SpO2: 98% (21 Mar 2018 11:08) (97% - 99%)    Physical Exam:         Awake and alert, sleeping prone, patient extremely frustrated with persistent pain         Neck - supple         Back: Inc - C/D/I         Bilateral Upper Extremities:             Good Motor Strength             Sensation intact         Bilateral Lower Extremities  	(+) R SLR though slightly better, negative L SLR             Good Motor Strength             Senation intact             MRI:  Patient with retained disc material centrally at L5/S1, good decompression of L4/5.      LABS:                        16.0   6.40  )-----------( 260      ( 17 Mar 2018 13:09 )             46.0     03-17    140  |  106  |  21  ----------------------------<  107<H>  4.1   |  27  |  0.87    Ca    9.3      17 Mar 2018 13:09

## 2018-03-21 NOTE — PROGRESS NOTE ADULT - SUBJECTIVE AND OBJECTIVE BOX
Pt is still c/o LBP with radiation down his RLE which is slightly better, no weakness.    Vital Signs Last 24 Hrs  T(C): 37.1 (21 Mar 2018 11:08), Max: 37.2 (20 Mar 2018 15:27)  T(F): 98.7 (21 Mar 2018 11:08), Max: 99 (20 Mar 2018 15:27)  HR: 76 (21 Mar 2018 11:08) (72 - 91)  BP: 109/68 (21 Mar 2018 11:08) (101/63 - 125/80)  BP(mean): --  RR: 16 (21 Mar 2018 11:08) (15 - 19)  SpO2: 98% (21 Mar 2018 11:08) (97% - 99%)    Daily     Daily     I&O's Detail    20 Mar 2018 07:01  -  21 Mar 2018 07:00  --------------------------------------------------------  IN:    Other: 100 mL  Total IN: 100 mL    OUT:  Total OUT: 0 mL    Total NET: 100 mL      21 Mar 2018 07:01  -  21 Mar 2018 11:39  --------------------------------------------------------  IN:    Oral Fluid: 240 mL  Total IN: 240 mL    OUT:    Voided: 1 mL  Total OUT: 1 mL    Total NET: 239 mL          CAPILLARY BLOOD GLUCOSE                    MRI-< from: MR Lumbar Spine No Cont (03.04.18 @ 10:09) >   Multilevel degenerative disc disease and spondylosis at L1-2   through L5-S1 with bulges at L1-2 through L5-S1 that narrow the BILATERAL   L3-4 through L5-S1 neural foramina. Small LEFT foraminal disc herniation   is noted at T12-L1 which narrows the LEFT neural foramen and abuts the   exiting LEFT T12 nerve root. Small LEFT lateral disc herniation is noted   at L1-2. Tiny central disc herniations noted at L3-4 and L4-5 with mild   central stenosis on a degenerative basis. Small central disc herniation   at L5-S1 compresses the ventral thecal sac and the exiting RIGHT S1 nerve   root..      < end of copied text >  < from: MR Lumbar Spine No Cont (03.04.18 @ 10:09) >      < end of copied text >                              MEDICATIONS  (STANDING):  cyclobenzaprine 10 milliGRAM(s) Oral every 8 hours  dexamethasone  Injectable 2 milliGRAM(s) IV Push every 6 hours  docusate sodium 100 milliGRAM(s) Oral three times a day  heparin  Injectable 5000 Unit(s) SubCutaneous every 12 hours  polyethylene glycol 3350 17 Gram(s) Oral daily  pregabalin 100 milliGRAM(s) Oral three times a day  senna 2 Tablet(s) Oral at bedtime    MEDICATIONS  (PRN):  acetaminophen   Tablet 650 milliGRAM(s) Oral every 6 hours PRN For Temp greater than 38.5 C (101.3 F)  acetaminophen   Tablet. 650 milliGRAM(s) Oral every 6 hours PRN Mild Pain (1 - 3)  diphenhydrAMINE   Injectable 12.5 milliGRAM(s) IV Push every 4 hours PRN Itching  magnesium hydroxide Suspension 30 milliLiter(s) Oral every 12 hours PRN Constipation  morphine  - Injectable 4 milliGRAM(s) IV Push every 3 hours PRN Moderate Pain (4 - 6)  oxyCODONE    5 mG/acetaminophen 325 mG 2 Tablet(s) Oral every 4 hours PRN Moderate Pain

## 2018-03-21 NOTE — PROGRESS NOTE ADULT - ASSESSMENT
Pt is a 51 y/o male with no significant past medical history who few weeks ago developed LBP with radiation down his RLE s/p excision HNP L4-5 and L5-S1 on 3/13/18. He did well following surgery and was discharged home.  He was admitted with recurrent increased LBP and well as constipation, medicine consult called for comanagement.      * LBP-persists despite surgery, MRI with persistent herniation, continue pain control, PT.   Further management per spine surgery  * Constipation-improved, continue colace, miralax, dulcolox.  * Disp-OOB, ambulate   * Proph- heparin   * Comm- d/w pt in details, all questions answered

## 2018-03-22 LAB
ANION GAP SERPL CALC-SCNC: 9 MMOL/L — SIGNIFICANT CHANGE UP (ref 5–17)
BLD GP AB SCN SERPL QL: SIGNIFICANT CHANGE UP
BUN SERPL-MCNC: 22 MG/DL — SIGNIFICANT CHANGE UP (ref 7–23)
CALCIUM SERPL-MCNC: 8.7 MG/DL — SIGNIFICANT CHANGE UP (ref 8.5–10.1)
CHLORIDE SERPL-SCNC: 104 MMOL/L — SIGNIFICANT CHANGE UP (ref 96–108)
CO2 SERPL-SCNC: 24 MMOL/L — SIGNIFICANT CHANGE UP (ref 22–31)
CREAT SERPL-MCNC: 0.86 MG/DL — SIGNIFICANT CHANGE UP (ref 0.5–1.3)
GLUCOSE SERPL-MCNC: 92 MG/DL — SIGNIFICANT CHANGE UP (ref 70–99)
HCT VFR BLD CALC: 42.7 % — SIGNIFICANT CHANGE UP (ref 39–50)
HGB BLD-MCNC: 14.8 G/DL — SIGNIFICANT CHANGE UP (ref 13–17)
INR BLD: 1.02 RATIO — SIGNIFICANT CHANGE UP (ref 0.88–1.16)
MCHC RBC-ENTMCNC: 30.6 PG — SIGNIFICANT CHANGE UP (ref 27–34)
MCHC RBC-ENTMCNC: 34.7 GM/DL — SIGNIFICANT CHANGE UP (ref 32–36)
MCV RBC AUTO: 88.4 FL — SIGNIFICANT CHANGE UP (ref 80–100)
NRBC # BLD: 0 /100 WBCS — SIGNIFICANT CHANGE UP (ref 0–0)
PLATELET # BLD AUTO: 263 K/UL — SIGNIFICANT CHANGE UP (ref 150–400)
POTASSIUM SERPL-MCNC: 3.9 MMOL/L — SIGNIFICANT CHANGE UP (ref 3.5–5.3)
POTASSIUM SERPL-SCNC: 3.9 MMOL/L — SIGNIFICANT CHANGE UP (ref 3.5–5.3)
PROTHROM AB SERPL-ACNC: 11 SEC — SIGNIFICANT CHANGE UP (ref 9.8–12.7)
RBC # BLD: 4.83 M/UL — SIGNIFICANT CHANGE UP (ref 4.2–5.8)
RBC # FLD: 12.7 % — SIGNIFICANT CHANGE UP (ref 10.3–14.5)
SODIUM SERPL-SCNC: 137 MMOL/L — SIGNIFICANT CHANGE UP (ref 135–145)
TYPE + AB SCN PNL BLD: SIGNIFICANT CHANGE UP
WBC # BLD: 6.88 K/UL — SIGNIFICANT CHANGE UP (ref 3.8–10.5)
WBC # FLD AUTO: 6.88 K/UL — SIGNIFICANT CHANGE UP (ref 3.8–10.5)

## 2018-03-22 PROCEDURE — 72195 MRI PELVIS W/O DYE: CPT | Mod: 26

## 2018-03-22 RX ORDER — SODIUM CHLORIDE 9 MG/ML
1000 INJECTION, SOLUTION INTRAVENOUS
Qty: 0 | Refills: 0 | Status: DISCONTINUED | OUTPATIENT
Start: 2018-03-22 | End: 2018-03-24

## 2018-03-22 RX ADMIN — Medication 100 MILLIGRAM(S): at 07:03

## 2018-03-22 RX ADMIN — CYCLOBENZAPRINE HYDROCHLORIDE 10 MILLIGRAM(S): 10 TABLET, FILM COATED ORAL at 07:03

## 2018-03-22 RX ADMIN — Medication 2 MILLIGRAM(S): at 23:47

## 2018-03-22 RX ADMIN — SODIUM CHLORIDE 75 MILLILITER(S): 9 INJECTION, SOLUTION INTRAVENOUS at 15:18

## 2018-03-22 RX ADMIN — Medication 2 MILLIGRAM(S): at 12:09

## 2018-03-22 RX ADMIN — Medication 2 MILLIGRAM(S): at 00:01

## 2018-03-22 RX ADMIN — HEPARIN SODIUM 5000 UNIT(S): 5000 INJECTION INTRAVENOUS; SUBCUTANEOUS at 07:02

## 2018-03-22 RX ADMIN — HEPARIN SODIUM 5000 UNIT(S): 5000 INJECTION INTRAVENOUS; SUBCUTANEOUS at 17:22

## 2018-03-22 RX ADMIN — CYCLOBENZAPRINE HYDROCHLORIDE 10 MILLIGRAM(S): 10 TABLET, FILM COATED ORAL at 21:44

## 2018-03-22 RX ADMIN — Medication 150 MILLIGRAM(S): at 13:25

## 2018-03-22 RX ADMIN — Medication 2 MILLIGRAM(S): at 17:22

## 2018-03-22 RX ADMIN — CYCLOBENZAPRINE HYDROCHLORIDE 10 MILLIGRAM(S): 10 TABLET, FILM COATED ORAL at 13:25

## 2018-03-22 RX ADMIN — Medication 2 MILLIGRAM(S): at 07:02

## 2018-03-22 RX ADMIN — Medication 150 MILLIGRAM(S): at 21:44

## 2018-03-22 NOTE — PROGRESS NOTE ADULT - ASSESSMENT
51 yo male with no sig PMH initially presented for evaluation of lower back pain and radiculopathy in the right lower extremity 3 weeks ago. Pt had also developed R foot paresthesias with some weakness in his foot / great toe. MRI done at that time which showed DJD, notable for Right S1 exiting nerve root compression. Pt underwent lami/discectomy with Dr Duke on 3/13 and states he "woke up with the same pain" he had preop.     MRI scans demonstrate significant degenerative disc disease at L5/S1 with bilateral foraminal narrowing secondary to collapse of the disc space.     I have had a long discussion with the patient, his brother, and his father regarding his options at this time. I have counselled him that I believe that his source of pain is likely a combination of his degenerative disc disease causing back pain and severe foraminal and lateral recess stenosis at L5/S1 causing L5 and S1 nerve root impingement on the left. I suspect the primary nerve involved is S1 based on his description of his pain. I have explained the difference between a foraminotomy and a lumbar interbody fusion, with the interbody fusion being more definitive, but associated with additional risks such as adjacent level degeneration, non-unions, hardware failure, and others. At this stage, I have answered all of the questions posed by the patient and his family. In my opinion, without guarantees, I am in agreement with Dr. Sher's proposed L5-S1 decompression and interbody fusion with the goal of decompression of the L5 and S1 nerve roots directly, and indirect decompression with a interbody cage. I have additionally stated that the fusion procedure may help with his back pain which is likely secondary to the bone-on-bone phenomenon at L5/S1. The family would like to take some time to discuss their options at this time and will let the attending team know about their decision once they've made it.

## 2018-03-22 NOTE — PROGRESS NOTE ADULT - ASSESSMENT
A/P :  53y Male s/p lami/discectomy  -    Continue pain management and increase Lyrica to 150 mg TID  -    OOB/PT  -    Discussed options with patient. He is not responding to nonoperative care. Dr. Sher would like MRI pelvis to r/o hip pathology and if negative, recommends lami/fusion L4-S1. Patient agreeable. Will arrange MRI pelvis with Anesthesia.  -    Possible surgery Friday if OR time available.  -    Patient and family equesting second opinion.-Will consult Dr. Castelan  -    Monitor progress

## 2018-03-22 NOTE — PROGRESS NOTE ADULT - SUBJECTIVE AND OBJECTIVE BOX
Chief Complaint: R leg pain    HPI:  Patient with persistent complaints of R leg pain similar to preop. Tried PT/OOB yesterday without success. (+) BM IV steroids continue.    3/20/18 Patient with continued complaints of severe R sciatica. IV steroid taper finished  3./20/18 Returned tonight to review MRI. Patient with slight improvement of R sciatica  3/21/19 Patient with marked persistent R leg pain. No benefit with steroids nor increased Lyrica dosing. Markedly frustrated. Pain worse with sitting  3/22/18 Patient with persistent sciatica. Awaiting pelvic MRI    MEDICATIONS  (STANDING):  cyclobenzaprine 10 milliGRAM(s) Oral every 8 hours  dexamethasone  Injectable 4 milliGRAM(s) IV Push every 6 hours  dexamethasone  Injectable 2 milliGRAM(s) IV Push every 6 hours  docusate sodium 100 milliGRAM(s) Oral three times a day  heparin  Injectable 5000 Unit(s) SubCutaneous every 12 hours  polyethylene glycol 3350 17 Gram(s) Oral daily  senna 2 Tablet(s) Oral at bedtime    MEDICATIONS  (PRN):  acetaminophen   Tablet 650 milliGRAM(s) Oral every 6 hours PRN For Temp greater than 38.5 C (101.3 F)  acetaminophen   Tablet. 650 milliGRAM(s) Oral every 6 hours PRN Mild Pain (1 - 3)  diphenhydrAMINE   Injectable 12.5 milliGRAM(s) IV Push every 4 hours PRN Itching  magnesium hydroxide Suspension 30 milliLiter(s) Oral every 12 hours PRN Constipation  morphine  - Injectable 4 milliGRAM(s) IV Push every 3 hours PRN Moderate Pain (4 - 6)  oxyCODONE    5 mG/acetaminophen 325 mG 2 Tablet(s) Oral every 4 hours PRN Moderate Pain        OBJECTIVE:     Vital Signs Last 24 Hrs  T(C): 36.9 (21 Mar 2018 22:07), Max: 37.1 (21 Mar 2018 11:08)  T(F): 98.4 (21 Mar 2018 22:07), Max: 98.7 (21 Mar 2018 11:08)  HR: 80 (21 Mar 2018 22:07) (76 - 89)  BP: 109/74 (21 Mar 2018 22:07) (109/66 - 109/74)  BP(mean): --  RR: 16 (21 Mar 2018 22:07) (16 - 16)  SpO2: 98% (21 Mar 2018 22:07) (96% - 98%)    Physical Exam:         Awake and alert, sleeping prone, patient extremely frustrated with persistent pain         Neck - supple         Back: Inc - C/D/I         Bilateral Upper Extremities:             Good Motor Strength             Sensation intact         Bilateral Lower Extremities  	(+) R SLR though 45degrees supine, negative L SLR             Good Motor Strength             Senation intact             MRI:  Patient with retained disc material centrally at L5/S1, good decompression of L4/5.      LABS:                        16.0   6.40  )-----------( 260      ( 17 Mar 2018 13:09 )             46.0     03-17    140  |  106  |  21  ----------------------------<  107<H>  4.1   |  27  |  0.87    Ca    9.3      17 Mar 2018 13:09

## 2018-03-22 NOTE — PROGRESS NOTE ADULT - SUBJECTIVE AND OBJECTIVE BOX
Pt still c/o LBP with radiation down his RLE, no new complaints.  Awaiting pelvic MRI.    Vital Signs Last 24 Hrs  T(C): 36.9 (21 Mar 2018 22:07), Max: 37.1 (21 Mar 2018 11:08)  T(F): 98.4 (21 Mar 2018 22:07), Max: 98.7 (21 Mar 2018 11:08)  HR: 80 (21 Mar 2018 22:07) (76 - 89)  BP: 109/74 (21 Mar 2018 22:07) (109/66 - 109/74)  BP(mean): --  RR: 16 (21 Mar 2018 22:07) (16 - 16)  SpO2: 98% (21 Mar 2018 22:07) (96% - 98%)    Daily     Daily     I&O's Detail    21 Mar 2018 07:01  -  22 Mar 2018 07:00  --------------------------------------------------------  IN:    Oral Fluid: 840 mL  Total IN: 840 mL    OUT:    Voided: 401 mL  Total OUT: 401 mL    Total NET: 439 mL          CAPILLARY BLOOD GLUCOSE                                                MEDICATIONS  (STANDING):  cyclobenzaprine 10 milliGRAM(s) Oral every 8 hours  dexamethasone  Injectable 2 milliGRAM(s) IV Push every 6 hours  docusate sodium 100 milliGRAM(s) Oral three times a day  heparin  Injectable 5000 Unit(s) SubCutaneous every 12 hours  polyethylene glycol 3350 17 Gram(s) Oral daily  pregabalin 100 milliGRAM(s) Oral three times a day  senna 2 Tablet(s) Oral at bedtime    MEDICATIONS  (PRN):  acetaminophen   Tablet 650 milliGRAM(s) Oral every 6 hours PRN For Temp greater than 38.5 C (101.3 F)  acetaminophen   Tablet. 650 milliGRAM(s) Oral every 6 hours PRN Mild Pain (1 - 3)  diphenhydrAMINE   Injectable 12.5 milliGRAM(s) IV Push every 4 hours PRN Itching  magnesium hydroxide Suspension 30 milliLiter(s) Oral every 12 hours PRN Constipation  morphine  - Injectable 4 milliGRAM(s) IV Push every 3 hours PRN Moderate Pain (4 - 6)  oxyCODONE    5 mG/acetaminophen 325 mG 2 Tablet(s) Oral every 4 hours PRN Moderate Pain

## 2018-03-22 NOTE — CONSULT NOTE ADULT - ASSESSMENT
51 yo male with no sig PMH initially presented for evaluation of lower back pain and radiculopathy in the right lower extremity 3 weeks ago. Pt had also developed R foot paresthesias with some weakness in his foot / great toe. MRI done at that time which showed DJD, notable for Right S1 exiting nerve root compression. Pt underwent lami/discectomy with Dr Duke on 3/13 and states he "woke up with the same pain" he had preop.    -No acute neurosurgical intervention  -Attending to review films    Discussed with Dr Castelan

## 2018-03-22 NOTE — CONSULT NOTE ADULT - SUBJECTIVE AND OBJECTIVE BOX
Patient is a 53y old  Male who presents with a chief complaint of back pain with R LE radiculopathy(17 Mar 2018 22:14)    HPI:  53 yo male with no sig PMH initially presented for evaluation of lower back pain and radiculopathy in the right lower extremity. Pt states symptoms began ~4 weeks ago, he denies trauma at onset, states he had been shoveling snow previously with no difficulty. Pain began gradually and progressed, worse with movement, relieved by rest. No bowel or bladder incontinence. Pt had also developed R foot paresthesias with some weakness in his foot / great toe. MRI done which showed DJD, notable for Right S1 exiting nerve root compression. Pt underwent lami/discectomy with Dr Duke on 3/13 and states he "woke up with the same pain" he had preop. He has tried course of steroids with no reduction in pain. Neurosurgery called for 2nd opinion. At the time of my exam pt appears to be uncomfortable in bed, admits to LBP with radiation to distal R LE, he denies HA, visual changes, other focal numb / ting / weak, word finding difficulty, neck stiffness, photophobia.       PAST MEDICAL & SURGICAL HISTORY:  No pertinent past medical history      FAMILY HISTORY:  Noncontributory       Social Hx:  Nonsmoker, no drug or alcohol use      Allergies  No Known Allergies      MEDICATIONS  (STANDING):  cyclobenzaprine 10 milliGRAM(s) Oral every 8 hours  dexamethasone  Injectable 2 milliGRAM(s) IV Push every 6 hours  docusate sodium 100 milliGRAM(s) Oral three times a day  heparin  Injectable 5000 Unit(s) SubCutaneous every 12 hours  polyethylene glycol 3350 17 Gram(s) Oral daily  pregabalin 150 milliGRAM(s) Oral three times a day  senna 2 Tablet(s) Oral at bedtime       ROS: Pertinent positives in HPI, all other ROS were reviewed and are negative.        Vital Signs Last 24 Hrs  T(C): 36.9 (21 Mar 2018 22:07), Max: 36.9 (21 Mar 2018 17:07)  T(F): 98.4 (21 Mar 2018 22:07), Max: 98.4 (21 Mar 2018 17:07)  HR: 80 (21 Mar 2018 22:07) (80 - 89)  BP: 109/74 (21 Mar 2018 22:07) (109/66 - 109/74)  RR: 16 (21 Mar 2018 22:07) (16 - 16)  SpO2: 98% (21 Mar 2018 22:07) (96% - 98%)      Labs:                        14.8   6.88  )-----------( 263      ( 22 Mar 2018 12:06 )             42.7     137  |  104  |  22  ----------------------------<  92  3.9   |  24  |  0.86    Ca    8.7      22 Mar 2018 12:06    PT/INR - ( 22 Mar 2018 12:06 )   PT: 11.0 sec;   INR: 1.02 ratio         Radiology report:  MR Lumbar Spine w/wo IV Cont (03.20.18 @ 15:27) >  Evidence of prior RIGHT L4 and L5 laminotomies. RIGHT   paracentral disc herniations at L4-5 and L5-S1 compressing the ventral   thecal sac and the traversing RIGHT S1 nerve root. Mild central stenosis   at L3-4and L4-5 on a degenerative basis. Small LEFT lateral disc   herniation at L1-2.        Physical Exam:  Constitutional: Awake / alert  HEENT: PERRLA, EOMI  Neck: Supple  Respiratory: Breath sounds are clear bilaterally  Cardiovascular: S1 and S2, regular rhythm  Gastrointestinal: Soft, NT/ND  Extremities:  no edema  Vascular: No carotid Bruit  Musculoskeletal: no joint swelling/tenderness, no abnormal movements  Skin: No rashes    Neurological Exam:  HF: A x O x 3, appropriately interactive, normal affect, speech fluent, no aphasia or paraphasic errors. Naming /repetition intact   CN: PERRL, EOMI, VFF, facial sensation normal, no NLFD, tongue midline  Motor: No pronator drift, Strength 5/5 in b/l UEs, R LE limited 2nd to pain grossly antigravity, 4+/5 DF, 4-/5 EHL, +SLR on right, L LE 5/5 except EHL 4/5, normal bulk and tone, no tremor, rigidity or bradykinesia  Sens: dec sens distal R LE, otherwise Intact to light touch  Reflexes: Symmetric / depressed, no clonus, downgoing toes b/l  Gait/Balance: Cannot test

## 2018-03-22 NOTE — PROGRESS NOTE ADULT - SUBJECTIVE AND OBJECTIVE BOX
Patient is a 53y old  Male who presents with a chief complaint of back pain with R LE radiculopathy(17 Mar 2018 22:14)    HPI:  51 yo male with no sig PMH initially presented for evaluation of lower back pain and radiculopathy in the right lower extremity. Pt states symptoms began ~4 weeks ago, he denies trauma at onset, states he had been shoveling snow previously with no difficulty. Pain began gradually and progressed, worse with movement, relieved by rest. No bowel or bladder incontinence. Pt had also developed R foot paresthesias with some weakness in his foot / great toe. MRI done which showed DJD, notable for Right S1 exiting nerve root compression. Pt underwent lami/discectomy with Dr Duke on 3/13 and states he "woke up with the same pain" he had preop. He has tried course of steroids with no reduction in pain. Neurosurgery called for 2nd opinion. At the time of my exam pt appears to be uncomfortable in bed, admits to LBP with radiation to distal R LE, he denies HA, visual changes, other focal numb / ting / weak, word finding difficulty, neck stiffness, photophobia.     Patient states his pain is currently 10/10 in severity preventing him from performing his activities of daily living. He states that the pain is constant, but worse when standing, better with recumbency. Pain is described as a deep seated aching sensation that radiates from his back down the back of his right leg consistent with an S1 nerve root dermatomal distribution. His reflexes are intact, but diminished. He demonstrates 5/5 strength in the left lower extremity with hip flexion, knee extension, knee flexion, ankle dorsiflexion, ankle plantarflexion, and extension of the hallucis longus. His right leg examination is significantly limited by pain. His straight leg raise is positive. His motor examination demonstrates giveway weakness secondary to pain, but is rated at 5/5 strength except extension of the hallucis longus which is rated at 4/5.       PAST MEDICAL & SURGICAL HISTORY:  No pertinent past medical history      FAMILY HISTORY:  Noncontributory       Social Hx:  Nonsmoker, no drug or alcohol use      Allergies  No Known Allergies      MEDICATIONS  (STANDING):  cyclobenzaprine 10 milliGRAM(s) Oral every 8 hours  dexamethasone  Injectable 2 milliGRAM(s) IV Push every 6 hours  docusate sodium 100 milliGRAM(s) Oral three times a day  heparin  Injectable 5000 Unit(s) SubCutaneous every 12 hours  polyethylene glycol 3350 17 Gram(s) Oral daily  pregabalin 150 milliGRAM(s) Oral three times a day  senna 2 Tablet(s) Oral at bedtime       ROS: Pertinent positives in HPI, all other ROS were reviewed and are negative.        Vital Signs Last 24 Hrs  T(C): 36.9 (21 Mar 2018 22:07), Max: 36.9 (21 Mar 2018 17:07)  T(F): 98.4 (21 Mar 2018 22:07), Max: 98.4 (21 Mar 2018 17:07)  HR: 80 (21 Mar 2018 22:07) (80 - 89)  BP: 109/74 (21 Mar 2018 22:07) (109/66 - 109/74)  RR: 16 (21 Mar 2018 22:07) (16 - 16)  SpO2: 98% (21 Mar 2018 22:07) (96% - 98%)      Labs:                        14.8   6.88  )-----------( 263      ( 22 Mar 2018 12:06 )             42.7     137  |  104  |  22  ----------------------------<  92  3.9   |  24  |  0.86    Ca    8.7      22 Mar 2018 12:06    PT/INR - ( 22 Mar 2018 12:06 )   PT: 11.0 sec;   INR: 1.02 ratio         Radiology report:  MR Lumbar Spine w/wo IV Cont (03.20.18 @ 15:27) >  Evidence of prior RIGHT L4 and L5 laminotomies. RIGHT   paracentral disc herniations at L4-5 and L5-S1 compressing the ventral   thecal sac and the traversing RIGHT S1 nerve root. Mild central stenosis   at L3-4and L4-5 on a degenerative basis. Small LEFT lateral disc   herniation at L1-2.        Physical Exam:  Constitutional: Awake / alert  HEENT: PERRLA, EOMI  Neck: Supple  Respiratory: Breath sounds are clear bilaterally  Cardiovascular: S1 and S2, regular rhythm  Gastrointestinal: Soft, NT/ND  Extremities:  no edema  Vascular: No carotid Bruit  Musculoskeletal: no joint swelling/tenderness, no abnormal movements  Skin: No rashes    Neurological Exam:  HF: A x O x 3, appropriately interactive, normal affect, speech fluent, no aphasia or paraphasic errors. Naming /repetition intact   CN: PERRL, EOMI, VFF, facial sensation normal, no NLFD, tongue midline  Motor: No pronator drift, Strength 5/5 in b/l UEs, R LE limited 2nd to pain grossly antigravity, 4+/5 DF, 4-/5 EHL, +SLR on right, L LE 5/5 except EHL 4/5, normal bulk and tone, no tremor, rigidity or bradykinesia  Sens: dec sens distal R LE, otherwise Intact to light touch  Reflexes: Symmetric / depressed, no clonus, downgoing toes b/l  Gait/Balance: Cannot test

## 2018-03-22 NOTE — PROGRESS NOTE ADULT - ASSESSMENT
Pt is a 51 y/o male with no significant past medical history who few weeks ago developed LBP with radiation down his RLE s/p excision HNP L4-5 and L5-S1 on 3/13/18. He did well following surgery and was discharged home.  He was admitted with recurrent increased LBP and well as constipation, medicine consult called for comanagement.      * LBP-persists despite surgery, MRI with persistent herniation, continue pain control, PT.   Pelvic MRI, may need further surgery.  If repeat spine surgery needed, he has low clinical predictors for intermediate risk surgery and is medically optimized     * Constipation-improved, continue colace, miralax, dulcolox.  * Disp-OOB, ambulate   * Proph- heparin   * Comm- d/w pt in details, all questions answered, RN, Omari (yesterday)

## 2018-03-23 RX ADMIN — Medication 2 MILLIGRAM(S): at 17:45

## 2018-03-23 RX ADMIN — Medication 2 MILLIGRAM(S): at 05:31

## 2018-03-23 RX ADMIN — HEPARIN SODIUM 5000 UNIT(S): 5000 INJECTION INTRAVENOUS; SUBCUTANEOUS at 17:45

## 2018-03-23 RX ADMIN — CYCLOBENZAPRINE HYDROCHLORIDE 10 MILLIGRAM(S): 10 TABLET, FILM COATED ORAL at 13:33

## 2018-03-23 RX ADMIN — Medication 150 MILLIGRAM(S): at 21:47

## 2018-03-23 RX ADMIN — Medication 150 MILLIGRAM(S): at 13:33

## 2018-03-23 RX ADMIN — Medication 150 MILLIGRAM(S): at 05:31

## 2018-03-23 RX ADMIN — CYCLOBENZAPRINE HYDROCHLORIDE 10 MILLIGRAM(S): 10 TABLET, FILM COATED ORAL at 21:47

## 2018-03-23 RX ADMIN — Medication 2 MILLIGRAM(S): at 23:23

## 2018-03-23 RX ADMIN — CYCLOBENZAPRINE HYDROCHLORIDE 10 MILLIGRAM(S): 10 TABLET, FILM COATED ORAL at 05:31

## 2018-03-23 RX ADMIN — Medication 2 MILLIGRAM(S): at 11:08

## 2018-03-23 NOTE — PROGRESS NOTE ADULT - ASSESSMENT
Pt is a 53 y/o male with no significant past medical history who few weeks ago developed LBP with radiation down his RLE s/p excision HNP L4-5 and L5-S1 on 3/13/18. He did well following surgery and was discharged home.  He was admitted with recurrent increased LBP and well as constipation, medicine consult called for comanagement.      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   Pt wants to proceed with further surgery with Dr Duke team which is tentatively scheduled for Tuesday.     * Constipation-due to narcotics, continue colace, Miralax, Dulcolax  * Disp-OOB, ambulate   * Proph- heparin   * Comm- d/w pt in details, all questions answered, emotional support provided, RN

## 2018-03-23 NOTE — PROGRESS NOTE ADULT - ASSESSMENT
A/P :  53y Male s/p lami/discectomy  -    Continue pain management and continue Lyrica to 150 mg TID  -    OOB/PT  -    Discussed options with patient. Patient candidate for further surgery. Patient and family to decide if they will proceed with our practice.  -    OR available Tuesday.   -    Monitor progress  -    Will discuss with Dr. Sher and Srikanth

## 2018-03-23 NOTE — PROGRESS NOTE ADULT - SUBJECTIVE AND OBJECTIVE BOX
Chief Complaint: R leg pain    HPI:  Patient with persistent complaints of R leg pain similar to preop. Tried PT/OOB yesterday without success. (+) BM IV steroids continue.    3/20/18 Patient with continued complaints of severe R sciatica. IV steroid taper finished  3./20/18 Returned tonight to review MRI. Patient with slight improvement of R sciatica  3/21/19 Patient with marked persistent R leg pain. No benefit with steroids nor increased Lyrica dosing. Markedly frustrated. Pain worse with sitting  3/22/18 Patient with persistent sciatica. Awaiting pelvic MRI  3/23/18 Patient with persistent pain. Second opinion appreciated    MEDICATIONS  (STANDING):  cyclobenzaprine 10 milliGRAM(s) Oral every 8 hours  dexamethasone  Injectable 4 milliGRAM(s) IV Push every 6 hours  dexamethasone  Injectable 2 milliGRAM(s) IV Push every 6 hours  docusate sodium 100 milliGRAM(s) Oral three times a day  heparin  Injectable 5000 Unit(s) SubCutaneous every 12 hours  polyethylene glycol 3350 17 Gram(s) Oral daily  senna 2 Tablet(s) Oral at bedtime    MEDICATIONS  (PRN):  acetaminophen   Tablet 650 milliGRAM(s) Oral every 6 hours PRN For Temp greater than 38.5 C (101.3 F)  acetaminophen   Tablet. 650 milliGRAM(s) Oral every 6 hours PRN Mild Pain (1 - 3)  diphenhydrAMINE   Injectable 12.5 milliGRAM(s) IV Push every 4 hours PRN Itching  magnesium hydroxide Suspension 30 milliLiter(s) Oral every 12 hours PRN Constipation  morphine  - Injectable 4 milliGRAM(s) IV Push every 3 hours PRN Moderate Pain (4 - 6)  oxyCODONE    5 mG/acetaminophen 325 mG 2 Tablet(s) Oral every 4 hours PRN Moderate Pain        OBJECTIVE:     Vital Signs Last 24 Hrs  T(C): 37.1 (22 Mar 2018 23:20), Max: 37.1 (22 Mar 2018 23:20)  T(F): 98.7 (22 Mar 2018 23:20), Max: 98.7 (22 Mar 2018 23:20)  HR: 87 (22 Mar 2018 23:20) (66 - 93)  BP: 105/63 (22 Mar 2018 23:20) (92/57 - 114/30)  BP(mean): --  RR: 16 (22 Mar 2018 23:20) (12 - 23)  SpO2: 97% (22 Mar 2018 23:20) (96% - 98%))    Physical Exam:         Awake and alert, sleeping prone, patient extremely frustrated with persistent pain         Neck - supple         Back: Inc - C/D/I         Bilateral Upper Extremities:             Good Motor Strength             Sensation intact         Bilateral Lower Extremities  	(+) R SLR though 45degrees supine, negative L SLR             Good Motor Strength             Senation intact             MRI:  Patient with retained disc material centrally at L5/S1, good decompression of L4/5.  MRI pelvis negative      LABS:                                   14.8   6.88  )-----------( 263      ( 22 Mar 2018 12:06 )             42.7     03-22    137  |  104  |  22  ----------------------------<  92  3.9   |  24  |  0.86    Ca    8.7      22 Mar 2018 12:06    PT/INR - ( 22 Mar 2018 12:06 )   PT: 11.0 sec;   INR: 1.02 ratio

## 2018-03-23 NOTE — PROGRESS NOTE ADULT - SUBJECTIVE AND OBJECTIVE BOX
Pt still c/o LBP with radiation down RLE.  He is frustrated with persistent pain, no new complaints.  NS second opinion noted and appreciated.  Pt had 2 BMs.    Vital Signs Last 24 Hrs  T(C): 37.1 (22 Mar 2018 23:20), Max: 37.1 (22 Mar 2018 23:20)  T(F): 98.7 (22 Mar 2018 23:20), Max: 98.7 (22 Mar 2018 23:20)  HR: 87 (22 Mar 2018 23:20) (66 - 93)  BP: 105/63 (22 Mar 2018 23:20) (92/57 - 114/30)  BP(mean): --  RR: 16 (22 Mar 2018 23:20) (12 - 23)  SpO2: 97% (22 Mar 2018 23:20) (96% - 98%)    Daily     Daily     I&O's Detail      CAPILLARY BLOOD GLUCOSE                                          14.8   6.88  )-----------( 263      ( 22 Mar 2018 12:06 )             42.7       03-22    137  |  104  |  22  ----------------------------<  92  3.9   |  24  |  0.86    Ca    8.7      22 Mar 2018 12:06        PT/INR - ( 22 Mar 2018 12:06 )   PT: 11.0 sec;   INR: 1.02 ratio                         MEDICATIONS  (STANDING):  cyclobenzaprine 10 milliGRAM(s) Oral every 8 hours  dexamethasone  Injectable 2 milliGRAM(s) IV Push every 6 hours  docusate sodium 100 milliGRAM(s) Oral three times a day  heparin  Injectable 5000 Unit(s) SubCutaneous every 12 hours  lactated ringers. 1000 milliLiter(s) (75 mL/Hr) IV Continuous <Continuous>  polyethylene glycol 3350 17 Gram(s) Oral daily  pregabalin 150 milliGRAM(s) Oral three times a day  senna 2 Tablet(s) Oral at bedtime    MEDICATIONS  (PRN):  acetaminophen   Tablet 650 milliGRAM(s) Oral every 6 hours PRN For Temp greater than 38.5 C (101.3 F)  acetaminophen   Tablet. 650 milliGRAM(s) Oral every 6 hours PRN Mild Pain (1 - 3)  diphenhydrAMINE   Injectable 12.5 milliGRAM(s) IV Push every 4 hours PRN Itching  magnesium hydroxide Suspension 30 milliLiter(s) Oral every 12 hours PRN Constipation  morphine  - Injectable 4 milliGRAM(s) IV Push every 3 hours PRN Moderate Pain (4 - 6)  oxyCODONE    5 mG/acetaminophen 325 mG 2 Tablet(s) Oral every 4 hours PRN Moderate Pain

## 2018-03-24 RX ORDER — DEXAMETHASONE 0.5 MG/5ML
2 ELIXIR ORAL EVERY 6 HOURS
Qty: 0 | Refills: 0 | Status: COMPLETED | OUTPATIENT
Start: 2018-03-25 | End: 2018-03-26

## 2018-03-24 RX ORDER — DEXAMETHASONE 0.5 MG/5ML
4 ELIXIR ORAL EVERY 6 HOURS
Qty: 0 | Refills: 0 | Status: COMPLETED | OUTPATIENT
Start: 2018-03-25 | End: 2018-03-25

## 2018-03-24 RX ORDER — DEXAMETHASONE 0.5 MG/5ML
6 ELIXIR ORAL EVERY 6 HOURS
Qty: 0 | Refills: 0 | Status: COMPLETED | OUTPATIENT
Start: 2018-03-24 | End: 2018-03-24

## 2018-03-24 RX ADMIN — Medication 6 MILLIGRAM(S): at 17:01

## 2018-03-24 RX ADMIN — HEPARIN SODIUM 5000 UNIT(S): 5000 INJECTION INTRAVENOUS; SUBCUTANEOUS at 17:01

## 2018-03-24 RX ADMIN — Medication 4 MILLIGRAM(S): at 23:51

## 2018-03-24 RX ADMIN — Medication 650 MILLIGRAM(S): at 11:27

## 2018-03-24 RX ADMIN — Medication 100 MILLIGRAM(S): at 21:04

## 2018-03-24 RX ADMIN — HEPARIN SODIUM 5000 UNIT(S): 5000 INJECTION INTRAVENOUS; SUBCUTANEOUS at 05:57

## 2018-03-24 RX ADMIN — Medication 150 MILLIGRAM(S): at 05:57

## 2018-03-24 RX ADMIN — Medication 150 MILLIGRAM(S): at 13:02

## 2018-03-24 RX ADMIN — Medication 650 MILLIGRAM(S): at 11:01

## 2018-03-24 RX ADMIN — CYCLOBENZAPRINE HYDROCHLORIDE 10 MILLIGRAM(S): 10 TABLET, FILM COATED ORAL at 05:57

## 2018-03-24 RX ADMIN — Medication 2 MILLIGRAM(S): at 11:00

## 2018-03-24 RX ADMIN — Medication 150 MILLIGRAM(S): at 21:04

## 2018-03-24 RX ADMIN — Medication 2 MILLIGRAM(S): at 05:57

## 2018-03-24 RX ADMIN — CYCLOBENZAPRINE HYDROCHLORIDE 10 MILLIGRAM(S): 10 TABLET, FILM COATED ORAL at 13:02

## 2018-03-24 RX ADMIN — SENNA PLUS 2 TABLET(S): 8.6 TABLET ORAL at 21:04

## 2018-03-24 RX ADMIN — Medication 650 MILLIGRAM(S): at 21:05

## 2018-03-24 RX ADMIN — Medication 6 MILLIGRAM(S): at 13:02

## 2018-03-24 RX ADMIN — CYCLOBENZAPRINE HYDROCHLORIDE 10 MILLIGRAM(S): 10 TABLET, FILM COATED ORAL at 21:04

## 2018-03-24 NOTE — PROGRESS NOTE ADULT - SUBJECTIVE AND OBJECTIVE BOX
Chief Complaint: R leg pain    HPI:  Patient with persistent complaints of R leg pain similar to preop. Tried PT/OOB yesterday without success. (+) BM IV steroids continue.    3/20/18 Patient with continued complaints of severe R sciatica. IV steroid taper finished  3./20/18 Returned tonight to review MRI. Patient with slight improvement of R sciatica  3/21/19 Patient with marked persistent R leg pain. No benefit with steroids nor increased Lyrica dosing. Markedly frustrated. Pain worse with sitting  3/22/18 Patient with persistent sciatica. Awaiting pelvic MRI  3/23/18 Patient with persistent pain. Second opinion appreciated  3/24/18 Patient with marked R sciatica-somewhat worse this AM    MEDICATIONS  (STANDING):  cyclobenzaprine 10 milliGRAM(s) Oral every 8 hours  dexamethasone  Injectable 4 milliGRAM(s) IV Push every 6 hours  dexamethasone  Injectable 2 milliGRAM(s) IV Push every 6 hours  docusate sodium 100 milliGRAM(s) Oral three times a day  heparin  Injectable 5000 Unit(s) SubCutaneous every 12 hours  polyethylene glycol 3350 17 Gram(s) Oral daily  senna 2 Tablet(s) Oral at bedtime    MEDICATIONS  (PRN):  acetaminophen   Tablet 650 milliGRAM(s) Oral every 6 hours PRN For Temp greater than 38.5 C (101.3 F)  acetaminophen   Tablet. 650 milliGRAM(s) Oral every 6 hours PRN Mild Pain (1 - 3)  diphenhydrAMINE   Injectable 12.5 milliGRAM(s) IV Push every 4 hours PRN Itching  magnesium hydroxide Suspension 30 milliLiter(s) Oral every 12 hours PRN Constipation  morphine  - Injectable 4 milliGRAM(s) IV Push every 3 hours PRN Moderate Pain (4 - 6)  oxyCODONE    5 mG/acetaminophen 325 mG 2 Tablet(s) Oral every 4 hours PRN Moderate Pain        OBJECTIVE:     Vital Signs Last 24 Hrs  T(C): 36.5 (23 Mar 2018 23:30), Max: 36.5 (23 Mar 2018 23:30)  T(F): 97.7 (23 Mar 2018 23:30), Max: 97.7 (23 Mar 2018 23:30)  HR: 83 (23 Mar 2018 23:30) (52 - 83)  BP: 94/56 (23 Mar 2018 23:30) (94/56 - 147/76)  BP(mean): --  RR: 16 (23 Mar 2018 23:30) (16 - 16)  SpO2: 95% (23 Mar 2018 23:30) (95% - 100%)    Physical Exam:         Awake and alert, sleeping prone, patient extremely frustrated with persistent pain         Neck - supple         Back: Inc - C/D/I         Bilateral Upper Extremities:             Good Motor Strength             Sensation intact         Bilateral Lower Extremities  	(+) R SLR though 30 degrees supine, negative L SLR             Good Motor Strength except slight weakness R EHL             Senation intact             MRI:  Patient with retained disc material centrally at L5/S1, good decompression of L4/5.  MRI pelvis negative      LABS:                                   14.8   6.88  )-----------( 263      ( 22 Mar 2018 12:06 )             42.7     03-22    137  |  104  |  22  ----------------------------<  92  3.9   |  24  |  0.86    Ca    8.7      22 Mar 2018 12:06    PT/INR - ( 22 Mar 2018 12:06 )   PT: 11.0 sec;   INR: 1.02 ratio

## 2018-03-24 NOTE — PROGRESS NOTE ADULT - ASSESSMENT
A/P :  53y Male s/p lami/discectomy  -    Continue pain management and continue Lyrica to 150 mg TID  -    Restart steroid taper for weekend  -    OOB/PT  -    Discussed options with patient. Patient candidate for further surgery.  -    OR available Tuesday.   -    Monitor progress  -    Will discuss with Dr. Sher and Srikanth

## 2018-03-24 NOTE — PROGRESS NOTE ADULT - ASSESSMENT
Pt is a 53 y/o male with no significant past medical history who few weeks ago developed LBP with radiation down his RLE s/p excision HNP L4-5 and L5-S1 on 3/13/18. He did well following surgery and was discharged home.  He was admitted with recurrent increased LBP and well as constipation, medicine consult called for comanagement.      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   Pt wants to proceed with further surgery with Dr Duke team which is tentatively scheduled for Tuesday.     * Constipation-due to narcotics, continue colace, Miralax, Dulcolax  * Disp-OOB, ambulate   * Proph- heparin   * Comm- d/w pt , PT'S FATHER AND RN

## 2018-03-24 NOTE — PROGRESS NOTE ADULT - SUBJECTIVE AND OBJECTIVE BOX
3/24/18: No cp, sob, n/v/f/c; had a good amount of right lower back pain last night, no other complaints  	  ROS: AS PER HPI OTHERWISE ALL SYSTEMS REVIEWED AND ARE NEGATIVE    Vital Signs Last 24 Hrs  T(C): 37 (24 Mar 2018 12:02), Max: 37 (24 Mar 2018 12:02)  T(F): 98.6 (24 Mar 2018 12:02), Max: 98.6 (24 Mar 2018 12:02)  HR: 90 (24 Mar 2018 12:02) (52 - 90)  BP: 107/60 (24 Mar 2018 12:02) (94/56 - 147/76)  BP(mean): --  RR: 18 (24 Mar 2018 12:02) (16 - 18)  SpO2: 97% (24 Mar 2018 12:02) (95% - 100%)    GEN: A and O, NAD,  mood stable  HEENT:   NC/AT, EOMI, no oropharyngeal lesions,    NECK:   supple    CV:  +S1, +S2, regular, no murmurs or rubs    RESP:   lungs clear to auscultation bilaterally, no wheezing, rales, rhonchi, good air entry bilaterally    GI:  abdomen soft, non-tender, non-distended, normal BS, no abdominal masses, no palpable masses    BACK: LOWER BACK TENDERNESS, SURGICAL SCAR HEALING    RECTAL:  not examined    :  not examined    MSK:   normal muscle tone, no atrophy, no rigidity, no contractions    EXT:   no clubbing, no cyanosis, no edema, no calf pain, swelling or erythema    VASCULAR:  pulses equal and symmetric in the upper and lower extremities    NEURO:  AAOX3, no focal neurological deficits, follows all commands, able to move extremities spontaneously    SKIN:  no ulcers, lesions or rashes      NO NEW LABS                                        14.8   6.88  )-----------( 263      ( 22 Mar 2018 12:06 )             42.7       03-22    137  |  104  |  22  ----------------------------<  92  3.9   |  24  |  0.86    Ca    8.7      22 Mar 2018 12:06        PT/INR - ( 22 Mar 2018 12:06 )   PT: 11.0 sec;   INR: 1.02 ratio                       MEDICATIONS  (STANDING):  cyclobenzaprine 10 milliGRAM(s) Oral every 8 hours  dexamethasone  Injectable 2 milliGRAM(s) IV Push every 6 hours  docusate sodium 100 milliGRAM(s) Oral three times a day  heparin  Injectable 5000 Unit(s) SubCutaneous every 12 hours  lactated ringers. 1000 milliLiter(s) (75 mL/Hr) IV Continuous <Continuous>  polyethylene glycol 3350 17 Gram(s) Oral daily  pregabalin 150 milliGRAM(s) Oral three times a day  senna 2 Tablet(s) Oral at bedtime    MEDICATIONS  (PRN):  acetaminophen   Tablet 650 milliGRAM(s) Oral every 6 hours PRN For Temp greater than 38.5 C (101.3 F)  acetaminophen   Tablet. 650 milliGRAM(s) Oral every 6 hours PRN Mild Pain (1 - 3)  diphenhydrAMINE   Injectable 12.5 milliGRAM(s) IV Push every 4 hours PRN Itching  magnesium hydroxide Suspension 30 milliLiter(s) Oral every 12 hours PRN Constipation  morphine  - Injectable 4 milliGRAM(s) IV Push every 3 hours PRN Moderate Pain (4 - 6)  oxyCODONE    5 mG/acetaminophen 325 mG 2 Tablet(s) Oral every 4 hours PRN Moderate Pain

## 2018-03-25 RX ORDER — ALPRAZOLAM 0.25 MG
0.25 TABLET ORAL
Qty: 0 | Refills: 0 | Status: DISCONTINUED | OUTPATIENT
Start: 2018-03-25 | End: 2018-03-27

## 2018-03-25 RX ADMIN — Medication 150 MILLIGRAM(S): at 13:21

## 2018-03-25 RX ADMIN — Medication 150 MILLIGRAM(S): at 21:01

## 2018-03-25 RX ADMIN — Medication 4 MILLIGRAM(S): at 06:38

## 2018-03-25 RX ADMIN — Medication 650 MILLIGRAM(S): at 21:01

## 2018-03-25 RX ADMIN — Medication 100 MILLIGRAM(S): at 06:38

## 2018-03-25 RX ADMIN — Medication 650 MILLIGRAM(S): at 13:53

## 2018-03-25 RX ADMIN — CYCLOBENZAPRINE HYDROCHLORIDE 10 MILLIGRAM(S): 10 TABLET, FILM COATED ORAL at 13:21

## 2018-03-25 RX ADMIN — HEPARIN SODIUM 5000 UNIT(S): 5000 INJECTION INTRAVENOUS; SUBCUTANEOUS at 06:38

## 2018-03-25 RX ADMIN — HEPARIN SODIUM 5000 UNIT(S): 5000 INJECTION INTRAVENOUS; SUBCUTANEOUS at 17:00

## 2018-03-25 RX ADMIN — CYCLOBENZAPRINE HYDROCHLORIDE 10 MILLIGRAM(S): 10 TABLET, FILM COATED ORAL at 21:01

## 2018-03-25 RX ADMIN — Medication 0.25 MILLIGRAM(S): at 21:01

## 2018-03-25 RX ADMIN — Medication 650 MILLIGRAM(S): at 13:22

## 2018-03-25 RX ADMIN — CYCLOBENZAPRINE HYDROCHLORIDE 10 MILLIGRAM(S): 10 TABLET, FILM COATED ORAL at 06:38

## 2018-03-25 RX ADMIN — Medication 150 MILLIGRAM(S): at 06:38

## 2018-03-25 RX ADMIN — Medication 2 MILLIGRAM(S): at 17:00

## 2018-03-25 RX ADMIN — Medication 2 MILLIGRAM(S): at 23:44

## 2018-03-25 RX ADMIN — Medication 2 MILLIGRAM(S): at 11:44

## 2018-03-25 NOTE — PROGRESS NOTE ADULT - ASSESSMENT
Pt is a 53 y/o male with no significant past medical history who few weeks ago developed LBP with radiation down his RLE s/p excision HNP L4-5 and L5-S1 on 3/13/18. He did well following surgery and was discharged home.  He was admitted with recurrent increased LBP and well as constipation, medicine consult called for comanagement.      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   Pt wants to proceed with further surgery with Dr Duke team which is tentatively scheduled for Tuesday.   Check h/h electrolyte sin am, coags stable recently  * Constipation-due to narcotics, continue colace, Miralax, Dulcolax, now resolving  * Disp-OOB, ambulate   * Proph- heparin   * Comm- d/w pt , rn

## 2018-03-25 NOTE — PROGRESS NOTE ADULT - SUBJECTIVE AND OBJECTIVE BOX
Chief Complaint: R leg pain    HPI:  Patient with persistent complaints of R leg pain similar to preop. Tried PT/OOB yesterday without success. (+) BM IV steroids continue.    3/20/18 Patient with continued complaints of severe R sciatica. IV steroid taper finished  3./20/18 Returned tonight to review MRI. Patient with slight improvement of R sciatica  3/21/19 Patient with marked persistent R leg pain. No benefit with steroids nor increased Lyrica dosing. Markedly frustrated. Pain worse with sitting  3/22/18 Patient with persistent sciatica. Awaiting pelvic MRI  3/23/18 Patient with persistent pain. Second opinion appreciated  3/24/18 Patient with marked R sciatica-somewhat worse this AM  3/25/18 Patient has persistent pain.    MEDICATIONS  (STANDING):  cyclobenzaprine 10 milliGRAM(s) Oral every 8 hours  dexamethasone  Injectable 4 milliGRAM(s) IV Push every 6 hours  dexamethasone  Injectable 2 milliGRAM(s) IV Push every 6 hours  docusate sodium 100 milliGRAM(s) Oral three times a day  heparin  Injectable 5000 Unit(s) SubCutaneous every 12 hours  polyethylene glycol 3350 17 Gram(s) Oral daily  senna 2 Tablet(s) Oral at bedtime    MEDICATIONS  (PRN):  acetaminophen   Tablet 650 milliGRAM(s) Oral every 6 hours PRN For Temp greater than 38.5 C (101.3 F)  acetaminophen   Tablet. 650 milliGRAM(s) Oral every 6 hours PRN Mild Pain (1 - 3)  diphenhydrAMINE   Injectable 12.5 milliGRAM(s) IV Push every 4 hours PRN Itching  magnesium hydroxide Suspension 30 milliLiter(s) Oral every 12 hours PRN Constipation  morphine  - Injectable 4 milliGRAM(s) IV Push every 3 hours PRN Moderate Pain (4 - 6)  oxyCODONE    5 mG/acetaminophen 325 mG 2 Tablet(s) Oral every 4 hours PRN Moderate Pain        OBJECTIVE:         Physical Exam:         Awake and alert, sleeping prone, patient extremely frustrated with persistent pain         Neck - supple         Back: Inc - C/D/I         Bilateral Upper Extremities:             Good Motor Strength             Sensation intact         Bilateral Lower Extremities  	(+) R SLR though 30 degrees supine, negative L SLR             Good Motor Strength except slight weakness R EHL             Senation intact             MRI:  Patient with retained disc material centrally at L5/S1, good decompression of L4/5.  MRI pelvis negative      LABS:                                   14.8   6.88  )-----------( 263      ( 22 Mar 2018 12:06 )             42.7     03-22    137  |  104  |  22  ----------------------------<  92  3.9   |  24  |  0.86    Ca    8.7      22 Mar 2018 12:06    PT/INR - ( 22 Mar 2018 12:06 )   PT: 11.0 sec;   INR: 1.02 ratio

## 2018-03-25 NOTE — PROGRESS NOTE ADULT - ASSESSMENT
A/P :  53y Male s/p lami/discectomy  -    Continue pain management and continue Lyrica to 150 mg TID  -    Continue steroid taper for weekend  -    OOB/PT  -    Discussed options with patient. Patient candidate for further surgery.  -    OR available Tuesday.   -    Monitor progress  -    Will discuss with Dr. Sher and Srikanth

## 2018-03-25 NOTE — PROGRESS NOTE ADULT - SUBJECTIVE AND OBJECTIVE BOX
3/24/18: No cp, sob, n/v/f/c; had a good amount of right lower back pain last night, no other complaints  3/25/18:  No cp, sob, n/v/f/c; back pain with radiation down the right leg, numbness to the right foot; had 2 bms  	  ROS: AS PER HPI OTHERWISE ALL SYSTEMS REVIEWED AND ARE NEGATIVE    Vital Signs Last 24 Hrs  T(C): 36.8 (25 Mar 2018 13:01), Max: 37.1 (24 Mar 2018 17:24)  T(F): 98.3 (25 Mar 2018 13:01), Max: 98.8 (24 Mar 2018 17:24)  HR: 88 (25 Mar 2018 13:01) (84 - 94)  BP: 102/64 (25 Mar 2018 13:01) (102/64 - 107/69)  BP(mean): --  RR: 16 (25 Mar 2018 13:01) (16 - 18)  SpO2: 98% (25 Mar 2018 13:01) (96% - 98%)    GEN: A and O, NAD,  mood stable  HEENT:   NC/AT, EOMI, no oropharyngeal lesions,    NECK:   supple    CV:  +S1, +S2, regular, no murmurs or rubs    RESP:   lungs clear to auscultation bilaterally, no wheezing, rales, rhonchi, good air entry bilaterally    GI:  abdomen soft, non-tender, non-distended, normal BS, no abdominal masses, no palpable masses    BACK: LOWER BACK TENDERNESS, SURGICAL SCAR HEALING    RECTAL:  not examined    :  not examined    MSK:   normal muscle tone, no atrophy, no rigidity, no contractions    EXT:   no clubbing, no cyanosis, no edema, no calf pain, swelling or erythema    VASCULAR:  pulses equal and symmetric in the upper and lower extremities    NEURO:  AAOX3, no focal neurological deficits, follows all commands, able to move extremities spontaneously    SKIN:  no ulcers, lesions or rashes      NO NEW LABS                                        14.8   6.88  )-----------( 263      ( 22 Mar 2018 12:06 )             42.7       03-22    137  |  104  |  22  ----------------------------<  92  3.9   |  24  |  0.86    Ca    8.7      22 Mar 2018 12:06        PT/INR - ( 22 Mar 2018 12:06 )   PT: 11.0 sec;   INR: 1.02 ratio                 MEDICATIONS  (STANDING):  cyclobenzaprine 10 milliGRAM(s) Oral every 8 hours  dexamethasone  Injectable 2 milliGRAM(s) IV Push every 6 hours  docusate sodium 100 milliGRAM(s) Oral three times a day  heparin  Injectable 5000 Unit(s) SubCutaneous every 12 hours  polyethylene glycol 3350 17 Gram(s) Oral daily  pregabalin 150 milliGRAM(s) Oral three times a day  senna 2 Tablet(s) Oral at bedtime    MEDICATIONS  (PRN):  acetaminophen   Tablet 650 milliGRAM(s) Oral every 6 hours PRN For Temp greater than 38.5 C (101.3 F)  acetaminophen   Tablet. 650 milliGRAM(s) Oral every 6 hours PRN Mild Pain (1 - 3)  ALPRAZolam 0.25 milliGRAM(s) Oral four times a day PRN anxiety  diphenhydrAMINE   Injectable 12.5 milliGRAM(s) IV Push every 4 hours PRN Itching  magnesium hydroxide Suspension 30 milliLiter(s) Oral every 12 hours PRN Constipation

## 2018-03-26 DIAGNOSIS — M54.5 LOW BACK PAIN: ICD-10-CM

## 2018-03-26 LAB
ANION GAP SERPL CALC-SCNC: 10 MMOL/L — SIGNIFICANT CHANGE UP (ref 5–17)
BLD GP AB SCN SERPL QL: SIGNIFICANT CHANGE UP
BUN SERPL-MCNC: 25 MG/DL — HIGH (ref 7–23)
CALCIUM SERPL-MCNC: 9.1 MG/DL — SIGNIFICANT CHANGE UP (ref 8.5–10.1)
CHLORIDE SERPL-SCNC: 101 MMOL/L — SIGNIFICANT CHANGE UP (ref 96–108)
CO2 SERPL-SCNC: 29 MMOL/L — SIGNIFICANT CHANGE UP (ref 22–31)
CREAT SERPL-MCNC: 1.06 MG/DL — SIGNIFICANT CHANGE UP (ref 0.5–1.3)
GLUCOSE SERPL-MCNC: 102 MG/DL — HIGH (ref 70–99)
HCT VFR BLD CALC: 43.5 % — SIGNIFICANT CHANGE UP (ref 39–50)
HGB BLD-MCNC: 14.6 G/DL — SIGNIFICANT CHANGE UP (ref 13–17)
MCHC RBC-ENTMCNC: 30.3 PG — SIGNIFICANT CHANGE UP (ref 27–34)
MCHC RBC-ENTMCNC: 33.6 GM/DL — SIGNIFICANT CHANGE UP (ref 32–36)
MCV RBC AUTO: 90.2 FL — SIGNIFICANT CHANGE UP (ref 80–100)
NRBC # BLD: 0 /100 WBCS — SIGNIFICANT CHANGE UP (ref 0–0)
PLATELET # BLD AUTO: 273 K/UL — SIGNIFICANT CHANGE UP (ref 150–400)
POTASSIUM SERPL-MCNC: 4.4 MMOL/L — SIGNIFICANT CHANGE UP (ref 3.5–5.3)
POTASSIUM SERPL-SCNC: 4.4 MMOL/L — SIGNIFICANT CHANGE UP (ref 3.5–5.3)
RBC # BLD: 4.82 M/UL — SIGNIFICANT CHANGE UP (ref 4.2–5.8)
RBC # FLD: 13.5 % — SIGNIFICANT CHANGE UP (ref 10.3–14.5)
SODIUM SERPL-SCNC: 140 MMOL/L — SIGNIFICANT CHANGE UP (ref 135–145)
TYPE + AB SCN PNL BLD: SIGNIFICANT CHANGE UP
WBC # BLD: 13.95 K/UL — HIGH (ref 3.8–10.5)
WBC # FLD AUTO: 13.95 K/UL — HIGH (ref 3.8–10.5)

## 2018-03-26 RX ORDER — SODIUM CHLORIDE 9 MG/ML
1000 INJECTION, SOLUTION INTRAVENOUS
Qty: 0 | Refills: 0 | Status: DISCONTINUED | OUTPATIENT
Start: 2018-03-26 | End: 2018-03-27

## 2018-03-26 RX ORDER — MORPHINE SULFATE 50 MG/1
4 CAPSULE, EXTENDED RELEASE ORAL
Qty: 0 | Refills: 0 | Status: DISCONTINUED | OUTPATIENT
Start: 2018-03-26 | End: 2018-03-27

## 2018-03-26 RX ADMIN — Medication 150 MILLIGRAM(S): at 14:30

## 2018-03-26 RX ADMIN — Medication 150 MILLIGRAM(S): at 21:43

## 2018-03-26 RX ADMIN — CYCLOBENZAPRINE HYDROCHLORIDE 10 MILLIGRAM(S): 10 TABLET, FILM COATED ORAL at 06:37

## 2018-03-26 RX ADMIN — Medication 100 MILLIGRAM(S): at 21:43

## 2018-03-26 RX ADMIN — HEPARIN SODIUM 5000 UNIT(S): 5000 INJECTION INTRAVENOUS; SUBCUTANEOUS at 18:04

## 2018-03-26 RX ADMIN — Medication 2 MILLIGRAM(S): at 06:37

## 2018-03-26 RX ADMIN — CYCLOBENZAPRINE HYDROCHLORIDE 10 MILLIGRAM(S): 10 TABLET, FILM COATED ORAL at 14:30

## 2018-03-26 RX ADMIN — MORPHINE SULFATE 4 MILLIGRAM(S): 50 CAPSULE, EXTENDED RELEASE ORAL at 23:03

## 2018-03-26 RX ADMIN — CYCLOBENZAPRINE HYDROCHLORIDE 10 MILLIGRAM(S): 10 TABLET, FILM COATED ORAL at 21:43

## 2018-03-26 RX ADMIN — MORPHINE SULFATE 4 MILLIGRAM(S): 50 CAPSULE, EXTENDED RELEASE ORAL at 22:34

## 2018-03-26 RX ADMIN — Medication 0.25 MILLIGRAM(S): at 23:48

## 2018-03-26 RX ADMIN — Medication 150 MILLIGRAM(S): at 06:37

## 2018-03-26 RX ADMIN — SENNA PLUS 2 TABLET(S): 8.6 TABLET ORAL at 23:48

## 2018-03-26 RX ADMIN — HEPARIN SODIUM 5000 UNIT(S): 5000 INJECTION INTRAVENOUS; SUBCUTANEOUS at 06:37

## 2018-03-26 NOTE — PROGRESS NOTE ADULT - SUBJECTIVE AND OBJECTIVE BOX
pt is now ~2 weeks post op from hemilaminotomy discectomy rt L45,L5S1  with persistent rt LS radiculopathy w paresthesias  can't bear weight on rt leg without numbness and tingling      nv intact except for perhaps 5-/5 strength of Rt TA  wound is well healed  neg SLR left at 45 deg  + SLR rt at 30 degrees      MRI  improvement of nerve compression from preop MRI  sl residual/recurrent nerve compression RT L5S1,L45  CT scan + DDD L45,L5S1

## 2018-03-26 NOTE — PROGRESS NOTE ADULT - ASSESSMENT
Pt is a 51 y/o male with no significant past medical history who few weeks ago developed LBP with radiation down his RLE s/p excision HNP L4-5 and L5-S1 on 3/13/18. He did well following surgery and was discharged home.  He was admitted with recurrent increased LBP and well as constipation, medicine consult called for comanagement.      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   Pt wants to proceed with further surgery with Dr Duke team which is tentatively scheduled for Tuesday.  Pt has low clinical predictors for intermediate risk surgery, he is medically optimized for his surgery.  * Constipation-improved, continue colace, Miralax, Dulcolax  * Disp-OOB, ambulate   * Proph- heparin (hold after MN)  * Comm- d/w pt in details, all questions answered, RN

## 2018-03-26 NOTE — PROGRESS NOTE ADULT - PROBLEM SELECTOR PLAN 1
lengthy discussion with pt; whereas there is mild nerve compression, pt is very symptomatic and has failed lyrica and steroids as far as conservative care;he is extremely frustrated and would like to have something done;    I have recommended revision laminectomy discectomy at L45, L5S1 with possible fusion with instrumentation.  Risks of anesthesia, infection, neurovascular compromise, failure of procedure to relieve him of his nerve or back pain or possible need for additional surgery has been discussed.  all questions answered    I will try to place him on the schedule for surgery tomorrow if OR time is available

## 2018-03-26 NOTE — PROGRESS NOTE ADULT - SUBJECTIVE AND OBJECTIVE BOX
Pt is still c/o LBP with radiation down his RLE.  No new complaints.    Vital Signs Last 24 Hrs  T(C): 36.8 (25 Mar 2018 23:20), Max: 36.9 (25 Mar 2018 16:55)  T(F): 98.3 (25 Mar 2018 23:20), Max: 98.5 (25 Mar 2018 16:55)  HR: 76 (25 Mar 2018 23:20) (76 - 89)  BP: 108/65 (25 Mar 2018 23:20) (100/68 - 108/65)  BP(mean): --  RR: 16 (25 Mar 2018 23:20) (16 - 16)  SpO2: 99% (25 Mar 2018 23:20) (97% - 99%)    Daily     Daily     I&O's Detail      CAPILLARY BLOOD GLUCOSE                                          14.6   13.95 )-----------( 273      ( 26 Mar 2018 05:54 )             43.5       03-26    140  |  101  |  25<H>  ----------------------------<  102<H>  4.4   |  29  |  1.06    Ca    9.1      26 Mar 2018 05:54                        MEDICATIONS  (STANDING):  cyclobenzaprine 10 milliGRAM(s) Oral every 8 hours  docusate sodium 100 milliGRAM(s) Oral three times a day  heparin  Injectable 5000 Unit(s) SubCutaneous every 12 hours  polyethylene glycol 3350 17 Gram(s) Oral daily  pregabalin 150 milliGRAM(s) Oral three times a day  senna 2 Tablet(s) Oral at bedtime    MEDICATIONS  (PRN):  acetaminophen   Tablet 650 milliGRAM(s) Oral every 6 hours PRN For Temp greater than 38.5 C (101.3 F)  acetaminophen   Tablet. 650 milliGRAM(s) Oral every 6 hours PRN Mild Pain (1 - 3)  ALPRAZolam 0.25 milliGRAM(s) Oral four times a day PRN anxiety  diphenhydrAMINE   Injectable 12.5 milliGRAM(s) IV Push every 4 hours PRN Itching  magnesium hydroxide Suspension 30 milliLiter(s) Oral every 12 hours PRN Constipation

## 2018-03-27 ENCOUNTER — RESULT REVIEW (OUTPATIENT)
Age: 53
End: 2018-03-27

## 2018-03-27 LAB
ANION GAP SERPL CALC-SCNC: 7 MMOL/L — SIGNIFICANT CHANGE UP (ref 5–17)
BASOPHILS # BLD AUTO: 0 K/UL — SIGNIFICANT CHANGE UP (ref 0–0.2)
BASOPHILS NFR BLD AUTO: 0 % — SIGNIFICANT CHANGE UP (ref 0–2)
BUN SERPL-MCNC: 21 MG/DL — SIGNIFICANT CHANGE UP (ref 7–23)
CALCIUM SERPL-MCNC: 7.8 MG/DL — LOW (ref 8.5–10.1)
CHLORIDE SERPL-SCNC: 104 MMOL/L — SIGNIFICANT CHANGE UP (ref 96–108)
CO2 SERPL-SCNC: 26 MMOL/L — SIGNIFICANT CHANGE UP (ref 22–31)
CREAT SERPL-MCNC: 0.76 MG/DL — SIGNIFICANT CHANGE UP (ref 0.5–1.3)
EOSINOPHIL # BLD AUTO: 0 K/UL — SIGNIFICANT CHANGE UP (ref 0–0.5)
EOSINOPHIL NFR BLD AUTO: 0 % — SIGNIFICANT CHANGE UP (ref 0–6)
GLUCOSE SERPL-MCNC: 96 MG/DL — SIGNIFICANT CHANGE UP (ref 70–99)
HCT VFR BLD CALC: 38.2 % — LOW (ref 39–50)
HGB BLD-MCNC: 12.9 G/DL — LOW (ref 13–17)
LYMPHOCYTES # BLD AUTO: 1.32 K/UL — SIGNIFICANT CHANGE UP (ref 1–3.3)
LYMPHOCYTES # BLD AUTO: 9 % — LOW (ref 13–44)
MCHC RBC-ENTMCNC: 31.1 PG — SIGNIFICANT CHANGE UP (ref 27–34)
MCHC RBC-ENTMCNC: 33.8 GM/DL — SIGNIFICANT CHANGE UP (ref 32–36)
MCV RBC AUTO: 92 FL — SIGNIFICANT CHANGE UP (ref 80–100)
MONOCYTES # BLD AUTO: 0.15 K/UL — SIGNIFICANT CHANGE UP (ref 0–0.9)
MONOCYTES NFR BLD AUTO: 1 % — LOW (ref 2–14)
NEUTROPHILS # BLD AUTO: 13.18 K/UL — HIGH (ref 1.8–7.4)
NEUTROPHILS NFR BLD AUTO: 83 % — HIGH (ref 43–77)
NEUTS BAND # BLD: 7 % — SIGNIFICANT CHANGE UP (ref 0–8)
NRBC # BLD: 0 /100 — SIGNIFICANT CHANGE UP (ref 0–0)
NRBC # BLD: SIGNIFICANT CHANGE UP /100 WBCS (ref 0–0)
PLAT MORPH BLD: NORMAL — SIGNIFICANT CHANGE UP
PLATELET # BLD AUTO: 165 K/UL — SIGNIFICANT CHANGE UP (ref 150–400)
POTASSIUM SERPL-MCNC: 4.1 MMOL/L — SIGNIFICANT CHANGE UP (ref 3.5–5.3)
POTASSIUM SERPL-SCNC: 4.1 MMOL/L — SIGNIFICANT CHANGE UP (ref 3.5–5.3)
RBC # BLD: 4.15 M/UL — LOW (ref 4.2–5.8)
RBC # FLD: 13.9 % — SIGNIFICANT CHANGE UP (ref 10.3–14.5)
RBC BLD AUTO: NORMAL — SIGNIFICANT CHANGE UP
SODIUM SERPL-SCNC: 137 MMOL/L — SIGNIFICANT CHANGE UP (ref 135–145)
WBC # BLD: 14.64 K/UL — HIGH (ref 3.8–10.5)
WBC # FLD AUTO: 14.64 K/UL — HIGH (ref 3.8–10.5)

## 2018-03-27 PROCEDURE — 88304 TISSUE EXAM BY PATHOLOGIST: CPT | Mod: 26

## 2018-03-27 RX ORDER — ONDANSETRON 8 MG/1
4 TABLET, FILM COATED ORAL EVERY 6 HOURS
Qty: 0 | Refills: 0 | Status: DISCONTINUED | OUTPATIENT
Start: 2018-03-27 | End: 2018-03-29

## 2018-03-27 RX ORDER — ONDANSETRON 8 MG/1
4 TABLET, FILM COATED ORAL ONCE
Qty: 0 | Refills: 0 | Status: DISCONTINUED | OUTPATIENT
Start: 2018-03-27 | End: 2018-03-27

## 2018-03-27 RX ORDER — OXYCODONE HYDROCHLORIDE 5 MG/1
10 TABLET ORAL EVERY 6 HOURS
Qty: 0 | Refills: 0 | Status: DISCONTINUED | OUTPATIENT
Start: 2018-03-27 | End: 2018-03-27

## 2018-03-27 RX ORDER — HYDROMORPHONE HYDROCHLORIDE 2 MG/ML
0.5 INJECTION INTRAMUSCULAR; INTRAVENOUS; SUBCUTANEOUS
Qty: 0 | Refills: 0 | Status: DISCONTINUED | OUTPATIENT
Start: 2018-03-27 | End: 2018-03-29

## 2018-03-27 RX ORDER — FENTANYL CITRATE 50 UG/ML
50 INJECTION INTRAVENOUS
Qty: 0 | Refills: 0 | Status: DISCONTINUED | OUTPATIENT
Start: 2018-03-27 | End: 2018-03-27

## 2018-03-27 RX ORDER — CEFAZOLIN SODIUM 1 G
2000 VIAL (EA) INJECTION EVERY 8 HOURS
Qty: 0 | Refills: 0 | Status: DISCONTINUED | OUTPATIENT
Start: 2018-03-27 | End: 2018-03-28

## 2018-03-27 RX ORDER — NALOXONE HYDROCHLORIDE 4 MG/.1ML
0.1 SPRAY NASAL
Qty: 0 | Refills: 0 | Status: DISCONTINUED | OUTPATIENT
Start: 2018-03-27 | End: 2018-03-29

## 2018-03-27 RX ORDER — POLYETHYLENE GLYCOL 3350 17 G/17G
17 POWDER, FOR SOLUTION ORAL DAILY
Qty: 0 | Refills: 0 | Status: DISCONTINUED | OUTPATIENT
Start: 2018-03-27 | End: 2018-03-31

## 2018-03-27 RX ORDER — HYDROMORPHONE HYDROCHLORIDE 2 MG/ML
1 INJECTION INTRAMUSCULAR; INTRAVENOUS; SUBCUTANEOUS
Qty: 0 | Refills: 0 | Status: DISCONTINUED | OUTPATIENT
Start: 2018-03-27 | End: 2018-03-31

## 2018-03-27 RX ORDER — HYDROMORPHONE HYDROCHLORIDE 2 MG/ML
30 INJECTION INTRAMUSCULAR; INTRAVENOUS; SUBCUTANEOUS
Qty: 0 | Refills: 0 | Status: DISCONTINUED | OUTPATIENT
Start: 2018-03-27 | End: 2018-03-29

## 2018-03-27 RX ORDER — SODIUM CHLORIDE 9 MG/ML
1000 INJECTION, SOLUTION INTRAVENOUS
Qty: 0 | Refills: 0 | Status: DISCONTINUED | OUTPATIENT
Start: 2018-03-27 | End: 2018-03-29

## 2018-03-27 RX ORDER — DOCUSATE SODIUM 100 MG
100 CAPSULE ORAL THREE TIMES A DAY
Qty: 0 | Refills: 0 | Status: DISCONTINUED | OUTPATIENT
Start: 2018-03-27 | End: 2018-03-31

## 2018-03-27 RX ORDER — OXYCODONE AND ACETAMINOPHEN 5; 325 MG/1; MG/1
1 TABLET ORAL EVERY 4 HOURS
Qty: 0 | Refills: 0 | Status: DISCONTINUED | OUTPATIENT
Start: 2018-03-27 | End: 2018-03-31

## 2018-03-27 RX ORDER — SODIUM CHLORIDE 9 MG/ML
1000 INJECTION, SOLUTION INTRAVENOUS
Qty: 0 | Refills: 0 | Status: DISCONTINUED | OUTPATIENT
Start: 2018-03-27 | End: 2018-03-27

## 2018-03-27 RX ORDER — ACETAMINOPHEN 500 MG
650 TABLET ORAL EVERY 4 HOURS
Qty: 0 | Refills: 0 | Status: DISCONTINUED | OUTPATIENT
Start: 2018-03-27 | End: 2018-03-31

## 2018-03-27 RX ORDER — CYCLOBENZAPRINE HYDROCHLORIDE 10 MG/1
10 TABLET, FILM COATED ORAL EVERY 8 HOURS
Qty: 0 | Refills: 0 | Status: DISCONTINUED | OUTPATIENT
Start: 2018-03-27 | End: 2018-03-31

## 2018-03-27 RX ORDER — SODIUM CHLORIDE 9 MG/ML
1000 INJECTION INTRAMUSCULAR; INTRAVENOUS; SUBCUTANEOUS ONCE
Qty: 0 | Refills: 0 | Status: COMPLETED | OUTPATIENT
Start: 2018-03-27 | End: 2018-03-27

## 2018-03-27 RX ORDER — OXYCODONE AND ACETAMINOPHEN 5; 325 MG/1; MG/1
2 TABLET ORAL EVERY 4 HOURS
Qty: 0 | Refills: 0 | Status: DISCONTINUED | OUTPATIENT
Start: 2018-03-27 | End: 2018-03-31

## 2018-03-27 RX ADMIN — HYDROMORPHONE HYDROCHLORIDE 30 MILLILITER(S): 2 INJECTION INTRAMUSCULAR; INTRAVENOUS; SUBCUTANEOUS at 19:19

## 2018-03-27 RX ADMIN — Medication 150 MILLIGRAM(S): at 05:57

## 2018-03-27 RX ADMIN — CYCLOBENZAPRINE HYDROCHLORIDE 10 MILLIGRAM(S): 10 TABLET, FILM COATED ORAL at 05:57

## 2018-03-27 RX ADMIN — Medication 0.25 MILLIGRAM(S): at 09:03

## 2018-03-27 RX ADMIN — SODIUM CHLORIDE 100 MILLILITER(S): 9 INJECTION, SOLUTION INTRAVENOUS at 00:36

## 2018-03-27 RX ADMIN — SODIUM CHLORIDE 125 MILLILITER(S): 9 INJECTION, SOLUTION INTRAVENOUS at 22:24

## 2018-03-27 RX ADMIN — HYDROMORPHONE HYDROCHLORIDE 0.5 MILLIGRAM(S): 2 INJECTION INTRAMUSCULAR; INTRAVENOUS; SUBCUTANEOUS at 19:50

## 2018-03-27 RX ADMIN — SODIUM CHLORIDE 75 MILLILITER(S): 9 INJECTION, SOLUTION INTRAVENOUS at 19:49

## 2018-03-27 RX ADMIN — SODIUM CHLORIDE 1000 MILLILITER(S): 9 INJECTION INTRAMUSCULAR; INTRAVENOUS; SUBCUTANEOUS at 22:24

## 2018-03-27 NOTE — PROGRESS NOTE ADULT - SUBJECTIVE AND OBJECTIVE BOX
Pt with uneventful night, still c/o LBP with radiation down his RLE.  Feels anxious about his surgery.  No CP or SOB.    Vital Signs Last 24 Hrs  T(C): 36.8 (26 Mar 2018 23:05), Max: 37.2 (26 Mar 2018 17:05)  T(F): 98.2 (26 Mar 2018 23:05), Max: 98.9 (26 Mar 2018 17:05)  HR: 96 (26 Mar 2018 23:05) (92 - 96)  BP: 92/58 (26 Mar 2018 23:05) (92/58 - 105/64)  BP(mean): --  RR: 16 (26 Mar 2018 23:05) (16 - 16)  SpO2: 98% (26 Mar 2018 23:05) (97% - 99%)    Daily     Daily     I&O's Detail    26 Mar 2018 07:01  -  27 Mar 2018 07:00  --------------------------------------------------------  IN:    Oral Fluid: 240 mL  Total IN: 240 mL    OUT:    Voided: 1 mL  Total OUT: 1 mL    Total NET: 239 mL      27 Mar 2018 07:01  -  27 Mar 2018 10:12  --------------------------------------------------------  IN:  Total IN: 0 mL    OUT:    Voided: 1 mL  Total OUT: 1 mL    Total NET: -1 mL          CAPILLARY BLOOD GLUCOSE                                          14.6   13.95 )-----------( 273      ( 26 Mar 2018 05:54 )             43.5       03-26    140  |  101  |  25<H>  ----------------------------<  102<H>  4.4   |  29  |  1.06    Ca    9.1      26 Mar 2018 05:54                        MEDICATIONS  (STANDING):  cyclobenzaprine 10 milliGRAM(s) Oral every 8 hours  docusate sodium 100 milliGRAM(s) Oral three times a day  lactated ringers. 1000 milliLiter(s) (100 mL/Hr) IV Continuous <Continuous>  polyethylene glycol 3350 17 Gram(s) Oral daily  pregabalin 150 milliGRAM(s) Oral three times a day  senna 2 Tablet(s) Oral at bedtime    MEDICATIONS  (PRN):  acetaminophen   Tablet 650 milliGRAM(s) Oral every 6 hours PRN For Temp greater than 38.5 C (101.3 F)  acetaminophen   Tablet. 650 milliGRAM(s) Oral every 6 hours PRN Mild Pain (1 - 3)  ALPRAZolam 0.25 milliGRAM(s) Oral four times a day PRN anxiety  diphenhydrAMINE   Injectable 12.5 milliGRAM(s) IV Push every 4 hours PRN Itching  magnesium hydroxide Suspension 30 milliLiter(s) Oral every 12 hours PRN Constipation  morphine  - Injectable 4 milliGRAM(s) IV Push every 3 hours PRN Moderate Pain (4 - 6)

## 2018-03-27 NOTE — BRIEF OPERATIVE NOTE - POST-OP DX
Degenerative disc disease, lumbar  03/27/2018    Active  Gil Duke  Lumbar herniated disc  03/27/2018    Active  Gil Duke  Postlaminectomy syndrome of lumbar region  03/27/2018    Active  Gil Duke

## 2018-03-27 NOTE — BRIEF OPERATIVE NOTE - PROCEDURE
<<-----Click on this checkbox to enter Procedure Laminectomy and fusion of lumbar spine with instrumentation  03/27/2018    Active  LAURITA

## 2018-03-27 NOTE — PROGRESS NOTE ADULT - ASSESSMENT
Pt is a 51 y/o male with no significant past medical history who few weeks ago developed LBP with radiation down his RLE s/p excision HNP L4-5 and L5-S1 on 3/13/18. He did well following surgery and was discharged home.  He was admitted with recurrent increased LBP and well as constipation, medicine consult called for comanagement.      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   Pt wants to proceed with further surgery with Dr Duke team which is  scheduled for later today.  Pt has low clinical predictors for intermediate risk surgery, he is medically optimized for his surgery.  * Constipation-continue colace, Miralax, Dulcolax  * Disp-OOB, ambulate   * Proph- heparin on hold for surgery, venodynes  * Comm- d/w pt in details, all questions answered, RN Pt is a 53 y/o male with no significant past medical history who few weeks ago developed LBP with radiation down his RLE s/p excision HNP L4-5 and L5-S1 on 3/13/18. He did well following surgery and was discharged home.  He was admitted with recurrent increased LBP and well as constipation, medicine consult called for comanagement.      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   Pt wants to proceed with further surgery with Dr Duke team which is  scheduled for later today.  Pt has low clinical predictors for intermediate risk surgery, he is medically optimized for his surgery.  * Constipation-continue colace, Miralax, Dulcolax  * Leukocytosis-reactive from recent steroids, monitor for now, pt asymptomatic   * Disp-OOB, ambulate   * Proph- heparin on hold for surgery, venodynes  * Comm- d/w pt in details, all questions answered, RN

## 2018-03-27 NOTE — BRIEF OPERATIVE NOTE - PRE-OP DX
Herniated lumbar disc without myelopathy  03/27/2018    Active  Gil Duke  Postlaminectomy syndrome of lumbar region  03/27/2018    Active  Gil Duke

## 2018-03-28 ENCOUNTER — APPOINTMENT (OUTPATIENT)
Dept: INTERNAL MEDICINE | Facility: CLINIC | Age: 53
End: 2018-03-28

## 2018-03-28 DIAGNOSIS — M96.1 POSTLAMINECTOMY SYNDROME, NOT ELSEWHERE CLASSIFIED: ICD-10-CM

## 2018-03-28 LAB
ANION GAP SERPL CALC-SCNC: 9 MMOL/L — SIGNIFICANT CHANGE UP (ref 5–17)
BASOPHILS # BLD AUTO: 0.03 K/UL — SIGNIFICANT CHANGE UP (ref 0–0.2)
BASOPHILS NFR BLD AUTO: 0.3 % — SIGNIFICANT CHANGE UP (ref 0–2)
BUN SERPL-MCNC: 16 MG/DL — SIGNIFICANT CHANGE UP (ref 7–23)
CALCIUM SERPL-MCNC: 8.2 MG/DL — LOW (ref 8.5–10.1)
CHLORIDE SERPL-SCNC: 101 MMOL/L — SIGNIFICANT CHANGE UP (ref 96–108)
CO2 SERPL-SCNC: 30 MMOL/L — SIGNIFICANT CHANGE UP (ref 22–31)
CREAT SERPL-MCNC: 0.92 MG/DL — SIGNIFICANT CHANGE UP (ref 0.5–1.3)
EOSINOPHIL # BLD AUTO: 0.09 K/UL — SIGNIFICANT CHANGE UP (ref 0–0.5)
EOSINOPHIL NFR BLD AUTO: 0.8 % — SIGNIFICANT CHANGE UP (ref 0–6)
GLUCOSE SERPL-MCNC: 89 MG/DL — SIGNIFICANT CHANGE UP (ref 70–99)
HCT VFR BLD CALC: 37.9 % — LOW (ref 39–50)
HGB BLD-MCNC: 12.6 G/DL — LOW (ref 13–17)
IMM GRANULOCYTES NFR BLD AUTO: 3.6 % — HIGH (ref 0–1.5)
LYMPHOCYTES # BLD AUTO: 1.82 K/UL — SIGNIFICANT CHANGE UP (ref 1–3.3)
LYMPHOCYTES # BLD AUTO: 16.1 % — SIGNIFICANT CHANGE UP (ref 13–44)
MCHC RBC-ENTMCNC: 30.7 PG — SIGNIFICANT CHANGE UP (ref 27–34)
MCHC RBC-ENTMCNC: 33.2 GM/DL — SIGNIFICANT CHANGE UP (ref 32–36)
MCV RBC AUTO: 92.2 FL — SIGNIFICANT CHANGE UP (ref 80–100)
MONOCYTES # BLD AUTO: 0.89 K/UL — SIGNIFICANT CHANGE UP (ref 0–0.9)
MONOCYTES NFR BLD AUTO: 7.9 % — SIGNIFICANT CHANGE UP (ref 2–14)
NEUTROPHILS # BLD AUTO: 8.09 K/UL — HIGH (ref 1.8–7.4)
NEUTROPHILS NFR BLD AUTO: 71.3 % — SIGNIFICANT CHANGE UP (ref 43–77)
NRBC # BLD: 0 /100 WBCS — SIGNIFICANT CHANGE UP (ref 0–0)
PLATELET # BLD AUTO: 153 K/UL — SIGNIFICANT CHANGE UP (ref 150–400)
POTASSIUM SERPL-MCNC: 4.1 MMOL/L — SIGNIFICANT CHANGE UP (ref 3.5–5.3)
POTASSIUM SERPL-SCNC: 4.1 MMOL/L — SIGNIFICANT CHANGE UP (ref 3.5–5.3)
RBC # BLD: 4.11 M/UL — LOW (ref 4.2–5.8)
RBC # FLD: 14.1 % — SIGNIFICANT CHANGE UP (ref 10.3–14.5)
SODIUM SERPL-SCNC: 140 MMOL/L — SIGNIFICANT CHANGE UP (ref 135–145)
WBC # BLD: 11.33 K/UL — HIGH (ref 3.8–10.5)
WBC # FLD AUTO: 11.33 K/UL — HIGH (ref 3.8–10.5)

## 2018-03-28 RX ORDER — CIPROFLOXACIN HCL 0.3 %
1 DROPS OPHTHALMIC (EYE)
Qty: 0 | Refills: 0 | Status: DISCONTINUED | OUTPATIENT
Start: 2018-03-28 | End: 2018-03-31

## 2018-03-28 RX ORDER — ALPRAZOLAM 0.25 MG
0.25 TABLET ORAL EVERY 8 HOURS
Qty: 0 | Refills: 0 | Status: DISCONTINUED | OUTPATIENT
Start: 2018-03-28 | End: 2018-03-31

## 2018-03-28 RX ORDER — MECLIZINE HCL 12.5 MG
25 TABLET ORAL THREE TIMES A DAY
Qty: 0 | Refills: 0 | Status: DISCONTINUED | OUTPATIENT
Start: 2018-03-28 | End: 2018-03-31

## 2018-03-28 RX ADMIN — Medication 1 DROP(S): at 11:19

## 2018-03-28 RX ADMIN — SODIUM CHLORIDE 125 MILLILITER(S): 9 INJECTION, SOLUTION INTRAVENOUS at 15:44

## 2018-03-28 RX ADMIN — CYCLOBENZAPRINE HYDROCHLORIDE 10 MILLIGRAM(S): 10 TABLET, FILM COATED ORAL at 05:22

## 2018-03-28 RX ADMIN — ONDANSETRON 4 MILLIGRAM(S): 8 TABLET, FILM COATED ORAL at 18:21

## 2018-03-28 RX ADMIN — Medication 1 DROP(S): at 15:44

## 2018-03-28 RX ADMIN — Medication 1 DROP(S): at 21:51

## 2018-03-28 RX ADMIN — Medication 100 MILLIGRAM(S): at 09:22

## 2018-03-28 RX ADMIN — CYCLOBENZAPRINE HYDROCHLORIDE 10 MILLIGRAM(S): 10 TABLET, FILM COATED ORAL at 13:34

## 2018-03-28 RX ADMIN — Medication 1 DROP(S): at 09:56

## 2018-03-28 RX ADMIN — Medication 1 DROP(S): at 17:30

## 2018-03-28 RX ADMIN — Medication 0.25 MILLIGRAM(S): at 02:21

## 2018-03-28 RX ADMIN — Medication 25 MILLIGRAM(S): at 09:57

## 2018-03-28 RX ADMIN — Medication 100 MILLIGRAM(S): at 01:46

## 2018-03-28 RX ADMIN — Medication 1 DROP(S): at 13:34

## 2018-03-28 RX ADMIN — Medication 1 DROP(S): at 19:46

## 2018-03-28 RX ADMIN — Medication 1 DROP(S): at 05:22

## 2018-03-28 RX ADMIN — CYCLOBENZAPRINE HYDROCHLORIDE 10 MILLIGRAM(S): 10 TABLET, FILM COATED ORAL at 21:49

## 2018-03-28 RX ADMIN — Medication 1 DROP(S): at 02:50

## 2018-03-28 NOTE — PROGRESS NOTE ADULT - ASSESSMENT
Pt is a 53 y/o male with no significant past medical history who few weeks ago developed LBP with radiation down his RLE s/p excision HNP L4-5 and L5-S1 on 3/13/18. He did well following surgery and was discharged home.  He was admitted with recurrent increased LBP and well as constipation, medicine consult called for comanagement.      * LBP-pt failed all conservative and microdiscectomy.  S/p L4-S1 lami with fusion, continue pain control, PT, encourage use of incentive spirometry.  * Lt Eye Tearing-suspect corneal abrasion.  Add cipro eye drops, monitor  * Vertigo-? due to anesthesia, trial of antivert, monitor, no nystagmus, focal neurological or central finding.  If persists or worsens than consider neuro eval.    * Anxiety-xanax prn  * Constipation-continue colace, Miralax, Dulcolax  * Disp-OOB, ambulate   * Proph- venodynes  * Comm- d/w pt in details, all questions answered, RN, Dr Duke

## 2018-03-28 NOTE — PROGRESS NOTE ADULT - GIT GEN HX ROS MEA POS PC
constipation

## 2018-03-28 NOTE — DIETITIAN INITIAL EVALUATION ADULT. - OTHER INFO
53yo male with no significant PMH developed LBP with radiation down to RLE s/p excision HNP L4-5 and L5-S1 on 3/13/18.  Admitted with recurrent LBP with constipation. Now s/p L4-S1 laminectomy with fusion yesterday now c/o dizziness and Lt eye tearing with redness (suspected corneal abrasion); vertigo due to anesthesia?  Upon visit, pt appears well nourished.  pt endorses good appetite during and PTA.  Recent wt loss 2/2 intentional with change in diet to be healthier.  Pt with wt loss of 34#.  d/w pt importance of adequate PO intake while healing; pt verbalized understanding.  Pt with no questions on diet.  As feasible, advance diet as tolerated to regular and monitor PO intake and wt closely.

## 2018-03-28 NOTE — PROGRESS NOTE ADULT - SUBJECTIVE AND OBJECTIVE BOX
[facility]                                                            Orthopedic Spine Progress Note      POST OPERATIVE DAY #: 1  STATUS POST:           Laminectomy and fusion L4 to sacrum with interbody fusion L4-5 and L5-S1      Pre-Op Dx: Herniated lumbar disc without myelopathy  Postlaminectomy syndrome of lumbar region    Post-Op Dx:  Postlaminectomy syndrome of lumbar region  Degenerative disc disease, lumbar  Lumbar herniated disc    Prin. Procedure:  Laminectomy and fusion L4 to sacrum with interbody fusion L4-5 and L5-S1      SUBJECTIVE: Patient seen and examined.     He complains of dizziness and vertigo today.  He states that his leg pain on the right side is improved.  He is a bit fuzzy and this may be due to anesthesia.    He does complain of lower back pain.  This is not unexpected.    Pain (0-10): 8  Current Pain Management:  [x ] PCA   [ ] Po Analgesics [ ] IM /IV Anagesics     Vital Signs Last 24 Hrs  T(C): 37.3 (28 Mar 2018 11:12), Max: 37.3 (28 Mar 2018 11:12)  T(F): 99.1 (28 Mar 2018 11:12), Max: 99.1 (28 Mar 2018 11:12)  HR: 107 (28 Mar 2018 11:12) (100 - 121)  BP: 114/66 (28 Mar 2018 11:12) (82/54 - 114/66)  BP(mean): --  RR: 16 (28 Mar 2018 11:12) (10 - 18)  SpO2: 96% (28 Mar 2018 11:12) (95% - 98%)  I&O's Detail    27 Mar 2018 07:01  -  28 Mar 2018 07:00  --------------------------------------------------------  IN:    lactated ringers.: 225 mL    Other: 3950 mL  Total IN: 4175 mL    OUT:    Accordian: 425 mL...............left in    Indwelling Catheter - Urethral: 1175 mL    Other: 750 mL    Voided: 1 mL  Total OUT: 2351 mL    Total NET: 1824 mL          OBJECTIVE: He appears a little disoriented today.  He also appears anxious.        Wound /Dressing: Clean and dry    Lumbar: ROM :Stiff on range of motion  Neurological: A/O x   3            Sensation: [ x] intact to light touch  [ ] decreased:          Motor exam: [ x ]          [ x] Upper extremity    Delt      Bicp       Tri      Wrist ext  Wrst Flex       Digit Ext Digit Flex                                     R         5/5        5/5        5/5       5/5            5/5            5/5       5/5          5/5                                     L          5/5        5/5        5/5       5/5            5/5            5/5       5/5          5/5         [x ] Lower ext.     Hip Flx  Hip Ext   Hip Abd  Hip Add Quad   Hamstrg   TA       EHL      GS                              R        5/5        5/5        5/5             5/5        5/5        5/5        5/5     5/5      5/5                              L         5/5        5/5        5/5             5/5        5/5        5/5        5/5     5/5      5/5                                                        [x ] Vascular :             Tension Signs: Positive on the right at 30° and negative on the left at 45          Long Tract Findings:     RADIOLOGY & ADDITIONAL STUDIES:                                               LABS:                        12.6   11.33 )-----------( 153      ( 28 Mar 2018 06:02 )             37.9     03-28    140  |  101  |  16  ----------------------------<  89  4.1   |  30  |  0.92    Ca    8.2<L>      28 Mar 2018 06:02             Wound Culture: Pending    A/P :  53y Male   s/p:  Laminectomy and fusion L4 to sacrum  -    Pain control on PCA  -    DVT ppx: SCDs    -    Abx:  Continues  -    Review labs as indicated     -    Physical Therapy To start today  -    Weight bearing status: full  -    Dispo: Home [ ]     Rehab [x ]

## 2018-03-28 NOTE — DIETITIAN INITIAL EVALUATION ADULT. - ENERGY NEEDS
Ht. 69"        Wt. 75.5 kg          24.5 BMI                  IBW 72.7 kg               Pt is at 104%  IBW

## 2018-03-28 NOTE — PROGRESS NOTE ADULT - PROBLEM SELECTOR PLAN 1
Mobilize patient    Observe with the Antivert.    May need to have PCA discontinued.    Start physical therapy.

## 2018-03-28 NOTE — PROGRESS NOTE ADULT - ASSESSMENT
Status post laminectomy and fusion lumbar spine now with vertigo.    Discussed with Dr. Bourne.  Will start patient on Antivert.    It may be due to the PCA.  Will observe her further and then make a decision today.    May need to stop the PCA.    Mobilize him today.

## 2018-03-28 NOTE — PROGRESS NOTE ADULT - SUBJECTIVE AND OBJECTIVE BOX
Pt is s/p L4-S1 laminectomy with fusion yesterday.  Since surgery c/o dizziness and Lt eye tearing and redness.  Also feels anxious and c/o LBP.    Vital Signs Last 24 Hrs  T(C): 37.1 (28 Mar 2018 01:10), Max: 37.1 (28 Mar 2018 01:10)  T(F): 98.7 (28 Mar 2018 01:10), Max: 98.7 (28 Mar 2018 01:10)  HR: 109 (28 Mar 2018 01:10) (76 - 121)  BP: 90/56 (28 Mar 2018 01:10) (82/54 - 113/73)  BP(mean): --  RR: 16 (28 Mar 2018 01:10) (10 - 18)  SpO2: 98% (28 Mar 2018 01:10) (95% - 99%)    Daily     Daily     I&O's Detail    27 Mar 2018 07:01  -  28 Mar 2018 07:00  --------------------------------------------------------  IN:    lactated ringers.: 225 mL    Other: 3950 mL  Total IN: 4175 mL    OUT:    Accordian: 425 mL    Indwelling Catheter - Urethral: 1175 mL    Other: 750 mL    Voided: 1 mL  Total OUT: 2351 mL    Total NET: 1824 mL          CAPILLARY BLOOD GLUCOSE                                          12.6   11.33 )-----------( 153      ( 28 Mar 2018 06:02 )             37.9       03-28    140  |  101  |  16  ----------------------------<  89  4.1   |  30  |  0.92    Ca    8.2<L>      28 Mar 2018 06:02                    Culture Results:   Testing in progress (03-27 @ 13:01)  Culture Results:   Testing in progress (03-27 @ 13:00)      MEDICATIONS  (STANDING):  ceFAZolin   IVPB 2000 milliGRAM(s) IV Intermittent every 8 hours  cyclobenzaprine 10 milliGRAM(s) Oral every 8 hours  docusate sodium 100 milliGRAM(s) Oral three times a day  HYDROmorphone PCA (1 mG/mL) 30 milliLiter(s) PCA Continuous PCA Continuous  lactated ringers. 1000 milliLiter(s) (125 mL/Hr) IV Continuous <Continuous>  polyethylene glycol 3350 17 Gram(s) Oral daily    MEDICATIONS  (PRN):  acetaminophen   Tablet 650 milliGRAM(s) Oral every 4 hours PRN For Temp greater than 38 C (100.4 F)  acetaminophen   Tablet. 650 milliGRAM(s) Oral every 4 hours PRN Mild Pain (1 - 3)  ALPRAZolam 0.25 milliGRAM(s) Oral every 8 hours PRN anxiety  artificial  tears Solution 1 Drop(s) Both EYES every 2 hours PRN Dry Eyes  HYDROmorphone  Injectable 1 milliGRAM(s) SubCutaneous every 3 hours PRN Severe Pain (7 - 10)  HYDROmorphone PCA (1 mG/mL) Rescue Clinician Bolus 0.5 milliGRAM(s) IV Push every 15 minutes PRN for Pain Scale GREATER THAN 6  naloxone Injectable 0.1 milliGRAM(s) IV Push every 3 minutes PRN For ANY of the following changes in patient status:  A. RR LESS THAN 10 breaths per minute, B. Oxygen saturation LESS THAN 90%, C. Sedation score of 6  ondansetron Injectable 4 milliGRAM(s) IV Push every 6 hours PRN Nausea  oxyCODONE    5 mG/acetaminophen 325 mG 1 Tablet(s) Oral every 4 hours PRN Mild Pain (1 - 3)  oxyCODONE    5 mG/acetaminophen 325 mG 2 Tablet(s) Oral every 4 hours PRN Moderate Pain (4 - 6)

## 2018-03-29 LAB
ANION GAP SERPL CALC-SCNC: 6 MMOL/L — SIGNIFICANT CHANGE UP (ref 5–17)
BASOPHILS # BLD AUTO: 0.01 K/UL — SIGNIFICANT CHANGE UP (ref 0–0.2)
BASOPHILS NFR BLD AUTO: 0.1 % — SIGNIFICANT CHANGE UP (ref 0–2)
BUN SERPL-MCNC: 7 MG/DL — SIGNIFICANT CHANGE UP (ref 7–23)
CALCIUM SERPL-MCNC: 8.2 MG/DL — LOW (ref 8.5–10.1)
CHLORIDE SERPL-SCNC: 101 MMOL/L — SIGNIFICANT CHANGE UP (ref 96–108)
CO2 SERPL-SCNC: 29 MMOL/L — SIGNIFICANT CHANGE UP (ref 22–31)
CREAT SERPL-MCNC: 0.65 MG/DL — SIGNIFICANT CHANGE UP (ref 0.5–1.3)
EOSINOPHIL # BLD AUTO: 0.12 K/UL — SIGNIFICANT CHANGE UP (ref 0–0.5)
EOSINOPHIL NFR BLD AUTO: 0.8 % — SIGNIFICANT CHANGE UP (ref 0–6)
GLUCOSE SERPL-MCNC: 122 MG/DL — HIGH (ref 70–99)
HCT VFR BLD CALC: 36.4 % — LOW (ref 39–50)
HGB BLD-MCNC: 12.4 G/DL — LOW (ref 13–17)
IMM GRANULOCYTES NFR BLD AUTO: 1.5 % — SIGNIFICANT CHANGE UP (ref 0–1.5)
LYMPHOCYTES # BLD AUTO: 1.27 K/UL — SIGNIFICANT CHANGE UP (ref 1–3.3)
LYMPHOCYTES # BLD AUTO: 8.6 % — LOW (ref 13–44)
MCHC RBC-ENTMCNC: 31.3 PG — SIGNIFICANT CHANGE UP (ref 27–34)
MCHC RBC-ENTMCNC: 34.1 GM/DL — SIGNIFICANT CHANGE UP (ref 32–36)
MCV RBC AUTO: 91.9 FL — SIGNIFICANT CHANGE UP (ref 80–100)
MONOCYTES # BLD AUTO: 0.99 K/UL — HIGH (ref 0–0.9)
MONOCYTES NFR BLD AUTO: 6.7 % — SIGNIFICANT CHANGE UP (ref 2–14)
NEUTROPHILS # BLD AUTO: 12.16 K/UL — HIGH (ref 1.8–7.4)
NEUTROPHILS NFR BLD AUTO: 82.3 % — HIGH (ref 43–77)
NRBC # BLD: 0 /100 WBCS — SIGNIFICANT CHANGE UP (ref 0–0)
PLATELET # BLD AUTO: 137 K/UL — LOW (ref 150–400)
POTASSIUM SERPL-MCNC: 4 MMOL/L — SIGNIFICANT CHANGE UP (ref 3.5–5.3)
POTASSIUM SERPL-SCNC: 4 MMOL/L — SIGNIFICANT CHANGE UP (ref 3.5–5.3)
RBC # BLD: 3.96 M/UL — LOW (ref 4.2–5.8)
RBC # FLD: 13.6 % — SIGNIFICANT CHANGE UP (ref 10.3–14.5)
SODIUM SERPL-SCNC: 136 MMOL/L — SIGNIFICANT CHANGE UP (ref 135–145)
SURGICAL PATHOLOGY FINAL REPORT - CH: SIGNIFICANT CHANGE UP
WBC # BLD: 14.77 K/UL — HIGH (ref 3.8–10.5)
WBC # FLD AUTO: 14.77 K/UL — HIGH (ref 3.8–10.5)

## 2018-03-29 RX ORDER — SODIUM CHLORIDE 9 MG/ML
1000 INJECTION INTRAMUSCULAR; INTRAVENOUS; SUBCUTANEOUS ONCE
Qty: 0 | Refills: 0 | Status: COMPLETED | OUTPATIENT
Start: 2018-03-29 | End: 2018-03-29

## 2018-03-29 RX ADMIN — Medication 1 DROP(S): at 22:06

## 2018-03-29 RX ADMIN — Medication 650 MILLIGRAM(S): at 03:41

## 2018-03-29 RX ADMIN — Medication 1 DROP(S): at 18:51

## 2018-03-29 RX ADMIN — Medication 1 DROP(S): at 07:51

## 2018-03-29 RX ADMIN — Medication 1 DROP(S): at 20:12

## 2018-03-29 RX ADMIN — Medication 1 DROP(S): at 00:12

## 2018-03-29 RX ADMIN — OXYCODONE AND ACETAMINOPHEN 2 TABLET(S): 5; 325 TABLET ORAL at 12:24

## 2018-03-29 RX ADMIN — Medication 1 DROP(S): at 14:28

## 2018-03-29 RX ADMIN — Medication 650 MILLIGRAM(S): at 04:29

## 2018-03-29 RX ADMIN — Medication 1 DROP(S): at 09:50

## 2018-03-29 RX ADMIN — Medication 1 DROP(S): at 12:23

## 2018-03-29 RX ADMIN — Medication 100 MILLIGRAM(S): at 22:06

## 2018-03-29 RX ADMIN — Medication 1 DROP(S): at 03:40

## 2018-03-29 RX ADMIN — Medication 100 MILLIGRAM(S): at 14:27

## 2018-03-29 RX ADMIN — Medication 1 DROP(S): at 01:50

## 2018-03-29 RX ADMIN — CYCLOBENZAPRINE HYDROCHLORIDE 10 MILLIGRAM(S): 10 TABLET, FILM COATED ORAL at 14:27

## 2018-03-29 RX ADMIN — Medication 1 DROP(S): at 06:07

## 2018-03-29 RX ADMIN — OXYCODONE AND ACETAMINOPHEN 2 TABLET(S): 5; 325 TABLET ORAL at 18:51

## 2018-03-29 RX ADMIN — CYCLOBENZAPRINE HYDROCHLORIDE 10 MILLIGRAM(S): 10 TABLET, FILM COATED ORAL at 22:06

## 2018-03-29 RX ADMIN — CYCLOBENZAPRINE HYDROCHLORIDE 10 MILLIGRAM(S): 10 TABLET, FILM COATED ORAL at 06:07

## 2018-03-29 RX ADMIN — ONDANSETRON 4 MILLIGRAM(S): 8 TABLET, FILM COATED ORAL at 10:16

## 2018-03-29 RX ADMIN — SODIUM CHLORIDE 1000 MILLILITER(S): 9 INJECTION INTRAMUSCULAR; INTRAVENOUS; SUBCUTANEOUS at 19:16

## 2018-03-29 RX ADMIN — Medication 0.25 MILLIGRAM(S): at 22:06

## 2018-03-29 RX ADMIN — Medication 1 DROP(S): at 17:00

## 2018-03-29 NOTE — PROGRESS NOTE ADULT - SUBJECTIVE AND OBJECTIVE BOX
Pt feels better, less LBP, dizziness resolved.  C/o mild Lt eye pain w/o any vision change.    Vital Signs Last 24 Hrs  T(C): 37.3 (29 Mar 2018 04:44), Max: 37.4 (28 Mar 2018 17:28)  T(F): 99.2 (29 Mar 2018 04:44), Max: 99.4 (29 Mar 2018 03:45)  HR: 106 (29 Mar 2018 04:44) (106 - 110)  BP: 110/65 (29 Mar 2018 04:44) (104/69 - 127/76)  BP(mean): --  RR: 17 (29 Mar 2018 04:44) (16 - 18)  SpO2: 94% (29 Mar 2018 04:44) (94% - 98%)    Daily     Daily Weight in k.5 (28 Mar 2018 13:55)    I&O's Detail    28 Mar 2018 07:01  -  29 Mar 2018 07:00  --------------------------------------------------------  IN:    lactated ringers.: 1000 mL    Oral Fluid: 500 mL  Total IN: 1500 mL    OUT:    Accordian: 25 mL    Indwelling Catheter - Urethral: 2800 mL  Total OUT: 2825 mL    Total NET: -1325 mL          CAPILLARY BLOOD GLUCOSE                                          12.4   14.77 )-----------( 137      ( 29 Mar 2018 06:29 )             36.4       -    136  |  101  |  7   ----------------------------<  122<H>  4.0   |  29  |  0.65    Ca    8.2<L>      29 Mar 2018 06:29                        MEDICATIONS  (STANDING):  ciprofloxacin  0.3% Ophthalmic Solution 1 Drop(s) Left EYE every 2 hours  cyclobenzaprine 10 milliGRAM(s) Oral every 8 hours  docusate sodium 100 milliGRAM(s) Oral three times a day  HYDROmorphone PCA (1 mG/mL) 30 milliLiter(s) PCA Continuous PCA Continuous  lactated ringers. 1000 milliLiter(s) (125 mL/Hr) IV Continuous <Continuous>  polyethylene glycol 3350 17 Gram(s) Oral daily    MEDICATIONS  (PRN):  acetaminophen   Tablet 650 milliGRAM(s) Oral every 4 hours PRN For Temp greater than 38 C (100.4 F)  acetaminophen   Tablet. 650 milliGRAM(s) Oral every 4 hours PRN Mild Pain (1 - 3)  ALPRAZolam 0.25 milliGRAM(s) Oral every 8 hours PRN anxiety  artificial  tears Solution 1 Drop(s) Both EYES every 2 hours PRN Dry Eyes  HYDROmorphone  Injectable 1 milliGRAM(s) SubCutaneous every 3 hours PRN Severe Pain (7 - 10)  HYDROmorphone PCA (1 mG/mL) Rescue Clinician Bolus 0.5 milliGRAM(s) IV Push every 15 minutes PRN for Pain Scale GREATER THAN 6  meclizine 25 milliGRAM(s) Oral three times a day PRN Dizziness  naloxone Injectable 0.1 milliGRAM(s) IV Push every 3 minutes PRN For ANY of the following changes in patient status:  A. RR LESS THAN 10 breaths per minute, B. Oxygen saturation LESS THAN 90%, C. Sedation score of 6  ondansetron Injectable 4 milliGRAM(s) IV Push every 6 hours PRN Nausea  oxyCODONE    5 mG/acetaminophen 325 mG 1 Tablet(s) Oral every 4 hours PRN Mild Pain (1 - 3)  oxyCODONE    5 mG/acetaminophen 325 mG 2 Tablet(s) Oral every 4 hours PRN Moderate Pain (4 - 6)

## 2018-03-29 NOTE — PROGRESS NOTE ADULT - SUBJECTIVE AND OBJECTIVE BOX
Chief Complaint: R leg pain    HPI:  Patient with persistent complaints of R leg pain similar to preop. Tried PT/OOB yesterday without success. (+) BM IV steroids continue.    3/20/18 Patient with continued complaints of severe R sciatica. IV steroid taper finished  3./20/18 Returned tonight to review MRI. Patient with slight improvement of R sciatica  3/21/19 Patient with marked persistent R leg pain. No benefit with steroids nor increased Lyrica dosing. Markedly frustrated. Pain worse with sitting  3/22/18 Patient with persistent sciatica. Awaiting pelvic MRI  3/23/18 Patient with persistent pain. Second opinion appreciated  3/24/18 Patient with marked R sciatica-somewhat worse this AM  3/25/18 Patient has persistent pain.  3/27/18 S/P lami/fusion/TLIF L4-S  3/29/18 Patient with postoperative incisional pain-no radicular pain    MEDICATIONS  (STANDING):  cyclobenzaprine 10 milliGRAM(s) Oral every 8 hours  dexamethasone  Injectable 4 milliGRAM(s) IV Push every 6 hours  dexamethasone  Injectable 2 milliGRAM(s) IV Push every 6 hours  docusate sodium 100 milliGRAM(s) Oral three times a day  heparin  Injectable 5000 Unit(s) SubCutaneous every 12 hours  polyethylene glycol 3350 17 Gram(s) Oral daily  senna 2 Tablet(s) Oral at bedtime    MEDICATIONS  (PRN):  acetaminophen   Tablet 650 milliGRAM(s) Oral every 6 hours PRN For Temp greater than 38.5 C (101.3 F)  acetaminophen   Tablet. 650 milliGRAM(s) Oral every 6 hours PRN Mild Pain (1 - 3)  diphenhydrAMINE   Injectable 12.5 milliGRAM(s) IV Push every 4 hours PRN Itching  magnesium hydroxide Suspension 30 milliLiter(s) Oral every 12 hours PRN Constipation  morphine  - Injectable 4 milliGRAM(s) IV Push every 3 hours PRN Moderate Pain (4 - 6)  oxyCODONE    5 mG/acetaminophen 325 mG 2 Tablet(s) Oral every 4 hours PRN Moderate Pain        OBJECTIVE:     PHYSICAL EXAM:    Vital Signs Last 24 Hrs  T(C): 37.3 (29 Mar 2018 04:44), Max: 37.4 (28 Mar 2018 17:28)  T(F): 99.2 (29 Mar 2018 04:44), Max: 99.4 (29 Mar 2018 03:45)  HR: 108 (29 Mar 2018 11:07) (106 - 110)  BP: 110/65 (29 Mar 2018 04:44) (110/65 - 127/76)  BP(mean): --  RR: 17 (29 Mar 2018 04:44) (16 - 17)  SpO2: 94% (29 Mar 2018 04:44) (94% - 98%)    Awake and alert, comfortable. No significant pain  Tolerating diet  Salcedo with adequate output-to remove  Dressing clean and dry  Hvac with persistent drainage  Neuro stable.    LABS:                                12.4   14.77 )-----------( 137      ( 29 Mar 2018 06:29 )             36.4   03-29    136  |  101  |  7   ----------------------------<  122<H>  4.0   |  29  |  0.65    Ca    8.2<L>      29 Mar 2018 06:29

## 2018-03-29 NOTE — PROGRESS NOTE ADULT - ASSESSMENT
A/P :  53y Male s/p lami/fusion/TLIF L4-S1 on 3/27/18     Plan:  D/C PCA  Increase activity with PT  D/C sparks  Monitor Hvac  Monitor labs

## 2018-03-29 NOTE — PHYSICAL THERAPY INITIAL EVALUATION ADULT - ADDITIONAL COMMENTS
Patient went home post surgery without device.
Patient went home post surgery without device. Patient concerned about stairs into the apartment.

## 2018-03-29 NOTE — PHYSICAL THERAPY INITIAL EVALUATION ADULT - GAIT DISTANCE, PT EVAL
5 feet with RW, return without.  Patient c/o pain at 8/10, refused further ambulation./10 feet
10 feet/patient 's knees buckled, caught himself, chair brought to him.  Had to stand x 1 minute prior to event to drain Salcedo bag.

## 2018-03-29 NOTE — PROGRESS NOTE ADULT - ASSESSMENT
Pt is a 53 y/o male with no significant past medical history who few weeks ago developed LBP with radiation down his RLE s/p excision HNP L4-5 and L5-S1 on 3/13/18. He did well following surgery and was discharged home.  He was admitted with recurrent increased LBP and well as constipation, medicine consult called for comanagement.      * LBP-pt failed all conservative and microdiscectomy.  S/p L4-S1 lami with fusion, doing well, continue pain control, PT, encourage use of incentive spirometry.  * Lt Eye Tearing-suspect corneal abrasion.  Continue cipro eye drops, monitor  * Vertigo-? due to anesthesia, resolved with antivert.  * Anxiety-xanax prn  * Constipation-continue colace, Miralax, Dulcolax  * Disp-OOB, ambulate, d/c planning  * Proph- venodynes while drain in place  * Comm- d/w pt in details, all questions answered, RN

## 2018-03-29 NOTE — PROGRESS NOTE ADULT - EYES COMMENTS
Mildly injected Lt cornea, vision intact, excessive tearing noted w/o obvious discharge, no nystagmus noted
mildly injected Lt cornea w/o any vision change

## 2018-03-29 NOTE — PHYSICAL THERAPY INITIAL EVALUATION ADULT - MODALITIES TREATMENT COMMENTS
Patient returned to bed by request, call bell in reach. Hot packs given for pain control.  Patient instructed that ambulation and movement is best for pain control post surgery.
Patient left OOB in chair with CBIR.  SO at bedside, Instructed to call for assist back to bed/bathroom. RN informed of status.

## 2018-03-29 NOTE — PHYSICAL THERAPY INITIAL EVALUATION ADULT - ACTIVE RANGE OF MOTION EXAMINATION, REHAB EVAL
no Active ROM deficits were identified
+ B hamstring tightness/no Active ROM deficits were identified

## 2018-03-29 NOTE — PHYSICAL THERAPY INITIAL EVALUATION ADULT - DISCHARGE DISPOSITION, PT EVAL
home/when cleared by MD/outpatient PT
ALL vs home if not able to achieve all goals by discharge/rehabilitation facility

## 2018-03-29 NOTE — PHYSICAL THERAPY INITIAL EVALUATION ADULT - GENERAL OBSERVATIONS, REHAB EVAL
Patient received in bed on 2N, c/o LBP at 5/10 at rest.
Patient received in bed on 2N, SO at bedside. +Salcedo, +IV/PCA, +HV, +Flowtrons. Patient anxious about getting up.

## 2018-03-29 NOTE — PHYSICAL THERAPY INITIAL EVALUATION ADULT - PERTINENT HX OF CURRENT PROBLEM, REHAB EVAL
52 yo M admitted c/o LBp and constipation.  Patient s/p recent lumbar lami/fusion,  excision HNP L4-5 and L5-S1 on 3/13/18. He did well following surgery and was discharged home. While at home he developed increased LBP and well as constipation and RLE sharp, shooting pain.
54 yo M admitted c/o LBP and constipation.  Patient s/p recent lumbar laminectomy, excision HNP L4-5 and L5-S1 on 3/13/18. He did well following surgery and was discharged home. While at home he developed increased LBP and well as constipation and RLE sharp, shooting pain. Course: MRI with persistent herniation and DJD of L5-S1, s/p Laminectomy and fusion of lumbar spine with instrumentation .

## 2018-03-30 LAB
ANION GAP SERPL CALC-SCNC: 4 MMOL/L — LOW (ref 5–17)
BASOPHILS # BLD AUTO: 0.01 K/UL — SIGNIFICANT CHANGE UP (ref 0–0.2)
BASOPHILS NFR BLD AUTO: 0.1 % — SIGNIFICANT CHANGE UP (ref 0–2)
BUN SERPL-MCNC: 9 MG/DL — SIGNIFICANT CHANGE UP (ref 7–23)
CALCIUM SERPL-MCNC: 8.2 MG/DL — LOW (ref 8.5–10.1)
CHLORIDE SERPL-SCNC: 103 MMOL/L — SIGNIFICANT CHANGE UP (ref 96–108)
CO2 SERPL-SCNC: 29 MMOL/L — SIGNIFICANT CHANGE UP (ref 22–31)
CREAT SERPL-MCNC: 0.74 MG/DL — SIGNIFICANT CHANGE UP (ref 0.5–1.3)
EOSINOPHIL # BLD AUTO: 0.18 K/UL — SIGNIFICANT CHANGE UP (ref 0–0.5)
EOSINOPHIL NFR BLD AUTO: 1.6 % — SIGNIFICANT CHANGE UP (ref 0–6)
GLUCOSE SERPL-MCNC: 111 MG/DL — HIGH (ref 70–99)
HCT VFR BLD CALC: 30.9 % — LOW (ref 39–50)
HGB BLD-MCNC: 10.5 G/DL — LOW (ref 13–17)
IMM GRANULOCYTES NFR BLD AUTO: 1.6 % — HIGH (ref 0–1.5)
LYMPHOCYTES # BLD AUTO: 1.51 K/UL — SIGNIFICANT CHANGE UP (ref 1–3.3)
LYMPHOCYTES # BLD AUTO: 13.2 % — SIGNIFICANT CHANGE UP (ref 13–44)
MCHC RBC-ENTMCNC: 31 PG — SIGNIFICANT CHANGE UP (ref 27–34)
MCHC RBC-ENTMCNC: 34 GM/DL — SIGNIFICANT CHANGE UP (ref 32–36)
MCV RBC AUTO: 91.2 FL — SIGNIFICANT CHANGE UP (ref 80–100)
MONOCYTES # BLD AUTO: 0.72 K/UL — SIGNIFICANT CHANGE UP (ref 0–0.9)
MONOCYTES NFR BLD AUTO: 6.3 % — SIGNIFICANT CHANGE UP (ref 2–14)
NEUTROPHILS # BLD AUTO: 8.83 K/UL — HIGH (ref 1.8–7.4)
NEUTROPHILS NFR BLD AUTO: 77.2 % — HIGH (ref 43–77)
NRBC # BLD: 0 /100 WBCS — SIGNIFICANT CHANGE UP (ref 0–0)
PLATELET # BLD AUTO: 141 K/UL — LOW (ref 150–400)
POTASSIUM SERPL-MCNC: 4.2 MMOL/L — SIGNIFICANT CHANGE UP (ref 3.5–5.3)
POTASSIUM SERPL-SCNC: 4.2 MMOL/L — SIGNIFICANT CHANGE UP (ref 3.5–5.3)
RBC # BLD: 3.39 M/UL — LOW (ref 4.2–5.8)
RBC # FLD: 13.7 % — SIGNIFICANT CHANGE UP (ref 10.3–14.5)
SODIUM SERPL-SCNC: 136 MMOL/L — SIGNIFICANT CHANGE UP (ref 135–145)
WBC # BLD: 11.43 K/UL — HIGH (ref 3.8–10.5)
WBC # FLD AUTO: 11.43 K/UL — HIGH (ref 3.8–10.5)

## 2018-03-30 RX ORDER — SODIUM CHLORIDE 9 MG/ML
2000 INJECTION INTRAMUSCULAR; INTRAVENOUS; SUBCUTANEOUS
Qty: 0 | Refills: 0 | Status: DISCONTINUED | OUTPATIENT
Start: 2018-03-30 | End: 2018-03-31

## 2018-03-30 RX ADMIN — Medication 1 DROP(S): at 06:23

## 2018-03-30 RX ADMIN — CYCLOBENZAPRINE HYDROCHLORIDE 10 MILLIGRAM(S): 10 TABLET, FILM COATED ORAL at 13:54

## 2018-03-30 RX ADMIN — Medication 1 DROP(S): at 18:02

## 2018-03-30 RX ADMIN — OXYCODONE AND ACETAMINOPHEN 2 TABLET(S): 5; 325 TABLET ORAL at 06:23

## 2018-03-30 RX ADMIN — POLYETHYLENE GLYCOL 3350 17 GRAM(S): 17 POWDER, FOR SOLUTION ORAL at 12:21

## 2018-03-30 RX ADMIN — Medication 1 DROP(S): at 17:24

## 2018-03-30 RX ADMIN — Medication 1 DROP(S): at 22:25

## 2018-03-30 RX ADMIN — Medication 1 DROP(S): at 13:54

## 2018-03-30 RX ADMIN — Medication 100 MILLIGRAM(S): at 06:23

## 2018-03-30 RX ADMIN — OXYCODONE AND ACETAMINOPHEN 2 TABLET(S): 5; 325 TABLET ORAL at 18:20

## 2018-03-30 RX ADMIN — Medication 1 DROP(S): at 09:11

## 2018-03-30 RX ADMIN — SODIUM CHLORIDE 100 MILLILITER(S): 9 INJECTION INTRAMUSCULAR; INTRAVENOUS; SUBCUTANEOUS at 09:12

## 2018-03-30 RX ADMIN — OXYCODONE AND ACETAMINOPHEN 2 TABLET(S): 5; 325 TABLET ORAL at 22:25

## 2018-03-30 RX ADMIN — HYDROMORPHONE HYDROCHLORIDE 1 MILLIGRAM(S): 2 INJECTION INTRAMUSCULAR; INTRAVENOUS; SUBCUTANEOUS at 00:07

## 2018-03-30 RX ADMIN — CYCLOBENZAPRINE HYDROCHLORIDE 10 MILLIGRAM(S): 10 TABLET, FILM COATED ORAL at 06:23

## 2018-03-30 RX ADMIN — OXYCODONE AND ACETAMINOPHEN 2 TABLET(S): 5; 325 TABLET ORAL at 17:23

## 2018-03-30 RX ADMIN — Medication 1 DROP(S): at 12:21

## 2018-03-30 RX ADMIN — OXYCODONE AND ACETAMINOPHEN 2 TABLET(S): 5; 325 TABLET ORAL at 13:20

## 2018-03-30 RX ADMIN — CYCLOBENZAPRINE HYDROCHLORIDE 10 MILLIGRAM(S): 10 TABLET, FILM COATED ORAL at 22:25

## 2018-03-30 RX ADMIN — OXYCODONE AND ACETAMINOPHEN 2 TABLET(S): 5; 325 TABLET ORAL at 07:00

## 2018-03-30 RX ADMIN — Medication 100 MILLIGRAM(S): at 22:25

## 2018-03-30 RX ADMIN — Medication 1 DROP(S): at 20:52

## 2018-03-30 RX ADMIN — OXYCODONE AND ACETAMINOPHEN 2 TABLET(S): 5; 325 TABLET ORAL at 12:21

## 2018-03-30 NOTE — PROGRESS NOTE ADULT - ASSESSMENT
Pt is a 53 y/o male with no significant past medical history who few weeks ago developed LBP with radiation down his RLE s/p excision HNP L4-5 and L5-S1 on 3/13/18. He did well following surgery and was discharged home.  He was admitted with recurrent increased LBP and well as constipation, medicine consult called for comanagement.      * LBP-pt failed all conservative and microdiscectomy.  S/p L4-S1 lami with fusion, doing well, continue pain control, PT, encourage use of incentive spirometry.  * Hypotension-asymptomatic and likely due to acute blood loss anemia and narcotics.  IVF for now, monitor cbc.  * Lt Eye Tearing-suspect corneal abrasion.  Continue cipro eye drops, monitor  * Anxiety-xanax prn  * Constipation-continue colace, Miralax, Dulcolax  * Disp-OOB, ambulate, d/c planning   * Proph- venodynes while drain in place  * Comm- d/w pt in details, all questions answered, RN Pt is a 51 y/o male with no significant past medical history who few weeks ago developed LBP with radiation down his RLE s/p excision HNP L4-5 and L5-S1 on 3/13/18. He did well following surgery and was discharged home.  He was admitted with recurrent increased LBP and well as constipation, medicine consult called for comanagement.      * LBP-pt failed all conservative and microdiscectomy.  S/p L4-S1 lami with fusion, doing well, continue pain control, PT, encourage use of incentive spirometry.  * Hypotension-asymptomatic and likely due to acute blood loss anemia and narcotics.  IVF for now, monitor cbc.  * Acute Blood Loss Anemia-surgical loss, monitor drain outpt  * Thrombocytopenia-due to consumption from above, monitor as its improving.  * Lt Eye Tearing-suspect corneal abrasion.  Continue cipro eye drops, monitor  * Anxiety-xanax prn  * Constipation-continue colace, Miralax, Dulcolax  * Disp-OOB, ambulate, d/c planning   * Proph- venodynes while drain in place  * Comm- d/w pt in details, all questions answered, RN

## 2018-03-30 NOTE — PROGRESS NOTE ADULT - ASSESSMENT
A/P :  53y Male s/p lami/fusion/TLIF L4-S1 on 3/27/18     Plan:    Increase activity with PT  Monitor labs  D/C Planning when more mobile

## 2018-03-30 NOTE — PROGRESS NOTE ADULT - SUBJECTIVE AND OBJECTIVE BOX
Chief Complaint: R leg pain    HPI:  Patient with persistent complaints of R leg pain similar to preop. Tried PT/OOB yesterday without success. (+) BM IV steroids continue.    3/20/18 Patient with continued complaints of severe R sciatica. IV steroid taper finished  3./20/18 Returned tonight to review MRI. Patient with slight improvement of R sciatica  3/21/19 Patient with marked persistent R leg pain. No benefit with steroids nor increased Lyrica dosing. Markedly frustrated. Pain worse with sitting  3/22/18 Patient with persistent sciatica. Awaiting pelvic MRI  3/23/18 Patient with persistent pain. Second opinion appreciated  3/24/18 Patient with marked R sciatica-somewhat worse this AM  3/25/18 Patient has persistent pain.  3/27/18 S/P lami/fusion/TLIF L4-S  3/29/18 Patient with postoperative incisional pain-no radicular pain  3/30/18 Patient comfortable sitting in bedside chair. Only walking short distance    MEDICATIONS  (STANDING):  cyclobenzaprine 10 milliGRAM(s) Oral every 8 hours  dexamethasone  Injectable 4 milliGRAM(s) IV Push every 6 hours  dexamethasone  Injectable 2 milliGRAM(s) IV Push every 6 hours  docusate sodium 100 milliGRAM(s) Oral three times a day  heparin  Injectable 5000 Unit(s) SubCutaneous every 12 hours  polyethylene glycol 3350 17 Gram(s) Oral daily  senna 2 Tablet(s) Oral at bedtime    MEDICATIONS  (PRN):  acetaminophen   Tablet 650 milliGRAM(s) Oral every 6 hours PRN For Temp greater than 38.5 C (101.3 F)  acetaminophen   Tablet. 650 milliGRAM(s) Oral every 6 hours PRN Mild Pain (1 - 3)  diphenhydrAMINE   Injectable 12.5 milliGRAM(s) IV Push every 4 hours PRN Itching  magnesium hydroxide Suspension 30 milliLiter(s) Oral every 12 hours PRN Constipation  morphine  - Injectable 4 milliGRAM(s) IV Push every 3 hours PRN Moderate Pain (4 - 6)  oxyCODONE    5 mG/acetaminophen 325 mG 2 Tablet(s) Oral every 4 hours PRN Moderate Pain        OBJECTIVE:     PHYSICAL EXAM:    Vital Signs Last 24 Hrs  T(C): 37.2 (29 Mar 2018 23:45), Max: 37.6 (29 Mar 2018 11:29)  T(F): 98.9 (29 Mar 2018 23:45), Max: 99.7 (29 Mar 2018 11:29)  HR: 115 (30 Mar 2018 00:38) (108 - 118)  BP: 98/59 (30 Mar 2018 00:38) (95/54 - 107/66)  BP(mean): 76 (29 Mar 2018 11:29) (76 - 76)  RR: 16 (30 Mar 2018 00:38) (16 - 16)  SpO2: 98% (30 Mar 2018 00:38) (96% - 98%)    Awake and alert, comfortable. No significant pain  Tolerating diet  Voiding   Dressing clean and dry-removed. Incision clean and dry  Hvac with minimal drainage-removed  Neuro stable with slight R EHL weakness    LABS:                                           10.5   11.43 )-----------( 141      ( 30 Mar 2018 06:28 )             30.9     03-30    136  |  103  |  9   ----------------------------<  111<H>  4.2   |  29  |  0.74    Ca    8.2<L>      30 Mar 2018 06:28

## 2018-03-30 NOTE — PROGRESS NOTE ADULT - SUBJECTIVE AND OBJECTIVE BOX
Pt is still c/o LBP, no CP or SOB.  His bp is low normal (asymptomatic), required boluses for pain meds.     Vital Signs Last 24 Hrs  T(C): 37.2 (29 Mar 2018 23:45), Max: 37.6 (29 Mar 2018 11:29)  T(F): 98.9 (29 Mar 2018 23:45), Max: 99.7 (29 Mar 2018 11:29)  HR: 115 (30 Mar 2018 00:38) (108 - 118)  BP: 98/59 (30 Mar 2018 00:38) (95/54 - 107/66)  BP(mean): 76 (29 Mar 2018 11:29) (76 - 76)  RR: 16 (30 Mar 2018 00:38) (16 - 16)  SpO2: 98% (30 Mar 2018 00:38) (96% - 98%)    Daily     Daily     I&O's Detail    29 Mar 2018 07:01  -  30 Mar 2018 07:00  --------------------------------------------------------  IN:  Total IN: 0 mL    OUT:    Accordian: 15 mL    Indwelling Catheter - Urethral: 1200 mL    Voided: 325 mL  Total OUT: 1540 mL    Total NET: -1540 mL          CAPILLARY BLOOD GLUCOSE                                          10.5   11.43 )-----------( 141      ( 30 Mar 2018 06:28 )             30.9       03-30    136  |  103  |  9   ----------------------------<  111<H>  4.2   |  29  |  0.74    Ca    8.2<L>      30 Mar 2018 06:28                        MEDICATIONS  (STANDING):  ciprofloxacin  0.3% Ophthalmic Solution 1 Drop(s) Left EYE every 2 hours  cyclobenzaprine 10 milliGRAM(s) Oral every 8 hours  docusate sodium 100 milliGRAM(s) Oral three times a day  polyethylene glycol 3350 17 Gram(s) Oral daily  sodium chloride 0.9%. 2000 milliLiter(s) (100 mL/Hr) IV Continuous <Continuous>    MEDICATIONS  (PRN):  acetaminophen   Tablet 650 milliGRAM(s) Oral every 4 hours PRN For Temp greater than 38 C (100.4 F)  acetaminophen   Tablet. 650 milliGRAM(s) Oral every 4 hours PRN Mild Pain (1 - 3)  ALPRAZolam 0.25 milliGRAM(s) Oral every 8 hours PRN anxiety  artificial  tears Solution 1 Drop(s) Both EYES every 2 hours PRN Dry Eyes  HYDROmorphone  Injectable 1 milliGRAM(s) SubCutaneous every 3 hours PRN Severe Pain (7 - 10)  meclizine 25 milliGRAM(s) Oral three times a day PRN Dizziness  oxyCODONE    5 mG/acetaminophen 325 mG 1 Tablet(s) Oral every 4 hours PRN Mild Pain (1 - 3)  oxyCODONE    5 mG/acetaminophen 325 mG 2 Tablet(s) Oral every 4 hours PRN Moderate Pain (4 - 6)

## 2018-03-31 ENCOUNTER — TRANSCRIPTION ENCOUNTER (OUTPATIENT)
Age: 53
End: 2018-03-31

## 2018-03-31 VITALS
OXYGEN SATURATION: 100 % | HEART RATE: 102 BPM | DIASTOLIC BLOOD PRESSURE: 54 MMHG | SYSTOLIC BLOOD PRESSURE: 92 MMHG | TEMPERATURE: 98 F | RESPIRATION RATE: 16 BRPM

## 2018-03-31 LAB
HCT VFR BLD CALC: 26.5 % — LOW (ref 39–50)
HGB BLD-MCNC: 8.8 G/DL — LOW (ref 13–17)
MCHC RBC-ENTMCNC: 31.1 PG — SIGNIFICANT CHANGE UP (ref 27–34)
MCHC RBC-ENTMCNC: 33.2 GM/DL — SIGNIFICANT CHANGE UP (ref 32–36)
MCV RBC AUTO: 93.6 FL — SIGNIFICANT CHANGE UP (ref 80–100)
NRBC # BLD: 0 /100 WBCS — SIGNIFICANT CHANGE UP (ref 0–0)
PLATELET # BLD AUTO: 140 K/UL — LOW (ref 150–400)
RBC # BLD: 2.83 M/UL — LOW (ref 4.2–5.8)
RBC # FLD: 14 % — SIGNIFICANT CHANGE UP (ref 10.3–14.5)
WBC # BLD: 6.31 K/UL — SIGNIFICANT CHANGE UP (ref 3.8–10.5)
WBC # FLD AUTO: 6.31 K/UL — SIGNIFICANT CHANGE UP (ref 3.8–10.5)

## 2018-03-31 RX ORDER — CYCLOBENZAPRINE HYDROCHLORIDE 10 MG/1
1 TABLET, FILM COATED ORAL
Qty: 0 | Refills: 0 | COMMUNITY
Start: 2018-03-31

## 2018-03-31 RX ORDER — ALPRAZOLAM 0.25 MG
1 TABLET ORAL
Qty: 0 | Refills: 0 | COMMUNITY
Start: 2018-03-31

## 2018-03-31 RX ADMIN — Medication 1 DROP(S): at 06:18

## 2018-03-31 RX ADMIN — Medication 100 MILLIGRAM(S): at 06:18

## 2018-03-31 RX ADMIN — SODIUM CHLORIDE 100 MILLILITER(S): 9 INJECTION INTRAMUSCULAR; INTRAVENOUS; SUBCUTANEOUS at 00:39

## 2018-03-31 RX ADMIN — OXYCODONE AND ACETAMINOPHEN 2 TABLET(S): 5; 325 TABLET ORAL at 06:18

## 2018-03-31 RX ADMIN — Medication 1 DROP(S): at 10:41

## 2018-03-31 RX ADMIN — OXYCODONE AND ACETAMINOPHEN 2 TABLET(S): 5; 325 TABLET ORAL at 13:53

## 2018-03-31 RX ADMIN — CYCLOBENZAPRINE HYDROCHLORIDE 10 MILLIGRAM(S): 10 TABLET, FILM COATED ORAL at 06:18

## 2018-03-31 RX ADMIN — Medication 1 DROP(S): at 13:54

## 2018-03-31 NOTE — PROGRESS NOTE ADULT - SUBJECTIVE AND OBJECTIVE BOX
[facility]                                                            Orthopedic Spine Progress Note      POST OPERATIVE DAY #: 4  STATUS POST:     rev lami fusion inst BG BMP            Pre-Op Dx: Herniated lumbar disc without myelopathy  Postlaminectomy syndrome of lumbar region    Post-Op Dx:  Postlaminectomy syndrome of lumbar region  Degenerative disc disease, lumbar  Lumbar herniated disc    Prin. Procedure:   rev lami fusion inst BG BMP            SUBJECTIVE: Patient seen and examined.   feels much better  OOB walking  no rt leg pain    Pain (0-10): 4  Current Pain Management:  [ ] PCA   [ x] Po Analgesics [ ] IM /IV Anagesics     Vital Signs Last 24 Hrs  T(C): 36.9 (31 Mar 2018 11:54), Max: 37.1 (30 Mar 2018 22:44)  T(F): 98.5 (31 Mar 2018 11:54), Max: 98.8 (30 Mar 2018 22:44)  HR: 102 (31 Mar 2018 11:54) (102 - 106)  BP: 92/54 (31 Mar 2018 11:54) (92/54 - 105/71)  BP(mean): --  RR: 16 (31 Mar 2018 11:54) (16 - 18)  SpO2: 100% (31 Mar 2018 11:54) (99% - 100%)  I&O's Detail    31 Mar 2018 07:01  -  31 Mar 2018 13:24  --------------------------------------------------------  IN:    Oral Fluid: 240 mL  Total IN: 240 mL    OUT:  Total OUT: 0 mL    Total NET: 240 mL          OBJECTIVE: looks good  OOB walking        Wound /Dressing: removed  wound is clean and dry    Lumbar: ROM :limited due to fusion and pain  Neurological: A/O x      3         Sensation: [x ] intact to light touch  [ ] decreased:          Motor exam: [ x ]          [x ] Upper extremity    Delt      Bicp       Tri      Wrist ext  Wrst Flex       Digit Ext Digit Flex                                     R         5/5        5/5        5/5       5/5            5/5            5/5       5/5          5/5                                     L          5/5        5/5        5/5       5/5            5/5            5/5       5/5          5/5         [ x] Lower ext.     Hip Flx  Hip Ext   Hip Abd  Hip Add Quad   Hamstrg   TA       EHL      GS                              R        5/5        5/5        5/5             5/5        5/5        5/5        5/5     5/5      5/5                              L         5/5        5/5        5/5             5/5        5/5        5/5        5/5     5/5      5/5                                                        [x ] Vascular :  wnl           Tension Signs: neg          Long Tract Findings: neg    RADIOLOGY & ADDITIONAL STUDIES:                                               LABS:                        8.8    6.31  )-----------( 140      ( 31 Mar 2018 05:54 )             26.5     03-30    136  |  103  |  9   ----------------------------<  111<H>  4.2   |  29  |  0.74    Ca    8.2<L>      30 Mar 2018 06:28            Wound Culture:     A/P :  53y Male   s/p:   rev lami fusion inst BG BMP          -    Pain control  -    DVT ppx: SCDs    -    Abx:  completed  -    Review labs as indicated ........anemia    -    Physical Therapy...continues  -    Weight bearing status: full  -    Dispo: Home [ ]     Rehab [x ] [facility]                                                            Orthopedic Spine Progress Note      POST OPERATIVE DAY #: 4  STATUS POST:     rev lami fusion inst BG BMP            Pre-Op Dx: Herniated lumbar disc without myelopathy  Postlaminectomy syndrome of lumbar region    Post-Op Dx:  Postlaminectomy syndrome of lumbar region  Degenerative disc disease, lumbar  Lumbar herniated disc    Prin. Procedure:   rev lami fusion inst BG BMP            SUBJECTIVE: Patient seen and examined.   feels much better  OOB walking  no rt leg pain    Pain (0-10): 4  Current Pain Management:  [ ] PCA   [ x] Po Analgesics [ ] IM /IV Anagesics     Vital Signs Last 24 Hrs  T(C): 36.9 (31 Mar 2018 11:54), Max: 37.1 (30 Mar 2018 22:44)  T(F): 98.5 (31 Mar 2018 11:54), Max: 98.8 (30 Mar 2018 22:44)  HR: 102 (31 Mar 2018 11:54) (102 - 106)  BP: 92/54 (31 Mar 2018 11:54) (92/54 - 105/71)  BP(mean): --  RR: 16 (31 Mar 2018 11:54) (16 - 18)  SpO2: 100% (31 Mar 2018 11:54) (99% - 100%)  I&O's Detail    31 Mar 2018 07:01  -  31 Mar 2018 13:24  --------------------------------------------------------  IN:    Oral Fluid: 240 mL  Total IN: 240 mL    OUT:  Total OUT: 0 mL    Total NET: 240 mL          OBJECTIVE: looks good  OOB walking        Wound /Dressing: removed  wound is clean and dry    Lumbar: ROM :limited due to fusion and pain  Neurological: A/O x      3         Sensation: [x ] intact to light touch  [ ] decreased:          Motor exam: [ x ]          [x ] Upper extremity    Delt      Bicp       Tri      Wrist ext  Wrst Flex       Digit Ext Digit Flex                                     R         5/5        5/5        5/5       5/5            5/5            5/5       5/5          5/5                                     L          5/5        5/5        5/5       5/5            5/5            5/5       5/5          5/5         [ x] Lower ext.     Hip Flx  Hip Ext   Hip Abd  Hip Add Quad   Hamstrg   TA       EHL      GS                              R        5/5        5/5        5/5             5/5        5/5        5/5        5/5     5/5      5/5                              L         5/5        5/5        5/5             5/5        5/5        5/5        5/5     5/5      5/5                                                        [x ] Vascular :  wnl           Tension Signs: neg          Long Tract Findings: neg    RADIOLOGY & ADDITIONAL STUDIES:                                               LABS:                        8.8    6.31  )-----------( 140      ( 31 Mar 2018 05:54 )             26.5     03-30    136  |  103  |  9   ----------------------------<  111<H>  4.2   |  29  |  0.74    Ca    8.2<L>      30 Mar 2018 06:28            Wound Culture: NEG    A/P :  53y Male   s/p:   rev lami fusion inst BG BMP          -    Pain control  -    DVT ppx: SCDs    -    Abx:  completed  -    Review labs as indicated ........anemia    -    Physical Therapy...continues  -    Weight bearing status: full  -    Dispo: Home [ ]     Rehab [x ]

## 2018-03-31 NOTE — PROGRESS NOTE ADULT - CARDIOVASCULAR DETAILS
positive S1/positive S2
positive S2/positive S1
positive S1/positive S2
positive S2/positive S1

## 2018-03-31 NOTE — PROGRESS NOTE ADULT - PROVIDER SPECIALTY LIST ADULT
Internal Medicine
Neurosurgery
Orthopedics
Internal Medicine

## 2018-03-31 NOTE — PROGRESS NOTE ADULT - PSYCHIATRIC DETAILS
normal affect/normal behavior
normal behavior/normal affect
anxious
anxious
normal affect/normal behavior
normal behavior/normal affect
normal affect/normal behavior
normal behavior/normal affect
anxious

## 2018-03-31 NOTE — DISCHARGE NOTE ADULT - PATIENT PORTAL LINK FT
You can access the [facility], offered by [facility], by registering with the following website: [email or web address]

## 2018-03-31 NOTE — PROGRESS NOTE ADULT - RS GEN PE MLT RESP DETAILS PC
breath sounds equal/good air movement/respirations non-labored
good air movement/clear to auscultation bilaterally/breath sounds equal
good air movement/respirations non-labored/breath sounds equal/clear to auscultation bilaterally
breath sounds equal/respirations non-labored/good air movement/clear to auscultation bilaterally
breath sounds equal/clear to auscultation bilaterally/good air movement/respirations non-labored
clear to auscultation bilaterally/respirations non-labored/breath sounds equal
good air movement/clear to auscultation bilaterally/breath sounds equal/respirations non-labored
respirations non-labored/breath sounds equal/clear to auscultation bilaterally
respirations non-labored/breath sounds equal/clear to auscultation bilaterally/good air movement
breath sounds equal/clear to auscultation bilaterally/respirations non-labored/good air movement
breath sounds equal/respirations non-labored/clear to auscultation bilaterally

## 2018-03-31 NOTE — DISCHARGE NOTE ADULT - MEDICATION SUMMARY - MEDICATIONS TO TAKE
I will START or STAY ON the medications listed below when I get home from the hospital:    acetaminophen 325 mg oral tablet  -- 2 tab(s) by mouth every 4 hours, As needed, Mild Pain (1 - 3)  -- Indication: For Postlaminectomy syndrome of lumbar region    oxyCODONE-acetaminophen 5 mg-325 mg oral tablet  -- 2 tab(s) by mouth every 4 hours, As needed, Moderate Pain (4 - 6)  -- Indication: For Postlaminectomy syndrome of lumbar region    oxyCODONE-acetaminophen 5 mg-325 mg oral tablet  -- 1 tab(s) by mouth every 4 hours, As needed, Mild Pain (1 - 3)  -- Indication: For MIDLINE LOW BACK PAIN WITHOUT SCIATICA, UNSPECIFIED CHRONCITY    diphenhydrAMINE 25 mg oral capsule  -- 1 cap(s) by mouth once a day (at bedtime), As needed, Insomnia  -- Indication: For Postlaminectomy syndrome of lumbar region    ALPRAZolam 0.25 mg oral tablet  -- 1 tab(s) by mouth every 8 hours, As needed, anxiety  -- Indication: For MIDLINE LOW BACK PAIN WITHOUT SCIATICA, UNSPECIFIED CHRONCITY    docusate sodium 100 mg oral capsule  -- 1 cap(s) by mouth 3 times a day  -- Indication: For Postlaminectomy syndrome of lumbar region    polyethylene glycol 3350 oral powder for reconstitution  -- 17 gram(s) by mouth once a day  -- Indication: For Postlaminectomy syndrome of lumbar region    cyclobenzaprine 10 mg oral tablet  -- 1 tab(s) by mouth every 8 hours  -- Indication: For Postlaminectomy syndrome of lumbar region    fluticasone nasal 50 mcg/inh nasal spray  -- 2 spray(s) into nose once a day  -- For the nose.  It is very important that you take or use this exactly as directed.  Do not skip doses or discontinue unless directed by your doctor.    -- Indication: For Postlaminectomy syndrome of lumbar region

## 2018-03-31 NOTE — PROGRESS NOTE ADULT - NSHPATTENDINGPLANDISCUSS_GEN_ALL_CORE
patient, nursing, Dr. Bourne, Dr. Duke
patient, nursing, Dr. Bourne, Dr. Sher
pt, nursing, Dr case
pt, nursing
pt, nursing, Dr Bourne
pt, nursing, gf

## 2018-03-31 NOTE — PROGRESS NOTE ADULT - MS EXT PE MLT D E PC
no cyanosis/no clubbing
no cyanosis/no clubbing
no clubbing/no cyanosis
no cyanosis/no clubbing

## 2018-03-31 NOTE — DISCHARGE NOTE ADULT - CARE PROVIDER_API CALL
Gil Duke), Orthopaedic Surgery  [address] [place] [address]  Phone: [phone]  Fax: [phone]

## 2018-03-31 NOTE — PROGRESS NOTE ADULT - NEUROLOGICAL DETAILS
alert and oriented x 3

## 2018-03-31 NOTE — PROGRESS NOTE ADULT - ASSESSMENT
Pt is a 51 y/o male with no significant past medical history who few weeks ago developed LBP with radiation down his RLE s/p excision HNP L4-5 and L5-S1 on 3/13/18. He did well following surgery and was discharged home.  He was admitted with recurrent increased LBP and well as constipation, medicine consult called for comanagement.      * LBP-pt failed all conservative and microdiscectomy.  S/p L4-S1 lami with fusion, doing well, continue pain control, PT, encourage use of incentive spirometry.  * Hypotension-asymptomatic and likely due to acute blood loss anemia and narcotics, resolved.   * Acute Blood Loss Anemia-surgical loss, monitor for now, drain removed.  * Thrombocytopenia-due to consumption from above, monitor for now.  * Lt Eye Tearing-suspect corneal abrasion.  Continue cipro eye drops, monitor  * Anxiety-xanax prn  * Constipation-continue colace, Miralax, Dulcolax  * Disp-OOB, ambulate, d/c planning favor rehab over home   * Proph- venodynes for 1 more day until counts stable than consider heparin if okay with spine.    * Comm- d/w pt in details, all questions answered, RN

## 2018-03-31 NOTE — PROGRESS NOTE ADULT - GASTROINTESTINAL DETAILS
nontender/bowel sounds normal/no distention/soft
no distention/bowel sounds normal/soft/nontender
bowel sounds normal/soft/nontender/no distention
bowel sounds normal/nontender/no distention/soft
no distention/nontender/soft/bowel sounds normal
nontender/soft/no distention/bowel sounds normal
soft/no distention/nontender/bowel sounds normal
soft/nontender/no distention/bowel sounds normal
bowel sounds normal/nontender/soft/no distention
nontender/no distention/soft/bowel sounds normal

## 2018-03-31 NOTE — PROGRESS NOTE ADULT - CONSTITUTIONAL COMMENTS
Sitting up in chair in NAD
Anxious but in NAD
In NAD

## 2018-03-31 NOTE — PROGRESS NOTE ADULT - COMMENTS
All other ROS negative

## 2018-03-31 NOTE — PROGRESS NOTE ADULT - NEGATIVE GENERAL SYMPTOMS
no chills/no fever
no chills/no fever
no fever/no chills
no chills/no fever
no fever/no chills
no fever/no chills
no chills/no fever

## 2018-03-31 NOTE — PROGRESS NOTE ADULT - SUBJECTIVE AND OBJECTIVE BOX
Pt feels better with less back pain with no new complaints.  No CP or SOB.      Vital Signs Last 24 Hrs  T(C): 37.1 (30 Mar 2018 22:44), Max: 37.1 (30 Mar 2018 11:27)  T(F): 98.8 (30 Mar 2018 22:44), Max: 98.8 (30 Mar 2018 11:27)  HR: 102 (30 Mar 2018 22:44) (102 - 110)  BP: 104/61 (30 Mar 2018 22:44) (102/68 - 105/71)  BP(mean): 74 (30 Mar 2018 11:27) (74 - 74)  RR: 16 (30 Mar 2018 22:44) (16 - 18)  SpO2: 99% (30 Mar 2018 22:44) (99% - 100%)    Daily     Daily     I&O's Detail      CAPILLARY BLOOD GLUCOSE                                          8.8    6.31  )-----------( 140      ( 31 Mar 2018 05:54 )             26.5       03-30    136  |  103  |  9   ----------------------------<  111<H>  4.2   |  29  |  0.74    Ca    8.2<L>      30 Mar 2018 06:28                        MEDICATIONS  (STANDING):  ciprofloxacin  0.3% Ophthalmic Solution 1 Drop(s) Left EYE every 2 hours  cyclobenzaprine 10 milliGRAM(s) Oral every 8 hours  docusate sodium 100 milliGRAM(s) Oral three times a day  polyethylene glycol 3350 17 Gram(s) Oral daily  sodium chloride 0.9%. 2000 milliLiter(s) (100 mL/Hr) IV Continuous <Continuous>    MEDICATIONS  (PRN):  acetaminophen   Tablet 650 milliGRAM(s) Oral every 4 hours PRN For Temp greater than 38 C (100.4 F)  acetaminophen   Tablet. 650 milliGRAM(s) Oral every 4 hours PRN Mild Pain (1 - 3)  ALPRAZolam 0.25 milliGRAM(s) Oral every 8 hours PRN anxiety  artificial  tears Solution 1 Drop(s) Both EYES every 2 hours PRN Dry Eyes  HYDROmorphone  Injectable 1 milliGRAM(s) SubCutaneous every 3 hours PRN Severe Pain (7 - 10)  meclizine 25 milliGRAM(s) Oral three times a day PRN Dizziness  oxyCODONE    5 mG/acetaminophen 325 mG 1 Tablet(s) Oral every 4 hours PRN Mild Pain (1 - 3)  oxyCODONE    5 mG/acetaminophen 325 mG 2 Tablet(s) Oral every 4 hours PRN Moderate Pain (4 - 6)

## 2018-03-31 NOTE — DISCHARGE NOTE ADULT - HOSPITAL COURSE
admit  try further conservative care  failed  scheduled for more surgery to decrease pain and improve mobility  did well post op  off to rehab

## 2018-03-31 NOTE — PROGRESS NOTE ADULT - NECK DETAILS
supple/no JVD
supple/no JVD
no JVD/supple
supple/no JVD
supple/no JVD
no JVD/supple
supple/no JVD

## 2018-03-31 NOTE — DISCHARGE NOTE ADULT - NS AS ACTIVITY OBS
Do not make important decisions/Do not drive or operate machinery/Stairs allowed/Showering allowed/Walking-Indoors allowed/No Heavy lifting/straining

## 2018-03-31 NOTE — DISCHARGE NOTE ADULT - CARE PLAN
Principal Discharge DX:	Postlaminectomy syndrome of lumbar region  Goal:	independence  Assessment and plan of treatment:	increase activity as tolerated

## 2018-04-03 RX ORDER — ALPRAZOLAM 0.25 MG
1 TABLET ORAL
Qty: 30 | Refills: 0 | OUTPATIENT
Start: 2018-04-03

## 2018-04-03 RX ORDER — POLYETHYLENE GLYCOL 3350 17 G/17G
17 POWDER, FOR SOLUTION ORAL
Qty: 17 | Refills: 0 | OUTPATIENT
Start: 2018-04-03

## 2018-04-03 RX ORDER — CYCLOBENZAPRINE HYDROCHLORIDE 10 MG/1
1 TABLET, FILM COATED ORAL
Qty: 90 | Refills: 0 | OUTPATIENT
Start: 2018-04-03

## 2018-04-03 RX ORDER — OXYCODONE HYDROCHLORIDE 5 MG/1
1 TABLET ORAL
Qty: 180 | Refills: 0 | OUTPATIENT
Start: 2018-04-03

## 2018-04-03 RX ORDER — ACETAMINOPHEN 500 MG
2 TABLET ORAL
Qty: 30 | Refills: 0 | OUTPATIENT
Start: 2018-04-03

## 2018-04-03 RX ORDER — DOCUSATE SODIUM 100 MG
1 CAPSULE ORAL
Qty: 60 | Refills: 0 | OUTPATIENT
Start: 2018-04-03

## 2018-04-03 RX ORDER — FLUTICASONE PROPIONATE 50 MCG
2 SPRAY, SUSPENSION NASAL
Qty: 1 | Refills: 0 | OUTPATIENT
Start: 2018-04-03 | End: 2018-05-02

## 2018-04-04 DIAGNOSIS — S05.02XA INJURY OF CONJUNCTIVA AND CORNEAL ABRASION WITHOUT FOREIGN BODY, LEFT EYE, INITIAL ENCOUNTER: ICD-10-CM
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
## 2018-04-04 DIAGNOSIS — T40.605A ADVERSE EFFECT OF UNSPECIFIED NARCOTICS, INITIAL ENCOUNTER: ICD-10-CM

## 2018-04-04 DIAGNOSIS — T38.0X5A ADVERSE EFFECT OF GLUCOCORTICOIDS AND SYNTHETIC ANALOGUES, INITIAL ENCOUNTER: ICD-10-CM

## 2018-04-04 DIAGNOSIS — D69.6 THROMBOCYTOPENIA, UNSPECIFIED: ICD-10-CM

## 2018-04-04 DIAGNOSIS — D72.829 ELEVATED WHITE BLOOD CELL COUNT, UNSPECIFIED: ICD-10-CM

## 2018-04-04 DIAGNOSIS — M51.27 OTHER INTERVERTEBRAL DISC DISPLACEMENT, LUMBOSACRAL REGION: ICD-10-CM
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
## 2018-04-04 DIAGNOSIS — K59.09 OTHER CONSTIPATION: ICD-10-CM

## 2018-04-04 DIAGNOSIS — M96.1 POSTLAMINECTOMY SYNDROME, NOT ELSEWHERE CLASSIFIED: ICD-10-CM

## 2018-04-04 DIAGNOSIS — T41.205A ADVERSE EFFECT OF UNSPECIFIED GENERAL ANESTHETICS, INITIAL ENCOUNTER: ICD-10-CM

## 2018-04-04 DIAGNOSIS — I95.2 HYPOTENSION DUE TO DRUGS: ICD-10-CM

## 2018-04-04 DIAGNOSIS — R42 DIZZINESS AND GIDDINESS: ICD-10-CM

## 2018-04-04 DIAGNOSIS — E86.0 DEHYDRATION: ICD-10-CM

## 2018-04-04 DIAGNOSIS — M51.26 OTHER INTERVERTEBRAL DISC DISPLACEMENT, LUMBAR REGION: ICD-10-CM

## 2018-04-04 DIAGNOSIS — Z79.51 LONG TERM (CURRENT) USE OF INHALED STEROIDS: ICD-10-CM

## 2018-04-04 DIAGNOSIS — M51.36 OTHER INTERVERTEBRAL DISC DEGENERATION, LUMBAR REGION: ICD-10-CM

## 2018-04-04 DIAGNOSIS — M51.17 INTERVERTEBRAL DISC DISORDERS WITH RADICULOPATHY, LUMBOSACRAL REGION: ICD-10-CM

## 2018-04-04 DIAGNOSIS — M54.5 LOW BACK PAIN: ICD-10-CM

## 2018-04-04 DIAGNOSIS — F41.9 ANXIETY DISORDER, UNSPECIFIED: ICD-10-CM

## 2018-04-04 DIAGNOSIS — D62 ACUTE POSTHEMORRHAGIC ANEMIA: ICD-10-CM

## 2018-04-11 ENCOUNTER — APPOINTMENT (OUTPATIENT)
Dept: INTERNAL MEDICINE | Facility: CLINIC | Age: 53
End: 2018-04-11
Payer: COMMERCIAL

## 2018-04-11 VITALS
OXYGEN SATURATION: 97 % | BODY MASS INDEX: 23.85 KG/M2 | RESPIRATION RATE: 16 BRPM | WEIGHT: 161 LBS | SYSTOLIC BLOOD PRESSURE: 120 MMHG | TEMPERATURE: 98.3 F | DIASTOLIC BLOOD PRESSURE: 80 MMHG | HEIGHT: 69 IN | HEART RATE: 90 BPM

## 2018-04-11 DIAGNOSIS — M54.5 LOW BACK PAIN: ICD-10-CM

## 2018-04-11 DIAGNOSIS — R73.02 IMPAIRED GLUCOSE TOLERANCE (ORAL): ICD-10-CM

## 2018-04-11 PROCEDURE — 99214 OFFICE O/P EST MOD 30 MIN: CPT

## 2018-04-14 ENCOUNTER — EMERGENCY (EMERGENCY)
Facility: HOSPITAL | Age: 53
LOS: 0 days | Discharge: HOME | End: 2018-04-15
Attending: STUDENT IN AN ORGANIZED HEALTH CARE EDUCATION/TRAINING PROGRAM | Admitting: STUDENT IN AN ORGANIZED HEALTH CARE EDUCATION/TRAINING PROGRAM
Payer: COMMERCIAL

## 2018-04-14 VITALS
SYSTOLIC BLOOD PRESSURE: 145 MMHG | OXYGEN SATURATION: 100 % | TEMPERATURE: 99 F | HEIGHT: 69 IN | DIASTOLIC BLOOD PRESSURE: 91 MMHG | WEIGHT: 166.89 LBS | HEART RATE: 102 BPM | RESPIRATION RATE: 20 BRPM

## 2018-04-14 DIAGNOSIS — K40.90 UNILATERAL INGUINAL HERNIA, WITHOUT OBSTRUCTION OR GANGRENE, NOT SPECIFIED AS RECURRENT: ICD-10-CM

## 2018-04-14 DIAGNOSIS — Z79.899 OTHER LONG TERM (CURRENT) DRUG THERAPY: ICD-10-CM

## 2018-04-14 DIAGNOSIS — R10.31 RIGHT LOWER QUADRANT PAIN: ICD-10-CM

## 2018-04-14 LAB
ALBUMIN SERPL ELPH-MCNC: 3.7 G/DL — SIGNIFICANT CHANGE UP (ref 3.3–5)
ALP SERPL-CCNC: 82 U/L — SIGNIFICANT CHANGE UP (ref 40–120)
ALT FLD-CCNC: 27 U/L — SIGNIFICANT CHANGE UP (ref 12–78)
ANION GAP SERPL CALC-SCNC: 7 MMOL/L — SIGNIFICANT CHANGE UP (ref 5–17)
APTT BLD: 26.1 SEC — LOW (ref 27.5–37.4)
AST SERPL-CCNC: 16 U/L — SIGNIFICANT CHANGE UP (ref 15–37)
BASOPHILS # BLD AUTO: 0.04 K/UL — SIGNIFICANT CHANGE UP (ref 0–0.2)
BASOPHILS NFR BLD AUTO: 0.6 % — SIGNIFICANT CHANGE UP (ref 0–2)
BILIRUB SERPL-MCNC: 0.3 MG/DL — SIGNIFICANT CHANGE UP (ref 0.2–1.2)
BLD GP AB SCN SERPL QL: SIGNIFICANT CHANGE UP
BUN SERPL-MCNC: 26 MG/DL — HIGH (ref 7–23)
CALCIUM SERPL-MCNC: 9.2 MG/DL — SIGNIFICANT CHANGE UP (ref 8.5–10.1)
CHLORIDE SERPL-SCNC: 105 MMOL/L — SIGNIFICANT CHANGE UP (ref 96–108)
CO2 SERPL-SCNC: 30 MMOL/L — SIGNIFICANT CHANGE UP (ref 22–31)
CREAT SERPL-MCNC: 1.14 MG/DL — SIGNIFICANT CHANGE UP (ref 0.5–1.3)
EOSINOPHIL # BLD AUTO: 0.15 K/UL — SIGNIFICANT CHANGE UP (ref 0–0.5)
EOSINOPHIL NFR BLD AUTO: 2.3 % — SIGNIFICANT CHANGE UP (ref 0–6)
GLUCOSE SERPL-MCNC: 126 MG/DL — HIGH (ref 70–99)
HCT VFR BLD CALC: 35.9 % — LOW (ref 39–50)
HGB BLD-MCNC: 12 G/DL — LOW (ref 13–17)
IMM GRANULOCYTES NFR BLD AUTO: 1.4 % — SIGNIFICANT CHANGE UP (ref 0–1.5)
INR BLD: 0.96 RATIO — SIGNIFICANT CHANGE UP (ref 0.88–1.16)
LACTATE SERPL-SCNC: 1.7 MMOL/L — SIGNIFICANT CHANGE UP (ref 0.7–2)
LIDOCAIN IGE QN: 193 U/L — SIGNIFICANT CHANGE UP (ref 73–393)
LYMPHOCYTES # BLD AUTO: 2.21 K/UL — SIGNIFICANT CHANGE UP (ref 1–3.3)
LYMPHOCYTES # BLD AUTO: 34.2 % — SIGNIFICANT CHANGE UP (ref 13–44)
MCHC RBC-ENTMCNC: 31.2 PG — SIGNIFICANT CHANGE UP (ref 27–34)
MCHC RBC-ENTMCNC: 33.4 GM/DL — SIGNIFICANT CHANGE UP (ref 32–36)
MCV RBC AUTO: 93.2 FL — SIGNIFICANT CHANGE UP (ref 80–100)
MONOCYTES # BLD AUTO: 0.78 K/UL — SIGNIFICANT CHANGE UP (ref 0–0.9)
MONOCYTES NFR BLD AUTO: 12.1 % — SIGNIFICANT CHANGE UP (ref 2–14)
NEUTROPHILS # BLD AUTO: 3.2 K/UL — SIGNIFICANT CHANGE UP (ref 1.8–7.4)
NEUTROPHILS NFR BLD AUTO: 49.4 % — SIGNIFICANT CHANGE UP (ref 43–77)
NRBC # BLD: 0 /100 WBCS — SIGNIFICANT CHANGE UP (ref 0–0)
PLATELET # BLD AUTO: 534 K/UL — HIGH (ref 150–400)
POTASSIUM SERPL-MCNC: 4.2 MMOL/L — SIGNIFICANT CHANGE UP (ref 3.5–5.3)
POTASSIUM SERPL-SCNC: 4.2 MMOL/L — SIGNIFICANT CHANGE UP (ref 3.5–5.3)
PROT SERPL-MCNC: 7.1 GM/DL — SIGNIFICANT CHANGE UP (ref 6–8.3)
PROTHROM AB SERPL-ACNC: 10.4 SEC — SIGNIFICANT CHANGE UP (ref 9.8–12.7)
RBC # BLD: 3.85 M/UL — LOW (ref 4.2–5.8)
RBC # FLD: 14.5 % — SIGNIFICANT CHANGE UP (ref 10.3–14.5)
SODIUM SERPL-SCNC: 142 MMOL/L — SIGNIFICANT CHANGE UP (ref 135–145)
TYPE + AB SCN PNL BLD: SIGNIFICANT CHANGE UP
WBC # BLD: 6.47 K/UL — SIGNIFICANT CHANGE UP (ref 3.8–10.5)
WBC # FLD AUTO: 6.47 K/UL — SIGNIFICANT CHANGE UP (ref 3.8–10.5)

## 2018-04-14 PROCEDURE — 99284 EMERGENCY DEPT VISIT MOD MDM: CPT

## 2018-04-14 RX ORDER — SODIUM CHLORIDE 9 MG/ML
3 INJECTION INTRAMUSCULAR; INTRAVENOUS; SUBCUTANEOUS ONCE
Qty: 0 | Refills: 0 | Status: COMPLETED | OUTPATIENT
Start: 2018-04-14 | End: 2018-04-14

## 2018-04-14 RX ORDER — SODIUM CHLORIDE 9 MG/ML
1000 INJECTION INTRAMUSCULAR; INTRAVENOUS; SUBCUTANEOUS ONCE
Qty: 0 | Refills: 0 | Status: COMPLETED | OUTPATIENT
Start: 2018-04-14 | End: 2018-04-14

## 2018-04-14 RX ORDER — ACETAMINOPHEN 500 MG
975 TABLET ORAL ONCE
Qty: 0 | Refills: 0 | Status: COMPLETED | OUTPATIENT
Start: 2018-04-14 | End: 2018-04-14

## 2018-04-14 RX ADMIN — SODIUM CHLORIDE 3 MILLILITER(S): 9 INJECTION INTRAMUSCULAR; INTRAVENOUS; SUBCUTANEOUS at 23:13

## 2018-04-14 RX ADMIN — Medication 975 MILLIGRAM(S): at 23:13

## 2018-04-14 RX ADMIN — SODIUM CHLORIDE 1000 MILLILITER(S): 9 INJECTION INTRAMUSCULAR; INTRAVENOUS; SUBCUTANEOUS at 23:13

## 2018-04-14 NOTE — ED PROVIDER NOTE - GENITOURINARY, MLM
Questionable bulging in right groin. Circumcised. No testicular pain or swelling. ?swelling to right groin; Penis: Circumcised. No testicular pain or swelling.

## 2018-04-14 NOTE — ED PROVIDER NOTE - PROGRESS NOTE DETAILS
Selena DO: Patient resting comfortably; pending CT A/P, UA at this time. Selena MICHAEL: Patient reports pain is improved; found to have small fat containing hernia on CT scan; not incarcerated, strangulated; patient to f/u with surgery on call as outpatient in 1-2 days without fail; return to ED for worsening pain or any other concerns; strict return precautions given; patient comfortable with plan at this time.

## 2018-04-14 NOTE — ED PROVIDER NOTE - MEDICAL DECISION MAKING DETAILS
54 y/o Male with concern for inguinal hernia. Will order labs, UA, CT, re-eval. 54 yo male with concern for inguinal hernia; labs, CT A/P; UA; re-eval

## 2018-04-14 NOTE — ED PROVIDER NOTE - OBJECTIVE STATEMENT
52 y/o Male s/p back surgery of L4-L5 approximately 3 weeks ago presents to ED c/o right-sided groin pain. Pt states he was showering and noticed a slight bulge to the area, prompting visit to ED. No urinary complaints, no dysuria, no abd pain, no N/V/D. No hx of hernia. Pt denies any heavy lifting. Pt is currently calm and reports of no other acute sx. 54 y/o Male s/p back surgery of L4-L5 approximately 3 weeks ago presents to ED c/o right-sided groin pain. Pt states he was showering and noticed a slight bulge to the area, prompting visit to ED. No urinary complaints, no dysuria, no testicular pain or swelling; no abd pain, no N/V/D. No hx of hernia. Pt denies any heavy lifting. No trauma or injury; no fever or chills.

## 2018-04-15 VITALS
TEMPERATURE: 98 F | HEART RATE: 93 BPM | RESPIRATION RATE: 18 BRPM | OXYGEN SATURATION: 100 % | DIASTOLIC BLOOD PRESSURE: 80 MMHG | SYSTOLIC BLOOD PRESSURE: 128 MMHG

## 2018-04-15 LAB
APPEARANCE UR: CLEAR — SIGNIFICANT CHANGE UP
BACTERIA # UR AUTO: NEGATIVE — SIGNIFICANT CHANGE UP
BILIRUB UR-MCNC: NEGATIVE — SIGNIFICANT CHANGE UP
COLOR SPEC: YELLOW — SIGNIFICANT CHANGE UP
DIFF PNL FLD: NEGATIVE — SIGNIFICANT CHANGE UP
EPI CELLS # UR: SIGNIFICANT CHANGE UP
GLUCOSE UR QL: NEGATIVE MG/DL — SIGNIFICANT CHANGE UP
KETONES UR-MCNC: NEGATIVE — SIGNIFICANT CHANGE UP
LEUKOCYTE ESTERASE UR-ACNC: NEGATIVE — SIGNIFICANT CHANGE UP
NITRITE UR-MCNC: NEGATIVE — SIGNIFICANT CHANGE UP
PH UR: 6.5 — SIGNIFICANT CHANGE UP (ref 5–8)
PROT UR-MCNC: 15 MG/DL
RBC CASTS # UR COMP ASSIST: NEGATIVE /HPF — SIGNIFICANT CHANGE UP (ref 0–4)
SP GR SPEC: 1.01 — SIGNIFICANT CHANGE UP (ref 1.01–1.02)
UROBILINOGEN FLD QL: NEGATIVE MG/DL — SIGNIFICANT CHANGE UP
WBC UR QL: SIGNIFICANT CHANGE UP

## 2018-04-15 PROCEDURE — 74177 CT ABD & PELVIS W/CONTRAST: CPT | Mod: 26

## 2018-05-24 ENCOUNTER — EMERGENCY (EMERGENCY)
Facility: HOSPITAL | Age: 53
LOS: 0 days | Discharge: HOME | End: 2018-05-24
Attending: EMERGENCY MEDICINE | Admitting: EMERGENCY MEDICINE
Payer: COMMERCIAL

## 2018-05-24 VITALS
HEIGHT: 69 IN | RESPIRATION RATE: 18 BRPM | DIASTOLIC BLOOD PRESSURE: 101 MMHG | WEIGHT: 177.91 LBS | OXYGEN SATURATION: 98 % | TEMPERATURE: 98 F | HEART RATE: 134 BPM | SYSTOLIC BLOOD PRESSURE: 150 MMHG

## 2018-05-24 DIAGNOSIS — Z98.890 OTHER SPECIFIED POSTPROCEDURAL STATES: Chronic | ICD-10-CM

## 2018-05-24 DIAGNOSIS — M79.671 PAIN IN RIGHT FOOT: ICD-10-CM

## 2018-05-24 DIAGNOSIS — Z79.899 OTHER LONG TERM (CURRENT) DRUG THERAPY: ICD-10-CM

## 2018-05-24 DIAGNOSIS — R60.0 LOCALIZED EDEMA: ICD-10-CM

## 2018-05-24 DIAGNOSIS — Z98.890 OTHER SPECIFIED POSTPROCEDURAL STATES: ICD-10-CM

## 2018-05-24 DIAGNOSIS — Z98.1 ARTHRODESIS STATUS: ICD-10-CM

## 2018-05-24 PROCEDURE — 99284 EMERGENCY DEPT VISIT MOD MDM: CPT

## 2018-05-24 PROCEDURE — 93971 EXTREMITY STUDY: CPT | Mod: 26,RT

## 2018-05-24 NOTE — ED ADULT NURSE NOTE - OBJECTIVE STATEMENT
r/o DVT c/o right foot edema x2-3 weeks. Also reports intermittent pain to right foot. Pt reports taking Tylenol for pain. Denies fever, chills. r/o DVT c/o right foot edema x2-3 weeks. Also reports intermittent throbbing pain to right foot. Pt reports taking Tylenol for pain. Denies fever, chills.

## 2018-05-24 NOTE — ED STATDOCS - PROGRESS NOTE DETAILS
BRITNEY Wyman:   Patient has been seen, evaluated and orders have been written by the attending in intake. Patient is stable.  I will follow up the results of orders written and I will continue to evaluate/observe the patient. Doppler was negative for DVT.  No evidence of Infection.   Will dc home.  Explained elevation of legs, compression hose/ socks during the day with walking.  Pt. instructed not to use Motrin s/p Surgery.  F./U with PMD.

## 2018-05-24 NOTE — ED STATDOCS - SKIN, MLM
skin normal color for race, warm, dry and intact. skin normal color for race, warm, dry and intact. Incision site from lumbar back surgery was clean, dry, intact, no evidence of cellulitis.

## 2018-05-24 NOTE — ED STATDOCS - ATTENDING CONTRIBUTION TO CARE
I, Cristopher Mukherjee, performed the initial face to face bedside interview with this patient regarding history of present illness, review of symptoms and relevant past medical, social and family history.  I completed an independent physical examination.  I was the initial provider who evaluated this patient. I have signed out the follow up of any pending tests (i.e. labs, radiological studies) to the ACP.  I have communicated the patient’s plan of care and disposition with the ACP.  The history, relevant review of systems, past medical and surgical history, medical decision making, and physical examination was documented by the scribe in my presence and I attest to the accuracy of the documentation.

## 2018-05-24 NOTE — ED STATDOCS - OBJECTIVE STATEMENT
52 y/o m presenting to the ED sent by Dr. Martins for r/o DVT c/o right foot edema x weeks. Denies fever, chills. PCP Dr. Martins. 52 y/o m with PSHx of back surgery x2 presenting to the ED sent by Dr. Martins for r/o DVT c/o right foot edema x2-3 weeks. Also reports intermittent pain to right foot. Pt reports taking Tylenol for pain. Denies fever, chills. PCP Dr. Martins, Spinal surgeon Dr. Duke.

## 2018-05-24 NOTE — ED STATDOCS - MUSCULOSKELETAL, MLM
range of motion is not limited and there is no muscle tenderness. range of motion is not limited and there is no muscle tenderness. +non-pitting pedal edema to right foot. Soft, nontender right calf.

## 2018-06-19 ENCOUNTER — OUTPATIENT (OUTPATIENT)
Dept: OUTPATIENT SERVICES | Facility: HOSPITAL | Age: 53
LOS: 1 days | Discharge: HOME | End: 2018-06-19

## 2018-06-19 DIAGNOSIS — K40.90 UNILATERAL INGUINAL HERNIA, WITHOUT OBSTRUCTION OR GANGRENE, NOT SPECIFIED AS RECURRENT: ICD-10-CM

## 2018-06-19 DIAGNOSIS — K42.9 UMBILICAL HERNIA WITHOUT OBSTRUCTION OR GANGRENE: ICD-10-CM

## 2018-06-19 DIAGNOSIS — Z98.890 OTHER SPECIFIED POSTPROCEDURAL STATES: Chronic | ICD-10-CM

## 2018-06-19 LAB
ALBUMIN SERPL ELPH-MCNC: 4 G/DL — SIGNIFICANT CHANGE UP (ref 3.3–5)
ALP SERPL-CCNC: 56 U/L — SIGNIFICANT CHANGE UP (ref 40–120)
ALT FLD-CCNC: 30 U/L — SIGNIFICANT CHANGE UP (ref 12–78)
ANION GAP SERPL CALC-SCNC: 3 MMOL/L — LOW (ref 5–17)
APPEARANCE UR: CLEAR — SIGNIFICANT CHANGE UP
AST SERPL-CCNC: 20 U/L — SIGNIFICANT CHANGE UP (ref 15–37)
BACTERIA # UR AUTO: NEGATIVE — SIGNIFICANT CHANGE UP
BASOPHILS # BLD AUTO: 0.01 K/UL — SIGNIFICANT CHANGE UP (ref 0–0.2)
BASOPHILS NFR BLD AUTO: 0.3 % — SIGNIFICANT CHANGE UP (ref 0–2)
BILIRUB SERPL-MCNC: 0.8 MG/DL — SIGNIFICANT CHANGE UP (ref 0.2–1.2)
BILIRUB UR-MCNC: NEGATIVE — SIGNIFICANT CHANGE UP
BUN SERPL-MCNC: 16 MG/DL — SIGNIFICANT CHANGE UP (ref 7–23)
CALCIUM SERPL-MCNC: 8.8 MG/DL — SIGNIFICANT CHANGE UP (ref 8.5–10.1)
CHLORIDE SERPL-SCNC: 108 MMOL/L — SIGNIFICANT CHANGE UP (ref 96–108)
CO2 SERPL-SCNC: 30 MMOL/L — SIGNIFICANT CHANGE UP (ref 22–31)
COLOR SPEC: YELLOW — SIGNIFICANT CHANGE UP
CREAT SERPL-MCNC: 0.88 MG/DL — SIGNIFICANT CHANGE UP (ref 0.5–1.3)
DIFF PNL FLD: NEGATIVE — SIGNIFICANT CHANGE UP
EOSINOPHIL # BLD AUTO: 0.06 K/UL — SIGNIFICANT CHANGE UP (ref 0–0.5)
EOSINOPHIL NFR BLD AUTO: 1.6 % — SIGNIFICANT CHANGE UP (ref 0–6)
EPI CELLS # UR: NEGATIVE — SIGNIFICANT CHANGE UP
GLUCOSE SERPL-MCNC: 95 MG/DL — SIGNIFICANT CHANGE UP (ref 70–99)
GLUCOSE UR QL: NEGATIVE MG/DL — SIGNIFICANT CHANGE UP
HCT VFR BLD CALC: 40.1 % — SIGNIFICANT CHANGE UP (ref 39–50)
HGB BLD-MCNC: 13.5 G/DL — SIGNIFICANT CHANGE UP (ref 13–17)
IMM GRANULOCYTES NFR BLD AUTO: 0.5 % — SIGNIFICANT CHANGE UP (ref 0–1.5)
KETONES UR-MCNC: NEGATIVE — SIGNIFICANT CHANGE UP
LEUKOCYTE ESTERASE UR-ACNC: ABNORMAL
LYMPHOCYTES # BLD AUTO: 1.24 K/UL — SIGNIFICANT CHANGE UP (ref 1–3.3)
LYMPHOCYTES # BLD AUTO: 33.3 % — SIGNIFICANT CHANGE UP (ref 13–44)
MCHC RBC-ENTMCNC: 30.9 PG — SIGNIFICANT CHANGE UP (ref 27–34)
MCHC RBC-ENTMCNC: 33.7 GM/DL — SIGNIFICANT CHANGE UP (ref 32–36)
MCV RBC AUTO: 91.8 FL — SIGNIFICANT CHANGE UP (ref 80–100)
MONOCYTES # BLD AUTO: 0.41 K/UL — SIGNIFICANT CHANGE UP (ref 0–0.9)
MONOCYTES NFR BLD AUTO: 11 % — SIGNIFICANT CHANGE UP (ref 2–14)
MRSA PCR RESULT.: SIGNIFICANT CHANGE UP
NEUTROPHILS # BLD AUTO: 1.98 K/UL — SIGNIFICANT CHANGE UP (ref 1.8–7.4)
NEUTROPHILS NFR BLD AUTO: 53.3 % — SIGNIFICANT CHANGE UP (ref 43–77)
NITRITE UR-MCNC: NEGATIVE — SIGNIFICANT CHANGE UP
NRBC # BLD: 0 /100 WBCS — SIGNIFICANT CHANGE UP (ref 0–0)
PH UR: 7 — SIGNIFICANT CHANGE UP (ref 5–8)
PLATELET # BLD AUTO: 227 K/UL — SIGNIFICANT CHANGE UP (ref 150–400)
POTASSIUM SERPL-MCNC: 4.3 MMOL/L — SIGNIFICANT CHANGE UP (ref 3.5–5.3)
POTASSIUM SERPL-SCNC: 4.3 MMOL/L — SIGNIFICANT CHANGE UP (ref 3.5–5.3)
PROT SERPL-MCNC: 7.4 GM/DL — SIGNIFICANT CHANGE UP (ref 6–8.3)
PROT UR-MCNC: NEGATIVE MG/DL — SIGNIFICANT CHANGE UP
RBC # BLD: 4.37 M/UL — SIGNIFICANT CHANGE UP (ref 4.2–5.8)
RBC # FLD: 12.4 % — SIGNIFICANT CHANGE UP (ref 10.3–14.5)
RBC CASTS # UR COMP ASSIST: NEGATIVE /HPF — SIGNIFICANT CHANGE UP (ref 0–4)
S AUREUS DNA NOSE QL NAA+PROBE: DETECTED
SODIUM SERPL-SCNC: 141 MMOL/L — SIGNIFICANT CHANGE UP (ref 135–145)
SP GR SPEC: 1.01 — SIGNIFICANT CHANGE UP (ref 1.01–1.02)
UROBILINOGEN FLD QL: NEGATIVE MG/DL — SIGNIFICANT CHANGE UP
WBC # BLD: 3.72 K/UL — LOW (ref 3.8–10.5)
WBC # FLD AUTO: 3.72 K/UL — LOW (ref 3.8–10.5)
WBC UR QL: NEGATIVE — SIGNIFICANT CHANGE UP

## 2018-06-19 NOTE — CHART NOTE - NSCHARTNOTEFT_GEN_A_CORE
T 98.7  /86  HR 82  RR 16  SpO2 99  RA      53 year old male presents to PST for open right inguinal hernia repair with mesh  and umbilical hernia repair with mesh   1. PST instructions given ; NPO post midnight   2. Pt instructed to take following meds with sip of water : tylenol prn   3. EZ wash instructions given & mupirocin instructions given  4. Medical Evaluation with Dr Martins

## 2018-06-19 NOTE — ASU PATIENT PROFILE, ADULT - PMH
Herniated lumbar intervertebral disc    Inguinal hernia  right  Seasonal allergies    Umbilical hernia

## 2018-06-20 ENCOUNTER — APPOINTMENT (OUTPATIENT)
Dept: INTERNAL MEDICINE | Facility: CLINIC | Age: 53
End: 2018-06-20
Payer: COMMERCIAL

## 2018-06-20 ENCOUNTER — APPOINTMENT (OUTPATIENT)
Dept: NEUROSURGERY | Facility: CLINIC | Age: 53
End: 2018-06-20

## 2018-06-20 VITALS
DIASTOLIC BLOOD PRESSURE: 80 MMHG | HEART RATE: 86 BPM | SYSTOLIC BLOOD PRESSURE: 126 MMHG | OXYGEN SATURATION: 99 % | TEMPERATURE: 98.5 F | BODY MASS INDEX: 27.11 KG/M2 | RESPIRATION RATE: 18 BRPM | WEIGHT: 183 LBS | HEIGHT: 69 IN

## 2018-06-20 DIAGNOSIS — R73.03 PREDIABETES.: ICD-10-CM

## 2018-06-20 PROCEDURE — 99214 OFFICE O/P EST MOD 30 MIN: CPT

## 2018-06-20 RX ORDER — ACETAMINOPHEN 500 MG/1
500 TABLET, COATED ORAL
Refills: 0 | Status: ACTIVE | COMMUNITY
Start: 2018-06-20

## 2018-06-20 RX ORDER — CYCLOBENZAPRINE HYDROCHLORIDE 10 MG/1
10 TABLET, FILM COATED ORAL
Qty: 14 | Refills: 0 | Status: DISCONTINUED | COMMUNITY
Start: 2018-02-14 | End: 2018-06-20

## 2018-06-20 RX ORDER — ZOLPIDEM TARTRATE 10 MG/1
10 TABLET ORAL
Qty: 30 | Refills: 2 | Status: DISCONTINUED | COMMUNITY
Start: 2018-04-11 | End: 2018-06-20

## 2018-06-20 RX ORDER — OXYCODONE AND ACETAMINOPHEN 5; 325 MG/1; MG/1
5-325 TABLET ORAL
Qty: 40 | Refills: 0 | Status: DISCONTINUED | COMMUNITY
Start: 2018-03-02 | End: 2018-06-20

## 2018-06-20 RX ORDER — MELOXICAM 15 MG/1
15 TABLET ORAL
Qty: 20 | Refills: 0 | Status: DISCONTINUED | COMMUNITY
Start: 2018-02-14 | End: 2018-06-20

## 2018-06-20 NOTE — PHYSICAL EXAM
[No Acute Distress] : no acute distress [Well Nourished] : well nourished [Well Developed] : well developed [Well-Appearing] : well-appearing [Normal Sclera/Conjunctiva] : normal sclera/conjunctiva [PERRL] : pupils equal round and reactive to light [Normal Outer Ear/Nose] : the outer ears and nose were normal in appearance [Normal Oropharynx] : the oropharynx was normal [No JVD] : no jugular venous distention [Supple] : supple [No Lymphadenopathy] : no lymphadenopathy [Thyroid Normal, No Nodules] : the thyroid was normal and there were no nodules present [No Respiratory Distress] : no respiratory distress  [Clear to Auscultation] : lungs were clear to auscultation bilaterally [No Accessory Muscle Use] : no accessory muscle use [Normal Rate] : normal rate  [Regular Rhythm] : with a regular rhythm [Normal S1, S2] : normal S1 and S2 [No Murmur] : no murmur heard [No Abdominal Bruit] : a ~M bruit was not heard ~T in the abdomen [No Edema] : there was no peripheral edema [No Extremity Clubbing/Cyanosis] : no extremity clubbing/cyanosis [Soft] : abdomen soft [Non Tender] : non-tender [Non-distended] : non-distended [No HSM] : no HSM [Normal Bowel Sounds] : normal bowel sounds [Normal Posterior Cervical Nodes] : no posterior cervical lymphadenopathy [Normal Anterior Cervical Nodes] : no anterior cervical lymphadenopathy [No Rash] : no rash [Normal Insight/Judgement] : insight and judgment were intact [de-identified] : sitting toward one side b/c of low back pain [de-identified] : low back surgical scar [de-identified] : alert

## 2018-06-20 NOTE — HISTORY OF PRESENT ILLNESS
[de-identified] : This is a 53-year-old male seen for preoperative medical evaulation before planed inguinal hernia surgery on date June 25, 2018. The patient has a history of lumbar laminectomy in March of this year as well as subsequent lower spine fusion L4-S1 later in March of this year.  he tells me he has had no complications or problems with anesthesias or surgeries. He had titanium screws placed with the latter surgery. He does have low back pain well as tingling and numbness of the right foot. He takes tylenol prn.  He is followed by Dr. Gil Duke orthopedics, spine.\par \par Patient has a history of borderline diabetes. His recent fingersticks have been 108, 105. He also has hypertension that is treated with a low salt diet. He is not having any recent chest pain, shortness of breath, palpitations, lightheadedness, or syncope. No recent coughing, wheezing. No recent fever, chills, sweats.

## 2018-06-20 NOTE — REVIEW OF SYSTEMS
[Fever] : no fever [Chills] : no chills [Night Sweats] : no night sweats [Vision Problems] : no vision problems [Sore Throat] : no sore throat [Chest Pain] : no chest pain [Palpitations] : no palpitations [Shortness Of Breath] : no shortness of breath [Wheezing] : no wheezing [Cough] : no cough [Hematuria] : no hematuria [FreeTextEntry9] : positive low back pain [de-identified] : tingling and numbness R foot

## 2018-06-20 NOTE — ASSESSMENT
[FreeTextEntry1] : #1  53-year-old male with history of previous low back surgery with titanium screws, borderline diabetes, hypertension. Plan to have inguinal hernia surgery by Dr. Delroy Sierra on June 25. \par \par #2 HTN.\par \par #3 borderline DM.\par \par #4 overweight.\par \par #5 h.o. low spine surgeries, laminectomy and fusion.\par \par #6 low back pain.\par \par Pre op labs: raffaele swab staph aur detected, MRSA not detected.  Pt was told to take nasal mupiricin.   WBC 3,720. 53% neutophiles.   u/a Tr Leuk Est, \par EKG ST, otherwise nl.\par CXR no acute disease.\par \par There is no medical contraindication to surgery and the patient is at acceptible risk for planned inguinal hernia.surgery.

## 2018-06-22 RX ORDER — FENTANYL CITRATE 50 UG/ML
50 INJECTION INTRAVENOUS
Qty: 0 | Refills: 0 | Status: DISCONTINUED | OUTPATIENT
Start: 2018-06-25 | End: 2018-06-25

## 2018-06-22 RX ORDER — OXYCODONE HYDROCHLORIDE 5 MG/1
5 TABLET ORAL EVERY 4 HOURS
Qty: 0 | Refills: 0 | Status: DISCONTINUED | OUTPATIENT
Start: 2018-06-25 | End: 2018-06-25

## 2018-06-25 ENCOUNTER — OUTPATIENT (OUTPATIENT)
Dept: OUTPATIENT SERVICES | Facility: HOSPITAL | Age: 53
LOS: 1 days | Discharge: HOME | End: 2018-06-25

## 2018-06-25 VITALS
TEMPERATURE: 98 F | HEIGHT: 69 IN | HEART RATE: 100 BPM | OXYGEN SATURATION: 99 % | SYSTOLIC BLOOD PRESSURE: 135 MMHG | WEIGHT: 182.98 LBS | DIASTOLIC BLOOD PRESSURE: 90 MMHG | RESPIRATION RATE: 16 BRPM

## 2018-06-25 DIAGNOSIS — Z98.890 OTHER SPECIFIED POSTPROCEDURAL STATES: Chronic | ICD-10-CM

## 2018-06-25 RX ORDER — SODIUM CHLORIDE 9 MG/ML
1000 INJECTION, SOLUTION INTRAVENOUS
Qty: 0 | Refills: 0 | Status: DISCONTINUED | OUTPATIENT
Start: 2018-06-25 | End: 2018-06-25

## 2018-06-25 RX ORDER — MUPIROCIN 20 MG/G
0 OINTMENT TOPICAL
Qty: 0 | Refills: 0 | COMMUNITY

## 2018-06-25 RX ORDER — SODIUM CHLORIDE 9 MG/ML
1000 INJECTION, SOLUTION INTRAVENOUS
Qty: 0 | Refills: 0 | Status: DISCONTINUED | OUTPATIENT
Start: 2018-06-25 | End: 2018-06-26

## 2018-06-25 RX ORDER — OXYCODONE HYDROCHLORIDE 5 MG/1
5 TABLET ORAL EVERY 4 HOURS
Qty: 0 | Refills: 0 | Status: DISCONTINUED | OUTPATIENT
Start: 2018-06-25 | End: 2018-06-26

## 2018-06-25 RX ORDER — ONDANSETRON 8 MG/1
4 TABLET, FILM COATED ORAL ONCE
Qty: 0 | Refills: 0 | Status: COMPLETED | OUTPATIENT
Start: 2018-06-25 | End: 2018-06-25

## 2018-06-25 RX ORDER — HYDROMORPHONE HYDROCHLORIDE 2 MG/ML
0.5 INJECTION INTRAMUSCULAR; INTRAVENOUS; SUBCUTANEOUS
Qty: 0 | Refills: 0 | Status: DISCONTINUED | OUTPATIENT
Start: 2018-06-25 | End: 2018-06-26

## 2018-06-25 RX ORDER — OXYCODONE HYDROCHLORIDE 5 MG/1
10 TABLET ORAL EVERY 6 HOURS
Qty: 0 | Refills: 0 | Status: DISCONTINUED | OUTPATIENT
Start: 2018-06-25 | End: 2018-06-25

## 2018-06-25 RX ORDER — ACETAMINOPHEN 500 MG
2 TABLET ORAL
Qty: 0 | Refills: 0 | COMMUNITY

## 2018-06-25 RX ORDER — ACETAMINOPHEN 500 MG
650 TABLET ORAL EVERY 6 HOURS
Qty: 0 | Refills: 0 | Status: DISCONTINUED | OUTPATIENT
Start: 2018-06-25 | End: 2018-06-26

## 2018-06-25 RX ADMIN — FENTANYL CITRATE 50 MICROGRAM(S): 50 INJECTION INTRAVENOUS at 10:01

## 2018-06-25 RX ADMIN — OXYCODONE HYDROCHLORIDE 5 MILLIGRAM(S): 5 TABLET ORAL at 10:13

## 2018-06-25 RX ADMIN — HYDROMORPHONE HYDROCHLORIDE 0.5 MILLIGRAM(S): 2 INJECTION INTRAMUSCULAR; INTRAVENOUS; SUBCUTANEOUS at 11:02

## 2018-06-25 RX ADMIN — ONDANSETRON 4 MILLIGRAM(S): 8 TABLET, FILM COATED ORAL at 10:12

## 2018-06-25 RX ADMIN — HYDROMORPHONE HYDROCHLORIDE 0.5 MILLIGRAM(S): 2 INJECTION INTRAMUSCULAR; INTRAVENOUS; SUBCUTANEOUS at 10:26

## 2018-06-25 RX ADMIN — FENTANYL CITRATE 50 MICROGRAM(S): 50 INJECTION INTRAVENOUS at 10:12

## 2018-06-25 RX ADMIN — Medication 650 MILLIGRAM(S): at 22:55

## 2018-06-25 RX ADMIN — Medication 650 MILLIGRAM(S): at 14:40

## 2018-06-25 RX ADMIN — HYDROMORPHONE HYDROCHLORIDE 0.5 MILLIGRAM(S): 2 INJECTION INTRAMUSCULAR; INTRAVENOUS; SUBCUTANEOUS at 10:41

## 2018-06-25 RX ADMIN — OXYCODONE HYDROCHLORIDE 5 MILLIGRAM(S): 5 TABLET ORAL at 10:36

## 2018-06-25 RX ADMIN — HYDROMORPHONE HYDROCHLORIDE 0.5 MILLIGRAM(S): 2 INJECTION INTRAMUSCULAR; INTRAVENOUS; SUBCUTANEOUS at 11:04

## 2018-06-25 RX ADMIN — FENTANYL CITRATE 50 MICROGRAM(S): 50 INJECTION INTRAVENOUS at 09:56

## 2018-06-25 RX ADMIN — OXYCODONE HYDROCHLORIDE 5 MILLIGRAM(S): 5 TABLET ORAL at 10:12

## 2018-06-25 RX ADMIN — OXYCODONE HYDROCHLORIDE 5 MILLIGRAM(S): 5 TABLET ORAL at 09:56

## 2018-06-25 NOTE — ASU DISCHARGE PLAN (ADULT/PEDIATRIC). - MEDICATION SUMMARY - MEDICATIONS TO TAKE
I will START or STAY ON the medications listed below when I get home from the hospital:    acetaminophen 500 mg oral tablet  -- 2 tab(s) by mouth every 6 hours - 3)  -- Indication: For RIGHT INGUINAL HERNIA / UMBILICAL HERNIA    mupirocin topical  -- needs one more dose  -- Indication: For RIGHT INGUINAL HERNIA / UMBILICAL HERNIA

## 2018-06-26 VITALS
HEART RATE: 100 BPM | DIASTOLIC BLOOD PRESSURE: 70 MMHG | RESPIRATION RATE: 18 BRPM | TEMPERATURE: 99 F | OXYGEN SATURATION: 94 % | SYSTOLIC BLOOD PRESSURE: 121 MMHG

## 2018-06-26 RX ADMIN — Medication 650 MILLIGRAM(S): at 04:44

## 2018-06-26 RX ADMIN — Medication 650 MILLIGRAM(S): at 11:34

## 2018-06-26 RX ADMIN — SODIUM CHLORIDE 50 MILLILITER(S): 9 INJECTION, SOLUTION INTRAVENOUS at 04:44

## 2018-06-29 DIAGNOSIS — I10 ESSENTIAL (PRIMARY) HYPERTENSION: ICD-10-CM

## 2018-06-29 DIAGNOSIS — K42.9 UMBILICAL HERNIA WITHOUT OBSTRUCTION OR GANGRENE: ICD-10-CM

## 2018-06-29 DIAGNOSIS — K40.90 UNILATERAL INGUINAL HERNIA, WITHOUT OBSTRUCTION OR GANGRENE, NOT SPECIFIED AS RECURRENT: ICD-10-CM

## 2018-07-10 NOTE — PROGRESS NOTE ADULT - MUSCULOSKELETAL COMMENTS
Intractable LBP with radiation down his RLE LBP 5x Sit to Stand Test = 20 seconds, indicating significant impairment c functional mobility & strength  ; 2 Minute Walk Test = 154 feet without devices or rest stops, Pain rating 9/10, measured for baseline recording

## 2018-07-16 ENCOUNTER — APPOINTMENT (OUTPATIENT)
Dept: INTERNAL MEDICINE | Facility: CLINIC | Age: 53
End: 2018-07-16

## 2018-08-23 PROBLEM — J30.2 OTHER SEASONAL ALLERGIC RHINITIS: Chronic | Status: ACTIVE | Noted: 2018-06-19

## 2018-08-23 PROBLEM — M51.26 OTHER INTERVERTEBRAL DISC DISPLACEMENT, LUMBAR REGION: Chronic | Status: ACTIVE | Noted: 2018-06-19

## 2018-08-23 PROBLEM — K42.9 UMBILICAL HERNIA WITHOUT OBSTRUCTION OR GANGRENE: Chronic | Status: ACTIVE | Noted: 2018-06-19

## 2018-08-23 PROBLEM — K40.90 UNILATERAL INGUINAL HERNIA, WITHOUT OBSTRUCTION OR GANGRENE, NOT SPECIFIED AS RECURRENT: Chronic | Status: ACTIVE | Noted: 2018-06-19

## 2018-08-24 ENCOUNTER — APPOINTMENT (OUTPATIENT)
Dept: INTERNAL MEDICINE | Facility: CLINIC | Age: 53
End: 2018-08-24

## 2018-09-07 NOTE — ED STATDOCS - PRINCIPAL DIAGNOSIS
Sciatica of left side Sciatica of right side DVT: hold pharmacologic ppx due to risk uremic bleed  Diet: renal replacement  Activity: ambulate as tolerated

## 2018-12-05 NOTE — ED ADULT TRIAGE NOTE - WEIGHT METHOD
Telephone Encounter by Gavin Roberts DO at 01/03/18 12:05 PM     Author:  Gavin Roberts DO Service:  (none) Author Type:  Physician     Filed:  01/03/18 12:05 PM Encounter Date:  1/3/2018 Status:  Signed     :  Gavin Roberts DO (Physician)            cyclobenzaprine 10mg PO TID X#20  Tylenol Codeine #4 Disp # 20.[RP1.1M]       Revision History        User Key Date/Time User Provider Type Action    > RP1.1 01/03/18 12:05 PM Gavin Roberts DO Physician Sign    M - Manual             stated

## 2019-01-18 ENCOUNTER — OUTPATIENT (OUTPATIENT)
Dept: OUTPATIENT SERVICES | Facility: HOSPITAL | Age: 54
LOS: 1 days | Discharge: HOME | End: 2019-01-18
Payer: COMMERCIAL

## 2019-01-18 DIAGNOSIS — Z98.890 OTHER SPECIFIED POSTPROCEDURAL STATES: Chronic | ICD-10-CM

## 2019-01-18 DIAGNOSIS — M96.1 POSTLAMINECTOMY SYNDROME, NOT ELSEWHERE CLASSIFIED: ICD-10-CM

## 2019-01-18 PROCEDURE — 72114 X-RAY EXAM L-S SPINE BENDING: CPT | Mod: 26

## 2019-05-02 NOTE — ED PROVIDER NOTE - CPE EDP ENMT NORM
What Is The Reason For Today's Visit?: Full Body Skin Examination
What Is The Reason For Today's Visit? (Being Monitored For X): concerning skin lesions on an annual basis
normal...

## 2019-05-24 NOTE — ED ADULT NURSE NOTE - FALL HARM RISK TYPE OF ASSESSMENT
Lab Results   Component Value Date/Time    Hemoglobin A1c 12.4 (H) 04/15/2019 03:58 PM    Hemoglobin A1c 12.5 (H) 12/17/2018 10:34 AM    Hemoglobin A1c 13.8 (H) 09/25/2018 11:58 AM     Lab Results   Component Value Date/Time    Microalb/Creat ratio (ug/mg creat.) 889.1 (H) 04/13/2018 10:24 AM     Lab Results   Component Value Date/Time    Cholesterol, total 143 12/17/2018 10:34 AM    HDL Cholesterol 71 12/17/2018 10:34 AM    LDL, calculated 58 12/17/2018 10:34 AM    VLDL, calculated 14 12/17/2018 10:34 AM    Triglyceride 71 12/17/2018 10:34 AM    CHOL/HDL Ratio 2.6 01/15/2017 04:43 AM     Diabetic Foot and Eye Exam HM Status   Topic Date Due    Diabetic Foot Care  12/27/2019    Eye Exam  05/23/2020 Admission

## 2019-08-09 NOTE — PROGRESS NOTE ADULT - PROBLEM SELECTOR PLAN 1
Chief Complaint:   Chief Complaint   Patient presents with   • Referral Needed     mental health   • Medication Refill       HPI: Established patient  Tracy Calderon is a 38 y.o. female who presents for follow-up discuss the following concerns as follows today:     Anxiety  Patient requesting referral to see and establish psychiatric with and with psychiatry in [place], patient has been taking Celexa 40 mg daily, requesting to place a new referral today because she was unable to process the previous referral that was placed by Dr. Arreguin  She said she has been feeling much better especially after having a new boyfriend who is very supportive   Smoker  Continues to smoke down to 5 cigarettes/day she is trying her best to cut back on smoking she said she has nicotine patches and gums that she will start using after she stopped her last cigarette, counseled for smoking cessation today     Mild intermittent asthma in adult without complication    Requesting refill of her medication denies any shortness of breath or cough at this time or any other symptoms of asthma exacerbation        Past medical history, family history, social history and medications reviewed and updated in the record. Today   Current medications, problem list and allergies reviewed in [facility] today   Health maintenance topics are reviewed and updated.    Patient Active Problem List    Diagnosis Date Noted   • Hemangioma of liver 01/11/2019   • Impaired glucose tolerance 01/02/2018   • Morbid obesity with BMI of 45.0-49.9, adult ([facility]) 11/30/2017   • Chronic midline low back pain 10/10/2017   • Obesity hypoventilation syndrome ([facility]) 09/06/2017   • Binge eating disorder 07/06/2017   • Morbid obesity with BMI of 40.0-44.9, adult ([facility]) 03/07/2017   • Nocturnal sleep-related eating disorder 08/18/2015   • Bilateral edema of lower extremity 08/10/2015   • S/P nasal septoplasty 06/16/2015   • Anxiety 02/17/2015   • History of substance abuse 02/17/2015   •  Hyperlipidemia 02/17/2015   • GERD (gastroesophageal reflux disease) 07/18/2013   • Tobacco abuse 07/18/2013   • Pain of left breast 07/18/2013     Family History   Problem Relation Age of Onset   • Lung Disease Mother    • Stroke Mother    • Heart Disease Maternal Grandfather    • Cancer Neg Hx    • Diabetes Neg Hx      Social History     Socioeconomic History   • Marital status: Single     Spouse name: Not on file   • Number of children: Not on file   • Years of education: Not on file   • Highest education level: Not on file   Occupational History   • Not on file   Social Needs   • Financial resource strain: Not on file   • Food insecurity:     Worry: Not on file     Inability: Not on file   • Transportation needs:     Medical: Not on file     Non-medical: Not on file   Tobacco Use   • Smoking status: Current Every Day Smoker     Packs/day: 0.25     Years: 15.00     Pack years: 3.75     Types: Cigarettes   • Smokeless tobacco: Never Used   Substance and Sexual Activity   • Alcohol use: No     Comment: h/o heavy drinking x 16 years. quit drinking in May 2013   • Drug use: Yes     Types: Marijuana, Inhaled     Comment: history of meth use, quit 2007, marijuana daily   • Sexual activity: Not Currently     Partners: Male   Lifestyle   • Physical activity:     Days per week: Not on file     Minutes per session: Not on file   • Stress: Not on file   Relationships   • Social connections:     Talks on phone: Not on file     Gets together: Not on file     Attends Hindu service: Not on file     Active member of club or organization: Not on file     Attends meetings of clubs or organizations: Not on file     Relationship status: Not on file   • Intimate partner violence:     Fear of current or ex partner: Not on file     Emotionally abused: Not on file     Physically abused: Not on file     Forced sexual activity: Not on file   Other Topics Concern   • Not on file   Social History Narrative   • Not on file     Current  "Outpatient Medications   Medication Sig Dispense Refill   • albuterol (VENTOLIN HFA) 108 (90 Base) MCG/ACT Aero Soln inhalation aerosol INHALE TWO PUFFS BY MOUTH EVERY 6 HOURS AS NEEDED FOR SHORTNESS OF BREATH 1 Inhaler 11   • atorvastatin (LIPITOR) 40 MG Tab TAKE 1 TABLET BY MOUTH ONCE DAILY IN THE EVENING 90 Tab 1   • citalopram (CELEXA) 40 MG Tab      • albuterol (PROVENTIL) 2.5mg/3ml Nebu Soln solution for nebulization 3 mL by Nebulization route every four hours as needed. AS NEEDED FOR SHORTNESS OF BREATH 360 mL 3   • clonazepam (KLONOPIN) 0.5 MG Tab Take 0.5 mg by mouth 3 times a day.     • prazosin (MINIPRESS) 5 MG Cap Take 10 mg by mouth every evening.     • raNITidine (ZANTAC) 150 MG Tab TAKE 1 TABLET BY MOUTH ONCE DAILY AS NEEDED FOR  HEARTBURN (Patient not taking: Reported on 8/9/2019) 30 Tab 3   • nicotine (NICOTROL) 10 MG inhaler Inhale 1 Puff by mouth as needed for Smoking Cessation. (Patient not taking: Reported on 8/9/2019) 168 Each 3   • nicotine polacrilex (COMMIT) 4 MG lozenge Place 1 Lozenge in mouth by cheek as needed. (Patient not taking: Reported on 8/9/2019) 30 Lozenge 3   • clotrimazole (LOTRIMIN) 1 % Cream Apply on affected area twice daily x 2 weeks (Patient not taking: Reported on 8/9/2019) 1 Tube 0   • clotrimazole (GYNE-LOTRIMIN) 2 % Cream Insert 1 Applicatorful in vagina every bedtime. (Patient not taking: Reported on 8/9/2019) 1 Tube 0   • triamcinolone (NASACORT) 55 MCG/ACT nasal inhaler Spray 2 Sprays in nose every day. (Patient not taking: Reported on 8/9/2019) 1 Bottle 0     No current facility-administered medications for this visit.            Review Of Systems  As documented in HPI above  PHYSICAL EXAMINATION:    /68 (BP Location: Left arm, Patient Position: Sitting, BP Cuff Size: Adult)   Pulse 100   Temp 36.6 °C (97.8 °F) (Temporal)   Resp 20   Ht 1.676 m (5' 6\")   Wt 119.3 kg (263 lb)   SpO2 92%   BMI 42.45 kg/m²   Gen.: Well-developed, well-nourished, no " apparent distress, pleasant and cooperative with the examination  HEENT: Normocephalic/atraumatic,   Neck: No JVD or bruits, no adenopathy  Cor: Regular rate and rhythm without murmur gallop or rub  Lungs: Clear to auscultation with equal breath sounds bilaterally. No wheezes, rhonchi.  Abdomen: Soft nontender without hepatosplenomegaly or masses appreciated, normoactive bowel sounds  Extremities: No cyanosis, clubbing or edema          ASSESSMENT/Plan:    1. Anxiety   patient to continue Celexa as directed placed new referral as per patient request to establish with psychiatry    REFERRAL TO PSYCHIATRY   2. Smoker   counseled strongly for smoking cessation today   3. Mild intermittent asthma in adult without complication   refilled albuterol and counseled for smoking cessation.       Please note that this dictation was created using voice recognition software. I have made every reasonable attempt to correct obvious errors but there may be errors of grammar and content that I may have overlooked prior to finalization of this note.        alternate pain meds  mobilize  try to send home tomorrow  but pt still may be a candidate for surgery  all questions answered

## 2019-12-10 NOTE — PATIENT PROFILE ADULT. - TEACHING/LEARNING FACTORS INFLUENCE READINESS TO LEARN
"[facility] at [facility]        Date of Hospital Visit: 12/10/19      Place of Service: [facility]    Patient Name: Tho Kohli  :10/8/1927    Primary Care Provider: Daniel Adams MD    Chief complaint/Reason for Consultation:  stroke         Problem List:  Patient Active Problem List    Diagnosis    • *Stroke (CMS/HCC)         • Elevated troponin         • Essential hypertension         • Abnormal EKG         • Grade III diastolic dysfunction           1. Echo 12-10-19:  · Estimated EF appears to be in the range of 61 - 65%.  · Left ventricular diastolic dysfunction (grade II) consistent with pseudonormalization     • Dysarthria    • SALVADOR (acute kidney injury) (CMS/HCC)                History of Present Illness:  This is a 89-year-old hypertensive male with no prior cardiac or neurologic significant history.  He presented to the [facility] emergency department yesterday with a complaint of fall secondary to left-sided weakness of sudden onset.  He was admitted to the hospitalist service.  His MRI is consistent with bilateral CVA.  He had CTA of the neck showing a \"focal density\" on the left internal carotid artery however a carotid duplex study showed mild plaque with no significant carotid artery disease.  He had an echocardiogram which is remarkable only for grade 2 diastolic dysfunction.  He denies any awareness of tachyarrhythmias, dizziness or syncope.  He has no prior history of CVA or TIA.  He has no complaint of exertional chest pain or dyspnea no orthopnea no PND no claudication.  He has some very recent mild right lower extremity swelling which is nonpainful and of new onset.  His lipid panel is excellent.  She is incidental finding of mildly elevated troponins in the absence of chest pain.                    History reviewed. No pertinent surgical history.    No Known Allergies    Medications Prior to Admission   Medication Sig Dispense Refill Last Dose "   • hydroCHLOROthiazide (HYDRODIURIL) 25 MG tablet Take 25 mg by mouth Daily.      • losartan (COZAAR) 25 MG tablet Take 25 mg by mouth Daily.            Current Facility-Administered Medications:   •  acetaminophen (TYLENOL) tablet 650 mg, 650 mg, Oral, Q4H PRN **OR** acetaminophen (TYLENOL) 160 MG/5ML solution 650 mg, 650 mg, Oral, Q4H PRN **OR** acetaminophen (TYLENOL) suppository 650 mg, 650 mg, Rectal, Q4H PRN, Faby Valerio R, DO  •  aspirin chewable tablet 81 mg, 81 mg, Oral, Daily, 81 mg at 12/10/19 0816 **OR** aspirin suppository 300 mg, 300 mg, Rectal, Daily, Faby Valerio R, DO  •  atorvastatin (LIPITOR) tablet 80 mg, 80 mg, Oral, Nightly, Faby Valerio R, DO, 80 mg at 12/09/19 2107  •  ondansetron (ZOFRAN) injection 4 mg, 4 mg, Intravenous, Q6H PRN, Faby Valerio R, DO  •  sodium chloride 0.9 % flush 10 mL, 10 mL, Intravenous, PRN, Faby Valerio R, DO  •  sodium chloride 0.9 % flush 10 mL, 10 mL, Intravenous, Q12H, Faby Valerio, DO, 10 mL at 12/10/19 0817  •  sodium chloride 0.9 % flush 10 mL, 10 mL, Intravenous, PRN, Faby Valerio R, DO  •  sodium chloride 0.9 % infusion, 100 mL/hr, Intravenous, Continuous, Siobhan Hall MD  •  tamsulosin (FLOMAX) 24 hr capsule 0.4 mg, 0.4 mg, Oral, Daily, Siobhan Hall MD      Social History     Socioeconomic History   • Marital status: Single     Spouse name: Not on file   • Number of children: Not on file   • Years of education: Not on file   • Highest education level: Not on file   Tobacco Use   • Smoking status: Never Smoker   • Smokeless tobacco: Never Used   Substance and Sexual Activity   • Drug use: Never   • Sexual activity: Defer       History reviewed. No pertinent family history.    REVIEW OF SYSTEMS:   Review of Systems   Constitution: Negative for diaphoresis, malaise/fatigue and weight gain.   HENT: Negative.    Eyes: Negative.    Cardiovascular: Positive for leg swelling. Negative for chest pain, claudication, dyspnea on  "exertion, irregular heartbeat, orthopnea, palpitations, paroxysmal nocturnal dyspnea and syncope.   Respiratory: Negative.  Negative for cough, shortness of breath, sleep disturbances due to breathing and wheezing.    Endocrine: Negative.    Hematologic/Lymphatic: Negative.  Negative for bleeding problem.   Skin: Negative.    Musculoskeletal: Negative.  Negative for muscle cramps, muscle weakness and myalgias.   Gastrointestinal: Negative.  Negative for heartburn.   Genitourinary: Negative.    Neurological: Positive for disturbances in coordination, focal weakness, loss of balance and weakness.   Psychiatric/Behavioral: Negative.    Allergic/Immunologic: Negative.    All other systems reviewed and are negative.           Objective:  Vitals:    12/09/19 2351 12/10/19 0700 12/10/19 1100 12/10/19 1142   BP: (!) 161/103 177/98 157/97    BP Location: Right arm Right arm Right arm    Patient Position: Lying Lying Lying    Pulse: 89 85 67    Resp: 18 18 18    Temp: 98.2 °F (36.8 °C) 98.6 °F (37 °C) 97.6 °F (36.4 °C)    TempSrc: Oral Oral Oral    SpO2: 95% 96% 96%    Weight:    72.6 kg (160 lb)   Height:    180.3 cm (70.98\")     Body mass index is 22.33 kg/m².  Flowsheet Rows      First Filed Value   Admission Height  180.3 cm (71\") Documented at 12/09/2019 1129   Admission Weight  75.8 kg (167 lb) Documented at 12/09/2019 1129          Intake/Output Summary (Last 24 hours) at 12/10/2019 1408  Last data filed at 12/9/2019 1730  Gross per 24 hour   Intake 1000 ml   Output --   Net 1000 ml       Physical Exam   Constitutional: He is oriented to person, place, and time. He appears well-developed and well-nourished.   HENT:   Head: Normocephalic and atraumatic.   Mouth/Throat: Oropharynx is clear and moist.   Eyes: Pupils are equal, round, and reactive to light. EOM are normal. No scleral icterus.   Neck: Neck supple. No JVD present. No thyromegaly present.   Cardiovascular: Normal rate, regular rhythm and normal heart sounds. " Exam reveals no gallop and no friction rub.   No murmur heard.  Pulmonary/Chest: Breath sounds normal. No stridor. He has no wheezes. He has no rales.   Abdominal: Soft. Bowel sounds are normal. He exhibits no distension and no mass. There is no tenderness.   Musculoskeletal: Normal range of motion. He exhibits edema. He exhibits no tenderness or deformity.   Trace edema RLE   Lymphadenopathy:     He has no cervical adenopathy.   Neurological: He is alert and oriented to person, place, and time. No cranial nerve deficit. Coordination normal.   Skin: Skin is warm and dry. No rash noted. No erythema.   Psychiatric: He has a normal mood and affect.   Vitals reviewed.                Lab Review:                Results from last 7 days   Lab Units 12/10/19  0513   SODIUM mmol/L 139   POTASSIUM mmol/L 3.6   CHLORIDE mmol/L 102   CO2 mmol/L 25.0   BUN mg/dL 23   CREATININE mg/dL 1.19   GLUCOSE mg/dL 86   CALCIUM mg/dL 9.0     Results from last 7 days   Lab Units 12/09/19  1136   WBC 10*3/mm3 8.83   HEMOGLOBIN g/dL 12.9*   HEMATOCRIT % 39.6   PLATELETS 10*3/mm3 197         Lab Results   Component Value Date    CHOL 133 12/10/2019     Lab Results   Component Value Date    TRIG 46 12/10/2019     Lab Results   Component Value Date    HDL 57 12/10/2019     Lab Results   Component Value Date    LDL 67 12/10/2019     Lab Results   Component Value Date    HGBA1C 5.30 12/10/2019     Estimated Creatinine Clearance: 40.7 mL/min (by C-G formula based on SCr of 1.19 mg/dL).  Results from last 7 days   Lab Units 12/09/19  1419 12/09/19  1136   TROPONIN T ng/mL 0.093* 0.055*           EKG:                     Assessment:   · Mildly elevated troponin  · Grade 2 diastolic dysfunction in the absence of acute congestive heart failure  · Acute CVA, likely cardioembolic per neurology who request transesophageal echocardiogram  · Hypertension          Plan:   1.  Plan for transesophageal echocardiogram tomorrow  2.  Ambulatory ECG monitor if no  cardioembolic source for stroke is uncovered on BLADIMIR      Electronically signed by BILL Webster, 12/10/19, 2:17 PM.    Patient seen and examined and interviewed in a face-to-face encounter.  Case discussed with physician assistant.  Neuro imaging concerning for cardiac etiology of stroke.  Exam reveals normal S1-S2, no murmurs or gallops, clear lung fields no edema.  Planning for BLADIMIR tomorrow with possible follow-up outpatient ambulatory ECG monitor.  Agree with aspirin and high intensity statin therapy.  Recommendations regarding indications for chronic anticoagulation to follow cardiac evaluation     none

## 2019-12-18 NOTE — PROGRESS NOTE ADULT - EYES
detailed exam EOMI/PERRL Female Pregnancy Counseling Text: Female patients should also be on two forms of birth control while taking this medication and for one month after their last dose.

## 2020-03-11 ENCOUNTER — APPOINTMENT (OUTPATIENT)
Dept: INTERNAL MEDICINE | Facility: CLINIC | Age: 55
End: 2020-03-11

## 2020-03-16 ENCOUNTER — APPOINTMENT (OUTPATIENT)
Dept: INTERNAL MEDICINE | Facility: CLINIC | Age: 55
End: 2020-03-16
Payer: COMMERCIAL

## 2020-03-16 VITALS
HEIGHT: 69 IN | DIASTOLIC BLOOD PRESSURE: 98 MMHG | TEMPERATURE: 98.3 F | RESPIRATION RATE: 18 BRPM | BODY MASS INDEX: 30.36 KG/M2 | OXYGEN SATURATION: 97 % | WEIGHT: 205 LBS | HEART RATE: 107 BPM | SYSTOLIC BLOOD PRESSURE: 144 MMHG
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
## 2020-03-16 DIAGNOSIS — Z98.1 ARTHRODESIS STATUS: ICD-10-CM

## 2020-03-16 PROCEDURE — 99213 OFFICE O/P EST LOW 20 MIN: CPT

## 2020-03-16 NOTE — ASSESSMENT
[FreeTextEntry1] : #1 status post fall 3 days ago. History of L 4- 5- S1 spinal fusion with placement of plate and screws.  Now has LBP. Patient is having tenderness in the lower thoracic and lumbar lumbar spine and right para lower thoracic and R para upper lumbar spine areas. He does have some decreased strength of dorsiflexion of the right foot. I spoke with his orthopedic spine surgeon Dr. Gil Longoria and patient will be seeing him in his office today. Patient also given a work note.

## 2020-03-16 NOTE — HISTORY OF PRESENT ILLNESS
[FreeTextEntry1] : fell on back Fri [de-identified] : This is a 55-year-old male with a history of hypertension, previous L4-5-S1 laminectomy with fusion and placement of plate and screws, who fell on back 3 days ago. He has noted pain especially in the right upper lumbar area. Also notes pain R buttock.  No urine or stool incon-tinence.

## 2020-04-25 ENCOUNTER — MESSAGE (OUTPATIENT)
Age: 55
End: 2020-04-25

## 2020-04-27 ENCOUNTER — APPOINTMENT (OUTPATIENT)
Dept: INTERNAL MEDICINE | Facility: CLINIC | Age: 55
End: 2020-04-27
Payer: COMMERCIAL

## 2020-04-27 ENCOUNTER — EMERGENCY (EMERGENCY)
Facility: HOSPITAL | Age: 55
LOS: 0 days | Discharge: HOME | End: 2020-04-27
Payer: COMMERCIAL

## 2020-04-27 VITALS
RESPIRATION RATE: 18 BRPM | DIASTOLIC BLOOD PRESSURE: 90 MMHG | SYSTOLIC BLOOD PRESSURE: 135 MMHG | HEART RATE: 129 BPM | OXYGEN SATURATION: 95 % | TEMPERATURE: 99 F

## 2020-04-27 DIAGNOSIS — R50.9 FEVER, UNSPECIFIED: ICD-10-CM

## 2020-04-27 DIAGNOSIS — R05 COUGH: ICD-10-CM

## 2020-04-27 DIAGNOSIS — Z98.890 OTHER SPECIFIED POSTPROCEDURAL STATES: Chronic | ICD-10-CM

## 2020-04-27 DIAGNOSIS — J30.9 ALLERGIC RHINITIS, UNSPECIFIED: ICD-10-CM

## 2020-04-27 DIAGNOSIS — M79.10 MYALGIA, UNSPECIFIED SITE: ICD-10-CM

## 2020-04-27 DIAGNOSIS — U07.1 COVID-19: ICD-10-CM

## 2020-04-27 PROCEDURE — 99213 OFFICE O/P EST LOW 20 MIN: CPT | Mod: 95

## 2020-04-27 PROCEDURE — 99283 EMERGENCY DEPT VISIT LOW MDM: CPT

## 2020-04-27 PROCEDURE — 87635 SARS-COV-2 COVID-19 AMP PRB: CPT

## 2020-04-27 RX ORDER — FLUTICASONE PROPIONATE 50 UG/1
50 SPRAY, METERED NASAL DAILY
Qty: 1 | Refills: 1 | Status: ACTIVE | COMMUNITY
Start: 2020-04-27 | End: 1900-01-01

## 2020-04-27 NOTE — ED STATDOCS - PATIENT PORTAL LINK FT
You can access the FollowMyHealth Patient Portal offered by [facility] by registering at the following website: [email or web address]. By joining [employer]’s FollowMyHealth portal, you will also be able to view your health information using other applications (apps) compatible with our system.

## 2020-04-27 NOTE — ASSESSMENT
[FreeTextEntry1] : \par Advised  to notify EHS.  Would recommend COVID testing due to high risk workplace.  \par Patient was counseled regarding current COVID - 19 recommendations including self isolation, hand washing and disinfecting of surfaces.  We reviewed  signs and symptoms to warrant emergent evaluation.    All questions answered.  HE will notify office is symptoms not improving.\par Trial Fluticasone NS.  OTC Claritan prn.\par Given # of [facility] Mental HEalth Hotline.  \par

## 2020-04-27 NOTE — ED STATDOCS - PHYSICAL EXAMINATION
Constitutional: NAD AAOx3. Nontoxic, well appearing. Speaking full sentences  w/o distress  Eyes: EOMI, pupils equal  Head: Normocephalic atraumatic  Mouth: no airway obstruction  Cardiac: [id number]   Resp: Lungs CTAB  GI: Abd s/nt/nd  Neuro: motor and sensory intact

## 2020-04-27 NOTE — HISTORY OF PRESENT ILLNESS
[Home] : at home, [unfilled] , at the time of the visit. [Patient] : the patient [FreeTextEntry8] : PT requests visit today due as he has concerns regarding symptoms of possible COVID.  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  He has a mild cough with some nasal congestion and PND.  He does get allergies this time of year.   He denies any fevers or chill,HA or body aches.  HE does have some chest congestion with mild SOB.  NO asthma or lung disease and he does not smoke.  HE is not sure if this is due to anxiety.  [Medical Office: ([facility])___] : at the medical office located in

## 2020-04-27 NOTE — ED STATDOCS - NS ED ROS FT
ROS:   Constitutional- +fever, +chills.    ENT- no rhinorrhea, no sore throat, no congestion.    Cardiac- no chest pain, no palpitations,   Respiratory- +cough, +SOB    Abdomen- No nausea, no vomiting, no diarrhea.    Urinary- no dysuria, no urgency, no frequency.    Skin- No rashes

## 2020-04-27 NOTE — ED STATDOCS - OBJECTIVE STATEMENT
Pt presents to ED with fever, cough,  body aches, x 5 days.   pt works at [employer], pt had appointment with virtual telemedicine doctor who recommended pt be eval.  pt states he is also very anxious.   Pt concerned for possible COVID-19.   Pt here for testing. Pt presents to ED with fever, cough,  body aches, x 5 days.   pt works at [employer], pt had appointment with virtual telemedicine doctor who recommended pt be eval.  pt states he is also very anxious.   Pt concerned for possible COVID-19. pt states his usual HR is elevated to the 100's, pt refusing to be evaluated for his tachycardia.   Pt here for testing.

## 2020-04-27 NOTE — ED ADULT NURSE NOTE - OBJECTIVE STATEMENT
Patient is an employee of [employer]. Patient has given verbal consent for employee health to call them with results.

## 2020-04-27 NOTE — PHYSICAL EXAM
[Well Nourished] : well nourished [No Respiratory Distress] : no respiratory distress  [Well-Appearing] : well-appearing [No Accessory Muscle Use] : no accessory muscle use [Normal Mood] : the mood was normal [Normal Affect] : the affect was normal [Normal Insight/Judgement] : insight and judgment were intact [de-identified] : Respirations unlabored, no distress,  speaks clearly in full sentences.   [de-identified] : A+O x 3

## 2020-04-27 NOTE — ED ADULT TRIAGE NOTE - CHIEF COMPLAINT QUOTE
Discharge instructions given verbally and understood by patient. Self-quarantine and COVID-19 information provided to patient. Unable to sign secondary to COVID-19 PUI. Patient presents for COVID-19 testing; complains of cough fever. Patient evaluated, treated, and discharged by PA. Refer to PA note for assessment.

## 2020-04-28 LAB — SARS-COV-2 RNA SPEC QL NAA+PROBE: DETECTED

## 2020-05-12 DIAGNOSIS — F41.9 ANXIETY DISORDER, UNSPECIFIED: ICD-10-CM

## 2020-05-12 RX ORDER — ALPRAZOLAM 0.25 MG/1
0.25 TABLET ORAL
Qty: 60 | Refills: 0 | Status: ACTIVE | COMMUNITY
Start: 2020-05-12 | End: 1900-01-01

## 2020-05-18 ENCOUNTER — APPOINTMENT (OUTPATIENT)
Dept: INTERNAL MEDICINE | Facility: CLINIC | Age: 55
End: 2020-05-18
Payer: COMMERCIAL

## 2020-05-18 DIAGNOSIS — R05 COUGH: ICD-10-CM

## 2020-05-18 DIAGNOSIS — R53.83 OTHER FATIGUE: ICD-10-CM

## 2020-05-18 DIAGNOSIS — R00.0 TACHYCARDIA, UNSPECIFIED: ICD-10-CM

## 2020-05-18 PROCEDURE — 99214 OFFICE O/P EST MOD 30 MIN: CPT | Mod: 95

## 2020-05-18 NOTE — HISTORY OF PRESENT ILLNESS
[Home] : at home, [unfilled] , at the time of the visit. [Medical Office: ([facility])___] : at the medical office located in  [Patient] : the patient [Self] : self [FreeTextEntry1] : followup [de-identified] : Mr. Mederos presents for a telemedicine followup evaluation. He was diagnosed with the COVID-19 virus on 4/27/20. At that time the patient is having fever, tachycardia and cough. Patient continues to have intermittent episodes of tachycardia and cough associated with significant fatigue. He has had occasional chills but no measured temperature.

## 2020-05-18 NOTE — PLAN
[FreeTextEntry1] :  presents for a followup telemedicine evaluation. He was diagnosed with the corona virus on 4/27/20. Patient continues to have an intermittent cough associated with tachycardia and symptoms of fatigue. He will continue symptomatic treatment. Followup in this office within 2 weeks.

## 2020-05-28 ENCOUNTER — APPOINTMENT (OUTPATIENT)
Dept: INTERNAL MEDICINE | Facility: CLINIC | Age: 55
End: 2020-05-28
Payer: COMMERCIAL

## 2020-05-28 DIAGNOSIS — M54.5 LOW BACK PAIN: ICD-10-CM

## 2020-05-28 DIAGNOSIS — I10 ESSENTIAL (PRIMARY) HYPERTENSION: ICD-10-CM

## 2020-05-28 DIAGNOSIS — U07.1 COVID-19: ICD-10-CM

## 2020-05-28 DIAGNOSIS — E66.3 OVERWEIGHT: ICD-10-CM

## 2020-05-28 PROCEDURE — 99214 OFFICE O/P EST MOD 30 MIN: CPT | Mod: 95

## 2020-06-05 NOTE — PLAN
[FreeTextEntry1] : Mr. Mederos presents for a tele medicine for evaluation previous history for coronavirus infection. He's been asymptomatic for approximately 10 days. Patient has no fevers, cough or shortness of breath. There is no contraindication to returning to work on 6/3/20.

## 2020-06-05 NOTE — HISTORY OF PRESENT ILLNESS
[Home] : at home, [unfilled] , at the time of the visit. [Medical Office: ([facility])___] : at the medical office located in  [Verbal consent obtained from patient] : the patient, [unfilled] [de-identified] : Mr. Mederos presents for a followup tele medicine evaluation. He is feeling well. Patient denies any chest pain, shortness of breath palpitations. He has no nocturnal symptoms of cough or dyspnea. Patient has a history of parvovirus infection but has been asymptomatic for approximately 10 days.

## 2020-06-08 LAB
SARS-COV-2 IGG SERPL IA-ACNC: 16.2 INDEX
SARS-COV-2 IGG SERPL QL IA: POSITIVE

## 2020-06-10 ENCOUNTER — APPOINTMENT (OUTPATIENT)
Dept: INTERNAL MEDICINE | Facility: CLINIC | Age: 55
End: 2020-06-10

## 2020-07-23 NOTE — CONSULT NOTE ADULT - CONSULT REASON
----- Message from Karen Gibbs MD sent at 7/23/2020  5:48 AM EDT -----  Serum potassium is back to normal Back pain, constipation

## 2020-07-24 NOTE — PHYSICAL EXAM
[No Acute Distress] : no acute distress [Well Nourished] : well nourished [Well Developed] : well developed [Normal Sclera/Conjunctiva] : normal sclera/conjunctiva [PERRL] : pupils equal round and reactive to light [Normal Outer Ear/Nose] : the outer ears and nose were normal in appearance [Normal Oropharynx] : the oropharynx was normal [No JVD] : no jugular venous distention [No Lymphadenopathy] : no lymphadenopathy [Supple] : supple [No Respiratory Distress] : no respiratory distress  [No Accessory Muscle Use] : no accessory muscle use [Clear to Auscultation] : lungs were clear to auscultation bilaterally [Normal Rate] : normal rate  [Regular Rhythm] : with a regular rhythm [Normal S1, S2] : normal S1 and S2 [No Edema] : there was no peripheral edema [No Extremity Clubbing/Cyanosis] : no extremity clubbing/cyanosis [Soft] : abdomen soft [Non Tender] : non-tender [Non-distended] : non-distended [No HSM] : no HSM [Normal Bowel Sounds] : normal bowel sounds [No Rash] : no rash [Deep Tendon Reflexes (DTR)] : deep tendon reflexes were 2+ and symmetric [Normal Affect] : the affect was normal [Normal Insight/Judgement] : insight and judgment were intact (3) walks occasionally [de-identified] : Positive lower thor and upper lumbar spine area, positive tender R para lower thor and upper lumbar areas. SLR to 45 degrees bialt. [de-identified] : decreased strength R foot dorsiflexion. decreased touch sensation R lat ankle area

## 2021-02-02 NOTE — DISCHARGE NOTE ADULT - CLICK TO LAUNCH ORM
Detail Level: Simple
Additional Notes: Patient consent was obtained to proceed with the visit and recommended plan of care after discussion of all risks and benefits, including the risks of COVID-19 exposure.  Temperature taken, patient is afebrile
.

## 2021-10-06 PROBLEM — I10 ESSENTIAL HYPERTENSION: Status: ACTIVE | Noted: 2017-04-19

## 2022-07-05 NOTE — PATIENT PROFILE ADULT. - FALL HARM RISK CONCLUSION
[FreeTextEntry1] : The patient was advised of the diagnosis. The natural history of the pathology was explained in full to the patient in layman's terms. All questions were answered. The risks and benefits of surgical and non-surgical treatment alternatives were explained in full to the patient.\par \par Pt was referred to Dr Freire for back pain.\par \par Pt can return to activity as tolerated.\par  Fall with Harm Risk

## 2023-04-05 NOTE — DISCHARGE NOTE ADULT - NS AS DC FU INST LIST INST
amLODIPine 5 mg oral tablet: 1 tab(s) orally once a day  aspirin 81 mg oral delayed release tablet: 1 tab(s) orally once a day  atorvastatin 20 mg oral tablet: 1 tab(s) orally once a day  ciprofloxacin 500 mg oral tablet: 1 tab(s) orally 2 times a day  furosemide 20 mg oral tablet: 1 tab(s) orally 2 times a day  losartan 100 mg oral tablet: 1 tab(s) orally once a day  metoprolol tartrate 50 mg oral tablet: 1 tab(s) orally 2 times a day  metroNIDAZOLE 500 mg oral tablet: 1 tab(s) orally 3 times a day  Xarelto 15 mg oral tablet: 1 tab(s) orally 2 times a day  Xarelto 20 mg oral tablet: 1 tab(s) orally once a day  
no

## 2023-07-03 NOTE — PHYSICAL THERAPY INITIAL EVALUATION ADULT - PHYSICAL ASSIST/NONPHYSICAL ASSIST: STAND/SIT, REHAB EVAL
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 • Depression    • Epistaxis     la 11/29/16   • Impaired fasting glucose    • Mastitis    • Milk intolerance    • Multiple benign polyps of large intestine    • Obesity    • Osteoarthritis of knee    • Osteoporosis    • Psychiatric disorder    • Psychiatric illness    • Psychosis ([address])    • Schizoaffective disorder ([address])    • SOB (shortness of breath) 4/28/2022   • Thickened endometrium    • Vitamin D deficiency      Past Surgical History  Past Surgical History:   Procedure Laterality Date   • IR LUMBAR PUNCTURE  6/23/2023   • MS HYSTEROSCOPY BX ENDOMETRIUM&/POLYPC W/WO D&C N/A 12/28/2017    Procedure: DILATATION AND CURETTAGE (D&C) WITH HYSTEROSCOPY;  Surgeon: Forrest Weaver MD;  [facility];  Service: Gynecology   • WOUND DEBRIDEMENT Left 5/16/2023    Procedure: LEFT KNEE DEBRIDEMENT LOWER EXTREMITY ([address]); Surgeon: Florian Berry DO;  Location: [facility];  Service: Orthopedics   • WRIST GANGLION EXCISION         Summary  Pt is known to [facility].  She had a recent MBS which indicated no aspiration but suspicion for esophageal dysmotility. Per chart review pt is recommended for GI as OP. Primary concern is pt's refusal to consistently take PO. As a result she currently has NGT. She accepted water via straw and a single bite of yancy cracker today. Functional appearing oral and pharyngeal stage swallowing skills again observed. Consider liberalizing pt's diet to encourage intake. No additional ST f/u needed at this time. Risk/s for Aspiration: low    Recommended Diet: regular diet and thin liquids as tolerated, continue NGT if pt refuses to eat. Recommended Form of Meds: as tolerated   Aspiration precautions and swallowing strategies: upright posture, only feed when fully alert, slow rate of feeding, small bites/sips and alternating bites and sips  Other Recommendations: Continue frequent oral care      Current Medical Status per SLIM progress note 7/3/23  * Tuberculosis  Assessment & Plan  Patient was recently admitted with sepsis secondary to septic knee arthritis requiring IV anitbiotics for prolonged period. Patient did have complain of cough and SOB for 1 month prior to that admission  She also spiked fevers despite being on antibiotics and hence ID was on board and an extensive infectious workup was done which was predominantly negative except CT chest showing diffuse nodular interstitial lung disease new from prior study with mediastinal lymphadenopathy worsened from prior study. Acute infectious process suggested. Pulmonology was consulted and BAL was done which showed predominantly lymphocytic fluid but was negative for bacteria and fungus. Eventually today the AFB culture resulted showing positive for AFB - MTC and thus ID contacted public health services who contacted the nursing home and sent the patient to ED for further evaluation and management.    Patient has had multiple positive Quantiferon TB Gold previously which were done during workup prior to initiating rheumatoid arthritis treatment. She also has family history of grandmother with active TB and the whole family was given treatment for latent TB. Patient herself is s/p Isoniazid treatment twice. Plan  • ID following, appreciate recommendations  • Continue RIPE therapy  ? Patient has become increasingly encephalopathic throughout hospitalization. She was deemed to not have medical decision-making capacity per neuropsychology. At this time, she is refusing all p.o. medications. ? Discussing discharge planning with case management, facilities are not agreeable to excepting patient with active TB. ? Now with NG tube in place 6/30; administer RIPE medications vs NG tube. • Monitor for hepatotoxicity; avoid alcohol and other medications affecting liver function  • Disposition planning since patient wont be allowed back into her facility until TB culture are negative   • Monitor respiratory status   • Hold humera and methotrexate  • ID notified public [facility] department  Hypercalcemia  Assessment & Plan  Corrected calcium noted to be mildly elevated 10.2-10.8  Work-up:  • PTH low at 7.7  • Normal vitamin D, phosphorus  Plan:  • Patient refuses IV access so will hold off on IV fluids for now  Patient incapable of making informed decisions  Assessment & Plan  Patient evaluated by neuropsychology on 6/20  • Per neuropsych, patient does not have capacity to make fully informed medical decisions  • Patient continues to refuse all p.o. medications and IV access. She is not agitated or combative but she is not agreeable to receiving treatment at this time. ? Geriatrics consulted; appreciate recommendations  ? See assessment and plan for encephalopathy  Pulmonary cryptococcosis [facility])  Assessment & Plan  BAL cultures showing few colonies of presumptive cryptococcus neoformans. Discussed with infectious disease who recommends further testing with lumbar puncture to rule out meningitis.   Patient currently asymptomatic with no neurologic symptoms. Serum cryptococcus antigen negative. Pre-LP CT head negative for supratentorial masses  Serum cryptococcus antigen negative  Plan:  • Started on amphotericin B per ID  • S/p lumbar puncture 6/23 without evidence of meningitis and negative cryptococcal antigen   • D/c amphotericin / flucytosine   • Started on fluconazole per ID x 6 months   Dysphagia  Assessment & Plan  Recent admission video barium swallow showed "esophageal stasis and slow emptying". Patient was maintained on level 1 dysphagia diet with thin liquids as per speech evaluation and was tolerating well  Was referred to GI outpatient but patient did not have a chance to see them  Plan  • Continue level 1 dysphagia diet with thin liquids for now  • Speech eval  • GI referral for further evaluation as outpatient  • Now with NG tube in place and tube feeds started 7/2  ILD (interstitial lung disease) ([address])  Assessment & Plan  Nodular interstitial lung disease on the CT chest   Likely secondary to methotrexate vs active tuberculosis  Encephalopathy  Assessment & Plan  Patient is alert and oriented x 1 at baseline. Throughout current hospitalization, patient has been refusing medications and lab draws, deemed to not have capacity by neuropsych. Suspect this acute encephalopathy is multifactorial from current hospitalization and medications inducing acute delirium. During last admission, she was seen by psych for psychosis hallucinations, and was started on zyprexa 2.5 mg po QHS. Of note, she was previously on risperidone which was discontinued by PCP due to concern for parkinsonism symptoms. • Plan:  • Geriatrics consult; appreciate recommendations  ?  Continue Zyprexa 2.5 mg po QHS with a linked order for IM zyprexa 2.5 mg QHS if patient is refusing po   Rheumatoid arthritis ([address])  Assessment & Plan  On Humera and methotrexate chronically  Plan  • Hold Humera and methotrexate in view of active TB  • Consider rheum consult if any alternative medications can be prescribed  History of pulmonary embolism  Assessment & Plan  Based on chart review, patient has had a prior history of provoked pulmonary embolism that was diagnosed in October 2022. CTA PE March 2023 that was indicative of no pulmonary embolus at the time. At this point, patient has likely completed 6 months of anticoagulation therapy. 05/29 CTA Chest for PE - no pulmonary embolus  Plan  • Continue w/ lovenox for VTE prophylaxis   • Patient does not require DOAC for VTE treatment  Hyperlipemia  Assessment & Plan  Continue atorvastatin 40 mg OD  Anemia  Assessment & Plan  History of anemia of chronic disease. Plan:  • Hemoglobin stable at 7  • Monitor and transfuse for hemoglobin >7  MDD (major depressive disorder), recurrent, severe, with psychosis [facility])  Assessment & Plan  Patient with history of depression    Special Studies:  MBS 6/6/23 Assessment Summary:    Pt presents with functional oral and pharyngeal stages of swallowing. Assessment deviated from protocol due to patient nausea and gagging. Labial seal and bolus control are adequate. Transfer is timely. Swallow initiation is delayed to the pyriforms with cup sips and to the valleculae with all other trials. Oral residue is observed on the tongue. Laryngeal and hyoid elevation is adequate. Epiglottic inversion is complete. PES opening is adequate. Pharyngeal residue is observed in the valleculae and pyriforms and on the pharyngeal wall. Scan of the esophagus shows stasis and is slow to empty. No penetration or aspiration events observed. Note: Images are available for review in PACS as desired.   Recommendations:   Recommended Diet: puree/level 1 diet and thin liquids   Recommended Form of Medications: crushed with puree   Aspiration precautions and compensatory swallowing strategies: upright posture, slow rate of feeding, small bites/sips and alternating bites and sips  Consider referral to: GI   SLP Dysphagia therapy recommended: yes    Social/Education/Vocational Hx:  Pt lives with family    Swallow Information   Current Risks for Dysphagia & Aspiration: NGT, refusing PO  Current Diet: puree/level 1 diet and thin liquids   Baseline Diet: regular diet and thin liquids      Baseline Assessment   Behavior/Cognition: alert  Speech/Language Status: able to participate in basic conversation and able to follow commands inconsistently  Patient Positioning: upright in bed  Pain Status/Interventions/Response to Interventions: No report of or nonverbal indications of pain. Swallow Mechanism Exam  Facial: symmetrical  Labial: decreased strength  Lingual: decreased ROM, bilateral decreased strength and decreased coordination  Velum: unable to visualize  Mandible:  decreased ROM  Dentition: adequate  Vocal quality:weak   Volitional Cough: weak   Respiratory Status: on RA     Consistencies Assessed and Performance   Consistencies Administered: thin liquids and hard solids    Oral Stage: WFL  Mastication was adequate with the materials administered today. Bolus formation and transfer were functional with no significant oral residue noted. No overt s/s reduced oral control. Pharyngeal Stage: WFL  Swallow Mechanics: Swallowing initiation appeared prompt. Laryngeal rise was palpated and judged to be within functional limits. No coughing, throat clearing, change in vocal quality or respiratory status noted today.      Esophageal Concerns: suspect esophageal dysphagia, pt recommended to see GI as OP    Summary and Recommendations (see above)    Results Reviewed with: RN     Treatment Recommended: No additional ST f/u needed at this time 1 person assist

## 2023-08-12 NOTE — ED ADULT TRIAGE NOTE - HEIGHT IN INCHES
Detail Level: Detailed Include Z78.9 (Other Specified Conditions Influencing Health Status) As An Associated Diagnosis?: No Kenalog Preparation: Kenalog in bacteriostatic water Administered By (Optional): Paxton Pelaez PA-C X Size Of Lesion In Cm (Optional): 0 9 Validate Note Data When Using Inventory: Yes Ndc# For Kenalog Only: 1.5 Medical Necessity Clause: This procedure was medically necessary because the lesions that were treated were: Consent: The risks of atrophy were reviewed with the patient. Total Volume Injected (Ccs- Only Use Numbers And Decimals): 2 Concentration Of Solution Injected (Mg/Ml): 6.5 no loss of consciousness, no gait abnormality, no headache, no sensory deficits, and no weakness.

## 2023-10-05 NOTE — ED ADULT NURSE NOTE - CCCP TRG CHIEF CMPLNT
back pain general Picato Counseling:  I discussed with the patient the risks of Picato including but not limited to erythema, scaling, itching, weeping, crusting, and pain.

## 2023-10-19 NOTE — DIETITIAN INITIAL EVALUATION ADULT. - FEEDING SKILL
Altered Mental Status HPI





- General


Chief Complaint: Altered Mental Status


Stated Complaint: Altered mental


Time Seen by Provider: 10/19/23 06:34


Source: patient, EMS, RN notes reviewed


Mode of arrival: EMS


Limitations: altered mental status





- History of Present Illness


Initial Comments: 


71-year-old male presents emergency from via EMS from home for complaints of 

possible UTI, mental status.  Patient wife reports patient has been more 

confused than usual and she is concerned about possible UTI patient has had in 

the past does complain of mild urinary symptoms and chronic constipation or 

abdominal complaints denies reported fever no chest pain shortness of breath.  

He does admit that he feels really he has a history of Parkinson's disease.








- Related Data


                                Home Medications











 Medication  Instructions  Recorded  Confirmed


 


Carbidopa/Levodopa/Entacapone 1 tab PO TID@0800,1200,1600 10/07/20 10/18/22





[Carbidopa-Levodopa 200 mg-Enta]   


 


polyethylene glycoL 3350 [Miralax] 17 gm PO DAILY PRN 10/07/20 10/18/22


 


Carbidopa-Levodopa  mg 1 tab PO DAILY PRN 03/06/21 10/18/22





[Sinemet  mg]   


 


Sennosides [Senna] 16.2 mg PO DAILY PRN 03/06/21 10/18/22


 


Donepezil HCl [Aricept] 10 mg PO DAILY@1600 10/18/22 10/18/22


 


Tamsulosin HCl [Flomax] 0.4 mg PO DAILY@1600 10/18/22 10/18/22


 


amantadine HCL [Amantadine] 100 mg PO BID@0800,1600 10/18/22 10/18/22











                                    Allergies











Allergy/AdvReac Type Severity Reaction Status Date / Time


 


bee pollen Allergy  Unknown Verified 10/19/23 06:38


 


Penicillins AdvReac  Diarrhea Verified 10/19/23 06:38





   (C-Diff)  














Review of Systems


ROS Statement: 


Those systems with pertinent positive or pertinent negative responses have been 

documented in the HPI.





ROS Other: All systems not noted in ROS Statement are negative.





Past Medical History


Past Medical History: Musculoskeletal Disorder


Additional Past Medical History / Comment(s): Parkinson's


History of Any Multi-Drug Resistant Organisms: C-DIFF


Date of last positivie culture/infection: 01/24/2017


MDRO Source:: "marginal" c-diff, pt states this is "all gone"


Past Surgical History: Orthopedic Surgery


Additional Past Surgical History / Comment(s): right knee sx, back sx, neck 

surgery


Past Anesthesia/Blood Transfusion Reactions: No Reported Reaction


Past Psychological History: No Psychological Hx Reported


Smoking Status: Never smoker


Past Alcohol Use History: Occasional


Past Drug Use History: None Reported





General Exam


Limitations: altered mental status


General appearance: alert, in no apparent distress


Head exam: Present: atraumatic, normocephalic, normal inspection


Eye exam: Present: normal appearance, PERRL, EOMI.  Absent: scleral icterus, 

conjunctival injection, periorbital swelling


ENT exam: Present: normal exam, mucous membranes moist


Neck exam: Present: normal inspection, full ROM.  Absent: tenderness, 

meningismus, lymphadenopathy


Respiratory exam: Present: normal lung sounds bilaterally.  Absent: respiratory 

distress, wheezes, rales, rhonchi, stridor


Cardiovascular Exam: Present: regular rate, normal rhythm, normal heart sounds. 

Absent: systolic murmur, diastolic murmur, rubs, gallop, clicks


GI/Abdominal exam: Present: soft, normal bowel sounds.  Absent: distended, 

tenderness, guarding, rebound, rigid


Neurological exam: Present: alert


Skin exam: Present: warm, dry, intact, normal color.  Absent: rash





Course


                                   Vital Signs











  10/19/23 10/19/23





  06:34 09:48


 


Temperature 99.3 F 


 


Pulse Rate 64 78


 


Respiratory 18 18





Rate  


 


Blood Pressure 143/89 135/74


 


O2 Sat by Pulse 99 99





Oximetry  














Medical Decision Making





- Medical Decision Making


Was pt. sent in by a medical professional or institution (, PA, NP, urgent 

care, hospital, or nursing home...) When possible be specific


@  -no


Did you speak to anyone other than the patient for history (EMS, parent, family,

police, friend...)? What history was obtained from this source 


@  -wife providing significant past medical history


Did you review nursing and triage notes (agree or disagree)?  Why? 


@  -I reviewed and agree with nursing and triage notes


Were old charts reviewed (outside hosp., previous admission, EMS record, old 

EKG, old radiological studies, urgent care reports/EKG's, nursing home records)?

Report findings 


@  -No old charts were reviewed


Differential Diagnosis (chest pain, altered mental status, abdominal pain women,

abdominal pain men, vaginal bleeding, weakness, fever, dyspnea, syncope, 

headache, dizziness, GI bleed, back pain, seizure, CVA, palpatations, mental 

health, musculoskeletal)? 


@  -Kaitlyn Herr weakness


EKG interpreted by me (3pts min.).


@  -As above


X-rays interpreted by me (1pt min.).


@  -Chest x-ray is negative for acute process


CT interpreted by me (1pt min.).


@  -None done


U/S interpreted by me (1pt. min.).


@  -None done


What testing was considered but not performed or refused? (CT, X-rays, U/S, 

labs)? Why?


@  -None


What meds were considered but not given or refused? Why?


@  -None


Did you discuss the management of the patient with other professionals 

(professionals i.e. , PA, NP, lab, RT, psych nurse, , , 

teacher, , )? Give summary


@  -No


Was smoking cessation discussed for >3mins.?


@  -No


Was critical care preformed (if so, how long)?


@  -No


Were there social determinants of health that impacted care today? How? 

(Homelessness, low income, unemployed, alcoholism, drug addiction, 

transportation, low edu. Level, literacy, decrease access to med. care, skilled nursing, 

rehab)?


@  -No


Was there de-escalation of care discussed even if they declined (Discuss DNR or 

withdrawal of care, Hospice)? DNR status


@  -No


What co-morbidities impacted this encounter? (DM, HTN, Smoking, COPD, CAD, 

Cancer, CVA, ARF, Chemo, Hep., AIDS, mental health diagnosis, sleep apnea, 

morbid obesity)?


@  -Dementia, Parkinson's


Was patient admitted / discharged? Hospital course, mention meds given and 

route, prescriptions, significant lab abnormalities, going to OR and other 

pertinent info.


@  -Discharge patient and wife are offered admission for placement as she was 

requesting she states she does not like any rehab facilities or nursing homes 

around here he would prefer to take him home and 5 and [facility] home.  She is 

provided a large amount information return parameters were discussed. 


Undiagnosed new problem with uncertain prognosis?


@  -No


Drug Therapy requiring intensive monitoring for toxicity (Heparin, Nitro, 

Insulin, Cardizem)?


@  -No


Were any procedures done?


@  -No


Diagnosis/symptom?


@  -Dementia, Parkinson's


Acute, or Chronic, or Acute on Chronic?


@  -Chronic


Uncomplicated (without systemic symptoms) or Complicated (systemic symptoms)?


@  -[complicated


Side effects of treatment?


@  -No


Exacerbation, Progression, or Severe Exacerbation?


@  -No


Poses a threat to life or bodily function? How? (Chest pain, USA, MI, pneumonia,

 PE, COPD, DKA, ARF, appy, cholecystitis, CVA, Diverticulitis, Homicidal, 

Suicidal, threat to staff... and all critical care pts)


@  -No








- Lab Data


Result diagrams: 


                                 10/19/23 06:50





                                 10/19/23 06:50


                                   Lab Results











  10/19/23 10/19/23 10/19/23 Range/Units





  06:50 06:50 06:50 


 


WBC  5.7    (3.8-10.6)  k/uL


 


RBC  4.37    (4.30-5.90)  m/uL


 


Hgb  14.1    (13.0-17.5)  gm/dL


 


Hct  41.4    (39.0-53.0)  %


 


MCV  94.8    (80.0-100.0)  fL


 


MCH  32.2    (25.0-35.0)  pg


 


MCHC  34.0    (31.0-37.0)  g/dL


 


RDW  12.8    (11.5-15.5)  %


 


Plt Count  107 L    (150-450)  k/uL


 


MPV  8.2    


 


Neutrophils %  73    %


 


Lymphocytes %  15    %


 


Monocytes %  10    %


 


Eosinophils %  1    %


 


Basophils %  0    %


 


Neutrophils #  4.1    (1.3-7.7)  k/uL


 


Lymphocytes #  0.9 L    (1.0-4.8)  k/uL


 


Monocytes #  0.6    (0-1.0)  k/uL


 


Eosinophils #  0.0    (0-0.7)  k/uL


 


Basophils #  0.0    (0-0.2)  k/uL


 


Sodium    135 L  (137-145)  mmol/L


 


Potassium    4.2  (3.5-5.1)  mmol/L


 


Chloride    106  ()  mmol/L


 


Carbon Dioxide    21 L  (22-30)  mmol/L


 


Anion Gap    8  mmol/L


 


BUN    21 H  (9-20)  mg/dL


 


Creatinine    0.98  (0.66-1.25)  mg/dL


 


Est GFR (CKD-EPI)AfAm    >90  (>60 ml/min/1.73 sqM)  


 


Est GFR (CKD-EPI)NonAf    78  (>60 ml/min/1.73 sqM)  


 


Glucose    85  (74-99)  mg/dL


 


Plasma Lactic Acid [place]     (0.7-2.0)  mmol/L


 


Calcium    8.0 L  (8.4-10.2)  mg/dL


 


Magnesium    1.7  (1.6-2.3)  mg/dL


 


Total Bilirubin    1.3  (0.2-1.3)  mg/dL


 


AST    34  (17-59)  U/L


 


ALT    20  (4-49)  U/L


 


Alkaline Phosphatase    77  ()  U/L


 


Total Protein    6.1 L  (6.3-8.2)  g/dL


 


Albumin    3.5  (3.5-5.0)  g/dL


 


Urine Color   Yellow   


 


Urine Appearance   Clear   (Clear)  


 


Urine pH   6.0   (5.0-8.0)  


 


Ur Specific Gravity   1.017   (1.001-1.035)  


 


Urine Protein   Trace H   (Negative)  


 


Urine Glucose (UA)   Negative   (Negative)  


 


Urine Ketones   Trace H   (Negative)  


 


Urine Blood   Small H   (Negative)  


 


Urine Nitrite   Negative   (Negative)  


 


Urine Bilirubin   Negative   (Negative)  


 


Urine Urobilinogen   <2.0   (<2.0)  mg/dL


 


Ur Leukocyte Esterase   Negative   (Negative)  


 


Urine RBC   5   (0-5)  /hpf


 


Urine WBC   <1   (0-5)  /hpf


 


Urine Mucus   Rare H   (None)  /hpf














  10/19/23 Range/Units





  06:50 


 


WBC   (3.8-10.6)  k/uL


 


RBC   (4.30-5.90)  m/uL


 


Hgb   (13.0-17.5)  gm/dL


 


Hct   (39.0-53.0)  %


 


MCV   (80.0-100.0)  fL


 


MCH   (25.0-35.0)  pg


 


MCHC   (31.0-37.0)  g/dL


 


RDW   (11.5-15.5)  %


 


Plt Count   (150-450)  k/uL


 


MPV   


 


Neutrophils %   %


 


Lymphocytes %   %


 


Monocytes %   %


 


Eosinophils %   %


 


Basophils %   %


 


Neutrophils #   (1.3-7.7)  k/uL


 


Lymphocytes #   (1.0-4.8)  k/uL


 


Monocytes #   (0-1.0)  k/uL


 


Eosinophils #   (0-0.7)  k/uL


 


Basophils #   (0-0.2)  k/uL


 


Sodium   (137-145)  mmol/L


 


Potassium   (3.5-5.1)  mmol/L


 


Chloride   ()  mmol/L


 


Carbon Dioxide   (22-30)  mmol/L


 


Anion Gap   mmol/L


 


BUN   (9-20)  mg/dL


 


Creatinine   (0.66-1.25)  mg/dL


 


Est GFR (CKD-EPI)AfAm   (>60 ml/min/1.73 sqM)  


 


Est GFR (CKD-EPI)NonAf   (>60 ml/min/1.73 sqM)  


 


Glucose   (74-99)  mg/dL


 


Plasma Lactic Acid [place]  1.0  (0.7-2.0)  mmol/L


 


Calcium   (8.4-10.2)  mg/dL


 


Magnesium   (1.6-2.3)  mg/dL


 


Total Bilirubin   (0.2-1.3)  mg/dL


 


AST   (17-59)  U/L


 


ALT   (4-49)  U/L


 


Alkaline Phosphatase   ()  U/L


 


Total Protein   (6.3-8.2)  g/dL


 


Albumin   (3.5-5.0)  g/dL


 


Urine Color   


 


Urine Appearance   (Clear)  


 


Urine pH   (5.0-8.0)  


 


Ur Specific Gravity   (1.001-1.035)  


 


Urine Protein   (Negative)  


 


Urine Glucose (UA)   (Negative)  


 


Urine Ketones   (Negative)  


 


Urine Blood   (Negative)  


 


Urine Nitrite   (Negative)  


 


Urine Bilirubin   (Negative)  


 


Urine Urobilinogen   (<2.0)  mg/dL


 


Ur Leukocyte Esterase   (Negative)  


 


Urine RBC   (0-5)  /hpf


 


Urine WBC   (0-5)  /hpf


 


Urine Mucus   (None)  /hpf














- EKG Data


-: EKG Interpreted by Me


EKG Comments: 





EKG 0758 sinus rhythm rate of 68  QRS 92 QT/QTc 14/418





Disposition


Clinical Impression: 


 Dementia, Parkinson disease





Disposition: HOME SELF-CARE


Condition: Stable


Instructions (If sedation given, give patient instructions):  Parkinson Disease 

(ED), Dementia (ED)


Additional Instructions: 


Please return to the Emergency Department if symptoms worsen or any other 

concerns.


Is patient prescribed a controlled substance at d/c from ED?: No


Referrals: 


Scottie Austin MD [Primary Care Provider] - 1-2 days (Office has been contacted

regarding appointment. Dr Austin is no longer at practice but office is 

attempting to get first available and will contact you. )


Forms:  Adult Foster Care Home List, Community Resources, Help In The Home, 

Personal Care Manager


Time of Disposition: 09:15
independent

## 2025-07-10 NOTE — ED PROVIDER NOTE - MUSCULOSKELETAL, MLM
Caller: DA GREGORY    Relationship to patient: Emergency Contact    Best call back number: [phone]    Patient is needing: PT'S  CALLED AND SAID [facility] [employer] FAXED THE PAPERWORK OVER THIS MORNING AT 11:17AM. SEE PREVIOUS "[employer] MESSAGE. PLEASE ADVISE.      Thank you for choosing [facility]!     Our goal in the Emergency Department is to always provide outstanding medical care. You may receive a survey by mail or e-mail in the next week regarding your experience today. We would greatly appreciate you completing and returning the survey. Your feedback provides us with a way to recognize our staff who provide very good care, and it helps us learn how to improve when your experience was below our aspiration of excellence.      It is important to remember that some problems are difficult to diagnose and may not be found during your first visit. Be sure to follow up with your primary care doctor and review any labs/imaging that was performed during your visit with them. If you do not have a primary care doctor, you may contact the one listed on your discharge paperwork, or you may also call the [facility] Appointment Desk at [phone] to schedule an appointment.     All medications may potentially have side effects and it is impossible to predict which medications may give you side effects. If you feel that you are having a negative effect of any medication you should immediately stop taking them and seek medical attention.  Do not drive or make any important decisions for 24 hours if you have received any pain medications, sedatives or mood altering drugs during your ER visit.    We appreciate you trusting us with your medical care. We will be happy to take care of you for all of your future medical needs. You may return to the ER at any time for any new/concerning symptoms, worsening condition, or failure to improve. We hope you feel better soon.     Sincerely,    Jayme Lyles Jr., MD  Board-Certified Emergency Medicine Physician  [facility]     Severe pain to palpation to bandaged incision at L4 through S1. Severe pain to range of motion in all directions of lumbar spine.